# Patient Record
Sex: FEMALE | NOT HISPANIC OR LATINO | URBAN - METROPOLITAN AREA
[De-identification: names, ages, dates, MRNs, and addresses within clinical notes are randomized per-mention and may not be internally consistent; named-entity substitution may affect disease eponyms.]

---

## 2019-08-08 ENCOUNTER — APPOINTMENT (RX ONLY)
Dept: URBAN - METROPOLITAN AREA CLINIC 57 | Facility: CLINIC | Age: 54
Setting detail: DERMATOLOGY
End: 2019-08-08

## 2019-08-08 ENCOUNTER — RX ONLY (OUTPATIENT)
Age: 54
Setting detail: RX ONLY
End: 2019-08-08

## 2019-08-08 DIAGNOSIS — L82.1 OTHER SEBORRHEIC KERATOSIS: ICD-10-CM

## 2019-08-08 DIAGNOSIS — D22 MELANOCYTIC NEVI: ICD-10-CM

## 2019-08-08 DIAGNOSIS — L60.3 NAIL DYSTROPHY: ICD-10-CM

## 2019-08-08 DIAGNOSIS — L40.0 PSORIASIS VULGARIS: ICD-10-CM

## 2019-08-08 PROBLEM — D22.5 MELANOCYTIC NEVI OF TRUNK: Status: ACTIVE | Noted: 2019-08-08

## 2019-08-08 PROBLEM — L60.9 NAIL DISORDER, UNSPECIFIED: Status: ACTIVE | Noted: 2019-08-08

## 2019-08-08 PROBLEM — L30.9 DERMATITIS, UNSPECIFIED: Status: ACTIVE | Noted: 2019-08-08

## 2019-08-08 PROCEDURE — ? TREATMENT REGIMEN

## 2019-08-08 PROCEDURE — 99203 OFFICE O/P NEW LOW 30 MIN: CPT | Mod: 25

## 2019-08-08 PROCEDURE — ? OBSERVATION AND MEASURE

## 2019-08-08 PROCEDURE — ? PRESCRIPTION

## 2019-08-08 PROCEDURE — ? BIOPSY BY PUNCH METHOD

## 2019-08-08 PROCEDURE — 11104 PUNCH BX SKIN SINGLE LESION: CPT

## 2019-08-08 PROCEDURE — ? COUNSELING

## 2019-08-08 RX ORDER — KETOCONAZOLE 20.5 MG/ML
SHAMPOO, SUSPENSION TOPICAL
Qty: 1 | Refills: 3 | Status: ERX | COMMUNITY
Start: 2019-08-08

## 2019-08-08 RX ORDER — CLOBETASOL PROPIONATE 0.5 MG/G
AEROSOL, FOAM TOPICAL
Qty: 1 | Refills: 3 | Status: ERX | COMMUNITY
Start: 2019-08-08

## 2019-08-08 RX ORDER — KETOCONAZOLE 20.5 MG/ML
SHAMPOO, SUSPENSION TOPICAL
Qty: 1 | Refills: 3 | Status: ERX

## 2019-08-08 RX ORDER — CLOBETASOL PROPIONATE 0.5 MG/G
AEROSOL, FOAM TOPICAL
Qty: 1 | Refills: 3 | Status: ERX

## 2019-08-08 RX ADMIN — CLOBETASOL PROPIONATE: 0.5 AEROSOL, FOAM TOPICAL at 00:00

## 2019-08-08 RX ADMIN — KETOCONAZOLE: 20.5 SHAMPOO, SUSPENSION TOPICAL at 00:00

## 2019-08-08 ASSESSMENT — LOCATION DETAILED DESCRIPTION DERM
LOCATION DETAILED: MID-OCCIPITAL SCALP
LOCATION DETAILED: LEFT MID-UPPER BACK
LOCATION DETAILED: LEFT SUPERIOR MEDIAL UPPER BACK
LOCATION DETAILED: RIGHT INDEX FINGERNAIL
LOCATION DETAILED: LEFT SUPERIOR UPPER BACK
LOCATION DETAILED: LEFT INDEX FINGERNAIL

## 2019-08-08 ASSESSMENT — LOCATION SIMPLE DESCRIPTION DERM
LOCATION SIMPLE: LEFT UPPER BACK
LOCATION SIMPLE: LEFT INDEX FINGERNAIL
LOCATION SIMPLE: POSTERIOR SCALP
LOCATION SIMPLE: RIGHT INDEX FINGERNAIL

## 2019-08-08 ASSESSMENT — LOCATION ZONE DERM
LOCATION ZONE: SCALP
LOCATION ZONE: FINGERNAIL
LOCATION ZONE: TRUNK

## 2019-08-08 NOTE — PROCEDURE: BIOPSY BY PUNCH METHOD
Additional Anesthesia Volume In Cc (Will Not Render If 0): 0
Home Suture Removal Text: Patient was provided a home suture removal kit and will remove their sutures at home.  If they have any questions or difficulties they will call the office.
Number Of Epidermal Sutures (Optional): 2
Anesthesia Type: 1% lidocaine with epinephrine and a 1:10 solution of 8.4% sodium bicarbonate
Lab Facility: [id number]
Bill For Surgical Tray: no
Wound Care: Vaseline
Punch Size In Mm: 4
Detail Level: Detailed
Notification Instructions: Patient will be notified of biopsy results. However, patient instructed to call the office if not contacted within 2 weeks.
Consent: Verbal consent was obtained and risks were reviewed including but not limited to scarring, infection, bleeding, scabbing, incomplete removal, nerve damage and allergy to anesthesia.
Suture Removal: 14 days
Path Notes (To The Dermatopathologist): 4mm punch.
Epidermal Sutures: 4-0 Nylon
Anesthesia Volume In Cc (Will Not Render If 0): 3
Billing Type: Third-Party Bill
Post-Care Instructions: I reviewed with the patient in detail post-care instructions. Patient is to keep the biopsy site dry overnight, and then apply bacitracin twice daily until healed.
Dressing: no dressing applied
Lab: [id number]
Hemostasis: None
Biopsy Type: H and E
Was A Bandage Applied: Yes

## 2019-08-08 NOTE — PROCEDURE: MIPS QUALITY
Quality 110: Preventive Care And Screening: Influenza Immunization: Influenza Immunization not Administered due to Patient Allergy.
Quality 130: Documentation Of Current Medications In The Medical Record: Current Medications Documented
Detail Level: Detailed
Quality 226: Preventive Care And Screening: Tobacco Use: Screening And Cessation Intervention: Patient screened for tobacco use and is an ex/non-smoker
Quality 111:Pneumonia Vaccination Status For Older Adults: Pneumococcal Vaccination not Administered or Previously Received, Reason not Otherwise Specified

## 2019-08-08 NOTE — PROCEDURE: TREATMENT REGIMEN
Action 4: Continue
Start Regimen: Olux 0.05% foam bid prn flares, Ketoconazole 2% shampoo QD prn
Detail Level: Zone

## 2019-08-20 ENCOUNTER — APPOINTMENT (RX ONLY)
Dept: URBAN - METROPOLITAN AREA CLINIC 57 | Facility: CLINIC | Age: 54
Setting detail: DERMATOLOGY
End: 2019-08-20

## 2019-08-20 DIAGNOSIS — L259 CONTACT DERMATITIS AND OTHER ECZEMA, UNSPECIFIED CAUSE: ICD-10-CM

## 2019-08-20 DIAGNOSIS — Z48.02 ENCOUNTER FOR REMOVAL OF SUTURES: ICD-10-CM

## 2019-08-20 PROBLEM — L30.8 OTHER SPECIFIED DERMATITIS: Status: ACTIVE | Noted: 2019-08-20

## 2019-08-20 PROCEDURE — 99213 OFFICE O/P EST LOW 20 MIN: CPT

## 2019-08-20 PROCEDURE — ? SUTURE REMOVAL (GLOBAL PERIOD)

## 2019-08-20 PROCEDURE — ? TREATMENT REGIMEN

## 2019-08-20 PROCEDURE — ? COUNSELING

## 2019-08-20 ASSESSMENT — LOCATION SIMPLE DESCRIPTION DERM
LOCATION SIMPLE: RIGHT EAR
LOCATION SIMPLE: LEFT EAR
LOCATION SIMPLE: POSTERIOR SCALP

## 2019-08-20 ASSESSMENT — LOCATION ZONE DERM
LOCATION ZONE: EAR
LOCATION ZONE: SCALP

## 2019-08-20 ASSESSMENT — LOCATION DETAILED DESCRIPTION DERM
LOCATION DETAILED: LEFT ANTIHELIX
LOCATION DETAILED: RIGHT CYMBA CONCHA
LOCATION DETAILED: MID-OCCIPITAL SCALP

## 2019-08-20 NOTE — PROCEDURE: MIPS QUALITY
Detail Level: Detailed
Quality 110: Preventive Care And Screening: Influenza Immunization: Influenza Immunization not Administered due to Patient Allergy.
Quality 130: Documentation Of Current Medications In The Medical Record: Current Medications Documented
Quality 226: Preventive Care And Screening: Tobacco Use: Screening And Cessation Intervention: Patient screened for tobacco use and is an ex/non-smoker
Quality 111:Pneumonia Vaccination Status For Older Adults: Pneumococcal Vaccination not Administered or Previously Received, Reason not Otherwise Specified

## 2019-08-20 NOTE — PROCEDURE: SUTURE REMOVAL (GLOBAL PERIOD)
Detail Level: Detailed
Add 59290 Cpt? (Important Note: In 2017 The Use Of 19910 Is Being Tracked By Cms To Determine Future Global Period Reimbursement For Global Periods): no

## 2019-08-20 NOTE — PROCEDURE: TREATMENT REGIMEN
Continue Regimen: Olux 0.05% foam bid prn flares, Ketoconazole 2% shampoo QD prn
Detail Level: Zone
Plan: Discussed biopsy results consistent with eczema but would consider rebiopsy in future if rash returns
Otc Regimen: Sarnol lotion QD to ears

## 2019-11-27 ENCOUNTER — APPOINTMENT (RX ONLY)
Dept: URBAN - METROPOLITAN AREA CLINIC 57 | Facility: CLINIC | Age: 54
Setting detail: DERMATOLOGY
End: 2019-11-27

## 2019-11-27 DIAGNOSIS — L20.89 OTHER ATOPIC DERMATITIS: ICD-10-CM | Status: INADEQUATELY CONTROLLED

## 2019-11-27 DIAGNOSIS — L259 CONTACT DERMATITIS AND OTHER ECZEMA, UNSPECIFIED CAUSE: ICD-10-CM | Status: IMPROVED

## 2019-11-27 PROBLEM — L30.8 OTHER SPECIFIED DERMATITIS: Status: ACTIVE | Noted: 2019-11-27

## 2019-11-27 PROBLEM — L30.9 DERMATITIS, UNSPECIFIED: Status: ACTIVE | Noted: 2019-11-27

## 2019-11-27 PROCEDURE — ? COUNSELING

## 2019-11-27 PROCEDURE — 99214 OFFICE O/P EST MOD 30 MIN: CPT

## 2019-11-27 PROCEDURE — ? TREATMENT REGIMEN

## 2019-11-27 PROCEDURE — ? PRESCRIPTION

## 2019-11-27 RX ORDER — HYDROCORTISONE BUTYRATE 1 MG/G
CREAM TOPICAL
Qty: 1 | Refills: 3 | Status: ERX | COMMUNITY
Start: 2019-11-27

## 2019-11-27 RX ORDER — KETOCONAZOLE 20 MG/G
CREAM TOPICAL
Qty: 1 | Refills: 3 | Status: ERX | COMMUNITY
Start: 2019-11-27

## 2019-11-27 RX ADMIN — KETOCONAZOLE: 20 CREAM TOPICAL at 00:00

## 2019-11-27 RX ADMIN — HYDROCORTISONE BUTYRATE: 1 CREAM TOPICAL at 00:00

## 2019-11-27 ASSESSMENT — LOCATION ZONE DERM
LOCATION ZONE: EAR
LOCATION ZONE: AXILLAE
LOCATION ZONE: SCALP

## 2019-11-27 ASSESSMENT — LOCATION DETAILED DESCRIPTION DERM
LOCATION DETAILED: RIGHT CYMBA CONCHA
LOCATION DETAILED: LEFT ANTIHELIX
LOCATION DETAILED: MID-OCCIPITAL SCALP
LOCATION DETAILED: LEFT AXILLARY VAULT

## 2019-11-27 ASSESSMENT — LOCATION SIMPLE DESCRIPTION DERM
LOCATION SIMPLE: RIGHT EAR
LOCATION SIMPLE: LEFT EAR
LOCATION SIMPLE: POSTERIOR SCALP
LOCATION SIMPLE: LEFT AXILLARY VAULT

## 2019-11-27 NOTE — PROCEDURE: MIPS QUALITY
Detail Level: Detailed
Quality 130: Documentation Of Current Medications In The Medical Record: Current Medications Documented
Quality 226: Preventive Care And Screening: Tobacco Use: Screening And Cessation Intervention: Patient screened for tobacco use and is an ex/non-smoker
Quality 110: Preventive Care And Screening: Influenza Immunization: Influenza Immunization not Administered due to Patient Allergy.
Quality 111:Pneumonia Vaccination Status For Older Adults: Pneumococcal Vaccination not Administered or Previously Received, Reason not Otherwise Specified

## 2019-11-27 NOTE — HPI: RASH
What Type Of Note Output Would You Prefer (Optional)?: Standard Output
How Severe Is Your Rash?: moderate
Is This A New Presentation, Or A Follow-Up?: Rash
Additional History: Patient tried OTC Sarna lotion and reports minimal improvement in rash. Patient states deodorant burns upon application.

## 2019-11-27 NOTE — PROCEDURE: TREATMENT REGIMEN
Initiate Treatment: Ketoconazole 2% cream BID to ears
Otc Regimen: Sarnol lotion QD to ears (continue)
Detail Level: Zone
Continue Regimen: Olux 0.05% foam bid prn flares, Ketoconazole 2% shampoo QD prn
Plan: Discussed ILK for treatment to consider in the future if not improving with topicals
Initiate Treatment: HC butyrate 0.1% cream BID PRN flares, ketoconazole 2% cream BID

## 2020-01-03 ENCOUNTER — APPOINTMENT (RX ONLY)
Dept: URBAN - METROPOLITAN AREA CLINIC 57 | Facility: CLINIC | Age: 55
Setting detail: DERMATOLOGY
End: 2020-01-03

## 2020-01-03 DIAGNOSIS — L259 CONTACT DERMATITIS AND OTHER ECZEMA, UNSPECIFIED CAUSE: ICD-10-CM | Status: IMPROVED

## 2020-01-03 DIAGNOSIS — L20.89 OTHER ATOPIC DERMATITIS: ICD-10-CM | Status: IMPROVED

## 2020-01-03 PROBLEM — L30.8 OTHER SPECIFIED DERMATITIS: Status: ACTIVE | Noted: 2020-01-03

## 2020-01-03 PROBLEM — L30.9 DERMATITIS, UNSPECIFIED: Status: ACTIVE | Noted: 2020-01-03

## 2020-01-03 PROCEDURE — ? PRESCRIPTION

## 2020-01-03 PROCEDURE — ? TREATMENT REGIMEN

## 2020-01-03 PROCEDURE — 99213 OFFICE O/P EST LOW 20 MIN: CPT

## 2020-01-03 PROCEDURE — ? COUNSELING

## 2020-01-03 RX ORDER — HYDROCORTISONE ACETATE AND PRAMOXINE HYDROCHLORIDE 25; 10 MG/ML; MG/ML
LOTION TOPICAL
Qty: 1 | Refills: 3 | Status: ERX | COMMUNITY
Start: 2020-01-03

## 2020-01-03 RX ADMIN — HYDROCORTISONE ACETATE AND PRAMOXINE HYDROCHLORIDE: 25; 10 LOTION TOPICAL at 00:00

## 2020-01-03 ASSESSMENT — LOCATION SIMPLE DESCRIPTION DERM
LOCATION SIMPLE: POSTERIOR SCALP
LOCATION SIMPLE: RIGHT EAR
LOCATION SIMPLE: LEFT EAR
LOCATION SIMPLE: LEFT AXILLARY VAULT

## 2020-01-03 ASSESSMENT — LOCATION DETAILED DESCRIPTION DERM
LOCATION DETAILED: LEFT ANTIHELIX
LOCATION DETAILED: RIGHT CYMBA CONCHA
LOCATION DETAILED: MID-OCCIPITAL SCALP
LOCATION DETAILED: LEFT AXILLARY VAULT

## 2020-01-03 ASSESSMENT — LOCATION ZONE DERM
LOCATION ZONE: SCALP
LOCATION ZONE: EAR
LOCATION ZONE: AXILLAE

## 2020-01-03 NOTE — PROCEDURE: TREATMENT REGIMEN
Continue Regimen: ketoconazole 2% cream BID
Samples Given: Pramosone lotion
Otc Regimen: Claritin QHS, continue Allegra QAM, Sarnol lotion QD, lidocaine 4% cream QD PRN
Detail Level: Zone
Initiate Treatment: Pramosone lotion BID PRN flares
Modify Regimen: HC butyrate 0.1% cream BID weekends only
Otc Regimen: Sarnol lotion QD to ears (continue)
Continue Regimen: Olux 0.05% foam bid prn flares, Ketoconazole 2% shampoo QD prn to scalp, Ketoconazole 2% cream BID to ears

## 2020-01-03 NOTE — PROCEDURE: MIPS QUALITY
Detail Level: Detailed
Quality 130: Documentation Of Current Medications In The Medical Record: Current Medications Documented
Quality 111:Pneumonia Vaccination Status For Older Adults: Pneumococcal Vaccination not Administered or Previously Received, Reason not Otherwise Specified
Quality 110: Preventive Care And Screening: Influenza Immunization: Influenza Immunization not Administered due to Patient Allergy.
Quality 226: Preventive Care And Screening: Tobacco Use: Screening And Cessation Intervention: Patient screened for tobacco use and is an ex/non-smoker

## 2020-02-05 ENCOUNTER — APPOINTMENT (RX ONLY)
Dept: URBAN - METROPOLITAN AREA CLINIC 57 | Facility: CLINIC | Age: 55
Setting detail: DERMATOLOGY
End: 2020-02-05

## 2020-02-05 DIAGNOSIS — L259 CONTACT DERMATITIS AND OTHER ECZEMA, UNSPECIFIED CAUSE: ICD-10-CM | Status: IMPROVED

## 2020-02-05 DIAGNOSIS — L20.89 OTHER ATOPIC DERMATITIS: ICD-10-CM | Status: IMPROVED

## 2020-02-05 PROBLEM — L30.8 OTHER SPECIFIED DERMATITIS: Status: ACTIVE | Noted: 2020-02-05

## 2020-02-05 PROBLEM — L30.9 DERMATITIS, UNSPECIFIED: Status: ACTIVE | Noted: 2020-02-05

## 2020-02-05 PROCEDURE — ? PRESCRIPTION

## 2020-02-05 PROCEDURE — ? COUNSELING

## 2020-02-05 PROCEDURE — 99214 OFFICE O/P EST MOD 30 MIN: CPT

## 2020-02-05 PROCEDURE — ? TREATMENT REGIMEN

## 2020-02-05 RX ORDER — PIMECROLIMUS 10 MG/G
CREAM TOPICAL
Qty: 1 | Refills: 3 | Status: ERX | COMMUNITY
Start: 2020-02-05

## 2020-02-05 RX ADMIN — PIMECROLIMUS: 10 CREAM TOPICAL at 00:00

## 2020-02-05 ASSESSMENT — LOCATION DETAILED DESCRIPTION DERM
LOCATION DETAILED: RIGHT CYMBA CONCHA
LOCATION DETAILED: MID-OCCIPITAL SCALP
LOCATION DETAILED: LEFT AXILLARY VAULT
LOCATION DETAILED: LEFT ANTIHELIX

## 2020-02-05 ASSESSMENT — LOCATION SIMPLE DESCRIPTION DERM
LOCATION SIMPLE: RIGHT EAR
LOCATION SIMPLE: LEFT AXILLARY VAULT
LOCATION SIMPLE: LEFT EAR
LOCATION SIMPLE: POSTERIOR SCALP

## 2020-02-05 ASSESSMENT — LOCATION ZONE DERM
LOCATION ZONE: AXILLAE
LOCATION ZONE: SCALP
LOCATION ZONE: EAR

## 2020-02-05 NOTE — PROCEDURE: TREATMENT REGIMEN
Continue Regimen: Ketoconazole 2% cream BID prn\\nPramosone lotion BID PRN flares
Modify Regimen: HC butyrate 0.1% cream BID flares only
Detail Level: Zone
Otc Regimen: Claritin QHS, continue Allegra QAM, Sarnol lotion QD, lidocaine 4% cream QD PRN
Continue Regimen: Olux 0.05% foam bid prn flares, Ketoconazole 2% shampoo QD prn to scalp, Ketoconazole 2% cream BID to ears
Initiate Treatment: Elidel cream BID
Plan: Discussed Elidel cream vs Protopic ointment. Pt would like to proceed with Elidel qd. If burns on application, place in refrigerator and apply cold.
Otc Regimen: Sarna lotion QD to ears (continue)

## 2020-02-05 NOTE — PROCEDURE: MIPS QUALITY
Detail Level: Detailed
Quality 130: Documentation Of Current Medications In The Medical Record: Current Medications Documented
Quality 111:Pneumonia Vaccination Status For Older Adults: Pneumococcal Vaccination not Administered or Previously Received, Reason not Otherwise Specified
Quality 226: Preventive Care And Screening: Tobacco Use: Screening And Cessation Intervention: Patient screened for tobacco use and is an ex/non-smoker
Quality 110: Preventive Care And Screening: Influenza Immunization: Influenza Immunization not Administered due to Patient Allergy.

## 2020-03-25 ENCOUNTER — APPOINTMENT (RX ONLY)
Dept: URBAN - METROPOLITAN AREA CLINIC 57 | Facility: CLINIC | Age: 55
Setting detail: DERMATOLOGY
End: 2020-03-25

## 2020-03-25 DIAGNOSIS — L259 CONTACT DERMATITIS AND OTHER ECZEMA, UNSPECIFIED CAUSE: ICD-10-CM | Status: INADEQUATELY CONTROLLED

## 2020-03-25 DIAGNOSIS — L20.89 OTHER ATOPIC DERMATITIS: ICD-10-CM | Status: INADEQUATELY CONTROLLED

## 2020-03-25 PROBLEM — L30.8 OTHER SPECIFIED DERMATITIS: Status: ACTIVE | Noted: 2020-03-25

## 2020-03-25 PROBLEM — L30.9 DERMATITIS, UNSPECIFIED: Status: ACTIVE | Noted: 2020-03-25

## 2020-03-25 PROCEDURE — 99214 OFFICE O/P EST MOD 30 MIN: CPT

## 2020-03-25 PROCEDURE — ? TREATMENT REGIMEN

## 2020-03-25 PROCEDURE — ? COUNSELING

## 2020-03-25 ASSESSMENT — LOCATION ZONE DERM
LOCATION ZONE: EAR
LOCATION ZONE: SCALP
LOCATION ZONE: AXILLAE

## 2020-03-25 ASSESSMENT — LOCATION SIMPLE DESCRIPTION DERM
LOCATION SIMPLE: LEFT EAR
LOCATION SIMPLE: RIGHT EAR
LOCATION SIMPLE: LEFT AXILLARY VAULT
LOCATION SIMPLE: POSTERIOR SCALP

## 2020-03-25 ASSESSMENT — LOCATION DETAILED DESCRIPTION DERM
LOCATION DETAILED: LEFT AXILLARY VAULT
LOCATION DETAILED: RIGHT CYMBA CONCHA
LOCATION DETAILED: LEFT ANTIHELIX
LOCATION DETAILED: MID-OCCIPITAL SCALP

## 2020-03-25 ASSESSMENT — SEVERITY ASSESSMENT 2020: SEVERITY 2020: MODERATE

## 2020-03-25 NOTE — PROCEDURE: MIPS QUALITY
Quality 226: Preventive Care And Screening: Tobacco Use: Screening And Cessation Intervention: Patient screened for tobacco use and is an ex/non-smoker
Detail Level: Detailed
Quality 130: Documentation Of Current Medications In The Medical Record: Current Medications Documented
Quality 110: Preventive Care And Screening: Influenza Immunization: Influenza Immunization not Administered due to Patient Allergy.
Quality 111:Pneumonia Vaccination Status For Older Adults: Pneumococcal Vaccination not Administered or Previously Received, Reason not Otherwise Specified

## 2020-03-25 NOTE — PROCEDURE: TREATMENT REGIMEN
Detail Level: Zone
Otc Regimen: Claritin QHS\\nAllegra QAM\\nSarnol lotion QD
Plan: Briefly discussed biologics to consider for the future once Covid19 has improved
Initiate Treatment: Elidel BID axillae\\nDomeboros solution PRN flares when ears are weeping
Continue Regimen: Ketoconazole 2% cream BID + HC butyrate cream until improved, then for flares only\\nPramosone lotion BID PRN flares
Initiate Treatment: Olux foam BID AAA scalp, ears x 1-2 weeks until better, then BID on weekends for maintenance
Otc Regimen: Sarna lotion QD to ears (continue)
Continue Regimen: Olux 0.05% foam BID PRN flares\\nKetoconazole 2% shampoo QD \\nKetoconazole 2% cream BID

## 2020-05-27 ENCOUNTER — APPOINTMENT (RX ONLY)
Dept: URBAN - METROPOLITAN AREA CLINIC 57 | Facility: CLINIC | Age: 55
Setting detail: DERMATOLOGY
End: 2020-05-27

## 2020-05-27 DIAGNOSIS — L40.0 PSORIASIS VULGARIS: ICD-10-CM | Status: INADEQUATELY CONTROLLED

## 2020-05-27 DIAGNOSIS — L20.89 OTHER ATOPIC DERMATITIS: ICD-10-CM | Status: IMPROVED

## 2020-05-27 PROBLEM — L30.9 DERMATITIS, UNSPECIFIED: Status: ACTIVE | Noted: 2020-05-27

## 2020-05-27 PROCEDURE — ? TREATMENT REGIMEN

## 2020-05-27 PROCEDURE — 11104 PUNCH BX SKIN SINGLE LESION: CPT

## 2020-05-27 PROCEDURE — ? SEPARATE AND IDENTIFIABLE DOCUMENTATION

## 2020-05-27 PROCEDURE — ? COUNSELING

## 2020-05-27 PROCEDURE — ? BIOPSY BY PUNCH METHOD

## 2020-05-27 PROCEDURE — 99213 OFFICE O/P EST LOW 20 MIN: CPT | Mod: 25

## 2020-05-27 ASSESSMENT — LOCATION DETAILED DESCRIPTION DERM
LOCATION DETAILED: LEFT CYMBA CONCHA
LOCATION DETAILED: LEFT MEDIAL FRONTAL SCALP
LOCATION DETAILED: LEFT AXILLARY VAULT
LOCATION DETAILED: MID POSTERIOR NECK
LOCATION DETAILED: RIGHT CYMBA CONCHA

## 2020-05-27 ASSESSMENT — LOCATION SIMPLE DESCRIPTION DERM
LOCATION SIMPLE: RIGHT EAR
LOCATION SIMPLE: POSTERIOR NECK
LOCATION SIMPLE: LEFT AXILLARY VAULT
LOCATION SIMPLE: LEFT SCALP
LOCATION SIMPLE: LEFT EAR

## 2020-05-27 ASSESSMENT — LOCATION ZONE DERM
LOCATION ZONE: SCALP
LOCATION ZONE: NECK
LOCATION ZONE: EAR
LOCATION ZONE: AXILLAE

## 2020-05-27 NOTE — PROCEDURE: TREATMENT REGIMEN
Detail Level: Zone
Continue Regimen: Elidel 1% cream bid, Ketoconazole 2% cream bid, Hydrocortisone butyrate 0.1% cream bid prn flares
Otc Regimen: Allegra QAM, Claritin QHS
Plan: Discussed second biopsy by punch to evaluate for psoriasis; patient agreeable. May consider biologics at NOV if pathology reveals psoriasis
Continue Regimen: Olux 0.05% foam BID PRN flares to scalp/ears, Ketoconazole 2% shampoo BIW-TIW, Ketoconazole 2% cream BID to ears

## 2020-05-27 NOTE — PROCEDURE: BIOPSY BY PUNCH METHOD
Detail Level: Detailed
Was A Bandage Applied: Yes
Punch Size In Mm: 4
Biopsy Type: H and E
Anesthesia Type: 1% lidocaine with epinephrine and a 1:10 solution of 8.4% sodium bicarbonate
Anesthesia Volume In Cc (Will Not Render If 0): 1
Additional Anesthesia Volume In Cc (Will Not Render If 0): 0
Hemostasis: None
Epidermal Sutures: 4-0 Nylon
Number Of Epidermal Sutures (Optional): 2
Wound Care: Vaseline
Dressing: no dressing applied
Suture Removal: 14 days
Patient Will Remove Sutures At Home?: No
Lab: [id number]
Lab Facility: [id number]
Path Notes (To The Dermatopathologist): 4mm punch
Consent: Verbal consent was obtained and risks were reviewed including but not limited to scarring, infection, bleeding, scabbing, incomplete removal, nerve damage and allergy to anesthesia.
Post-Care Instructions: I reviewed with the patient in detail post-care instructions. Patient is to keep the biopsy site dry overnight, and then apply bacitracin twice daily until healed.
Home Suture Removal Text: Patient was provided a home suture removal kit and will remove their sutures at home.  If they have any questions or difficulties they will call the office.
Notification Instructions: Patient will be notified of biopsy results. However, patient instructed to call the office if not contacted within 2 weeks.
Billing Type: Third-Party Bill
Information: Selecting Yes will display possible errors in your note based on the variables you have selected. This validation is only offered as a suggestion for you. PLEASE NOTE THAT THE VALIDATION TEXT WILL BE REMOVED WHEN YOU FINALIZE YOUR NOTE. IF YOU WANT TO FAX A PRELIMINARY NOTE YOU WILL NEED TO TOGGLE THIS TO 'NO' IF YOU DO NOT WANT IT IN YOUR FAXED NOTE.

## 2020-06-08 ENCOUNTER — APPOINTMENT (RX ONLY)
Dept: URBAN - METROPOLITAN AREA CLINIC 57 | Facility: CLINIC | Age: 55
Setting detail: DERMATOLOGY
End: 2020-06-08

## 2020-06-08 DIAGNOSIS — L259 CONTACT DERMATITIS AND OTHER ECZEMA, UNSPECIFIED CAUSE: ICD-10-CM | Status: STABLE

## 2020-06-08 DIAGNOSIS — L738 OTHER SPECIFIED DISEASES OF HAIR AND HAIR FOLLICLES: ICD-10-CM | Status: IMPROVED

## 2020-06-08 DIAGNOSIS — L73.9 FOLLICULAR DISORDER, UNSPECIFIED: ICD-10-CM | Status: IMPROVED

## 2020-06-08 DIAGNOSIS — L663 OTHER SPECIFIED DISEASES OF HAIR AND HAIR FOLLICLES: ICD-10-CM | Status: IMPROVED

## 2020-06-08 DIAGNOSIS — Z71.89 OTHER SPECIFIED COUNSELING: ICD-10-CM

## 2020-06-08 DIAGNOSIS — L20.89 OTHER ATOPIC DERMATITIS: ICD-10-CM | Status: STABLE

## 2020-06-08 DIAGNOSIS — Z48.02 ENCOUNTER FOR REMOVAL OF SUTURES: ICD-10-CM

## 2020-06-08 PROBLEM — L30.8 OTHER SPECIFIED DERMATITIS: Status: ACTIVE | Noted: 2020-06-08

## 2020-06-08 PROBLEM — L30.9 DERMATITIS, UNSPECIFIED: Status: ACTIVE | Noted: 2020-06-08

## 2020-06-08 PROBLEM — L02.821 FURUNCLE OF HEAD [ANY PART, EXCEPT FACE]: Status: ACTIVE | Noted: 2020-06-08

## 2020-06-08 PROCEDURE — ? COUNSELING

## 2020-06-08 PROCEDURE — ? TREATMENT REGIMEN

## 2020-06-08 PROCEDURE — ? PATHOLOGY DISCUSSION

## 2020-06-08 PROCEDURE — ? SUTURE REMOVAL (GLOBAL PERIOD)

## 2020-06-08 PROCEDURE — 99213 OFFICE O/P EST LOW 20 MIN: CPT

## 2020-06-08 ASSESSMENT — LOCATION ZONE DERM
LOCATION ZONE: TRUNK
LOCATION ZONE: NECK
LOCATION ZONE: EAR
LOCATION ZONE: SCALP
LOCATION ZONE: AXILLAE

## 2020-06-08 ASSESSMENT — LOCATION DETAILED DESCRIPTION DERM
LOCATION DETAILED: INFERIOR THORACIC SPINE
LOCATION DETAILED: MID-OCCIPITAL SCALP
LOCATION DETAILED: RIGHT CYMBA CONCHA
LOCATION DETAILED: MID POSTERIOR NECK
LOCATION DETAILED: LEFT AXILLARY VAULT
LOCATION DETAILED: LEFT ANTIHELIX

## 2020-06-08 ASSESSMENT — LOCATION SIMPLE DESCRIPTION DERM
LOCATION SIMPLE: RIGHT EAR
LOCATION SIMPLE: UPPER BACK
LOCATION SIMPLE: LEFT AXILLARY VAULT
LOCATION SIMPLE: LEFT EAR
LOCATION SIMPLE: POSTERIOR NECK
LOCATION SIMPLE: POSTERIOR SCALP

## 2020-06-08 NOTE — PROCEDURE: SUTURE REMOVAL (GLOBAL PERIOD)
Detail Level: Detailed
Add 91282 Cpt? (Important Note: In 2017 The Use Of 84514 Is Being Tracked By Cms To Determine Future Global Period Reimbursement For Global Periods): no

## 2020-06-08 NOTE — PROCEDURE: TREATMENT REGIMEN
Continue Regimen: Elidel 1% cream bid, Ketoconazole 2% cream bid, Hydrocortisone butyrate 0.1% cream bid prn flares
Detail Level: Zone
Otc Regimen: Allegra QAM (continue), Claritin QHS (continue) OR Xyzal 5mg QHS
Continue Regimen: Olux 0.05% foam BID PRN flares to scalp/ears, Ketoconazole 2% shampoo BIW-TIW, Ketoconazole 2% cream BID to ears
Plan: Even though biopsy shows folliculitis, it does not match clinically with rash on scalp; may have been sampling error. Discussed rebiopsy in the future if rash recurs; pt is agreeable
Otc Regimen: CeraVe or Cetaphil body wash QD, Eucerin Smooth Repair cream QD after shower

## 2020-08-04 ENCOUNTER — APPOINTMENT (RX ONLY)
Dept: URBAN - METROPOLITAN AREA CLINIC 125 | Facility: CLINIC | Age: 55
Setting detail: DERMATOLOGY
End: 2020-08-04

## 2020-08-04 DIAGNOSIS — L663 OTHER SPECIFIED DISEASES OF HAIR AND HAIR FOLLICLES: ICD-10-CM

## 2020-08-04 DIAGNOSIS — L20.89 OTHER ATOPIC DERMATITIS: ICD-10-CM | Status: INADEQUATELY CONTROLLED

## 2020-08-04 DIAGNOSIS — Z79.899 OTHER LONG TERM (CURRENT) DRUG THERAPY: ICD-10-CM

## 2020-08-04 DIAGNOSIS — B00.1 HERPESVIRAL VESICULAR DERMATITIS: ICD-10-CM

## 2020-08-04 DIAGNOSIS — L259 CONTACT DERMATITIS AND OTHER ECZEMA, UNSPECIFIED CAUSE: ICD-10-CM | Status: INADEQUATELY CONTROLLED

## 2020-08-04 DIAGNOSIS — L738 OTHER SPECIFIED DISEASES OF HAIR AND HAIR FOLLICLES: ICD-10-CM

## 2020-08-04 DIAGNOSIS — L73.9 FOLLICULAR DISORDER, UNSPECIFIED: ICD-10-CM

## 2020-08-04 PROBLEM — L30.9 DERMATITIS, UNSPECIFIED: Status: ACTIVE | Noted: 2020-08-04

## 2020-08-04 PROBLEM — L02.821 FURUNCLE OF HEAD [ANY PART, EXCEPT FACE]: Status: ACTIVE | Noted: 2020-08-04

## 2020-08-04 PROCEDURE — ? TREATMENT REGIMEN

## 2020-08-04 PROCEDURE — ? ORDER TESTS

## 2020-08-04 PROCEDURE — ? COUNSELING

## 2020-08-04 PROCEDURE — 99214 OFFICE O/P EST MOD 30 MIN: CPT

## 2020-08-04 PROCEDURE — ? HIGH RISK MEDICATION MONITORING

## 2020-08-04 PROCEDURE — ? PRESCRIPTION

## 2020-08-04 PROCEDURE — ? OTHER

## 2020-08-04 RX ORDER — VALACYCLOVIR HYDROCHLORIDE 1 G/1
TABLET, FILM COATED ORAL
Qty: 20 | Refills: 2 | Status: ERX | COMMUNITY
Start: 2020-08-04

## 2020-08-04 RX ADMIN — VALACYCLOVIR HYDROCHLORIDE: 1 TABLET, FILM COATED ORAL at 00:00

## 2020-08-04 ASSESSMENT — LOCATION SIMPLE DESCRIPTION DERM
LOCATION SIMPLE: LEFT SCALP
LOCATION SIMPLE: RIGHT EAR
LOCATION SIMPLE: LEFT LIP
LOCATION SIMPLE: LEFT AXILLARY VAULT
LOCATION SIMPLE: POSTERIOR SCALP
LOCATION SIMPLE: LEFT EAR

## 2020-08-04 ASSESSMENT — LOCATION DETAILED DESCRIPTION DERM
LOCATION DETAILED: LEFT INFERIOR VERMILION LIP
LOCATION DETAILED: RIGHT CYMBA CONCHA
LOCATION DETAILED: LEFT MEDIAL FRONTAL SCALP
LOCATION DETAILED: LEFT INFERIOR CRUS OF ANTIHELIX
LOCATION DETAILED: LEFT AXILLARY VAULT
LOCATION DETAILED: MID-OCCIPITAL SCALP
LOCATION DETAILED: LEFT ANTIHELIX

## 2020-08-04 ASSESSMENT — LOCATION ZONE DERM
LOCATION ZONE: SCALP
LOCATION ZONE: LIP
LOCATION ZONE: AXILLAE
LOCATION ZONE: EAR

## 2020-08-04 ASSESSMENT — BSA ECZEMA: % BODY COVERED IN ECZEMA: 3

## 2020-08-04 ASSESSMENT — SEVERITY ASSESSMENT 2020: SEVERITY 2020: SEVERE

## 2020-08-04 NOTE — PROCEDURE: OTHER
Other (Free Text): Lab slip given today.
Detail Level: Simple
Note Text (......Xxx Chief Complaint.): This diagnosis correlates with the

## 2020-08-04 NOTE — PROCEDURE: ORDER TESTS
Bill For Surgical Tray: no
Billing Type: Third-Party Bill
Expected Date Of Service: 08/04/2020
Performing Laboratory: [id number]

## 2020-08-04 NOTE — PROCEDURE: HIGH RISK MEDICATION MONITORING
Itraconazole Counseling:  I discussed with the patient the risks of itraconazole including but not limited to liver damage, nausea/vomiting, neuropathy, and severe allergy.  The patient understands that this medication is best absorbed when taken with acidic beverages such as non-diet cola or ginger ale.  The patient understands that monitoring is required including baseline LFTs and repeat LFTs at intervals.  The patient understands that they are to contact us or the primary physician if concerning signs are noted.
Azithromycin Counseling:  I discussed with the patient the risks of azithromycin including but not limited to GI upset, allergic reaction, drug rash, diarrhea, and yeast infections.
Ivermectin Pregnancy And Lactation Text: This medication is Pregnancy Category C and it isn't known if it is safe during pregnancy. It is also excreted in breast milk.
Infliximab Pregnancy And Lactation Text: This medication is Pregnancy Category B and is considered safe during pregnancy. It is unknown if this medication is excreted in breast milk.
Xolair Counseling:  Patient informed of potential adverse effects including but not limited to fever, muscle aches, rash and allergic reactions.  The patient verbalized understanding of the proper use and possible adverse effects of Xolair.  All of the patient's questions and concerns were addressed.
Bexarotene Counseling:  I discussed with the patient the risks of bexarotene including but not limited to hair loss, dry lips/skin/eyes, liver abnormalities, hyperlipidemia, pancreatitis, depression/suicidal ideation, photosensitivity, drug rash/allergic reactions, hypothyroidism, anemia, leukopenia, infection, cataracts, and teratogenicity.  Patient understands that they will need regular blood tests to check lipid profile, liver function tests, white blood cell count, thyroid function tests and pregnancy test if applicable.
Hydroxychloroquine Pregnancy And Lactation Text: This medication has been shown to cause fetal harm but it isn't assigned a Pregnancy Risk Category. There are small amounts excreted in breast milk.
Xolair Pregnancy And Lactation Text: This medication is Pregnancy Category B and is considered safe during pregnancy. This medication is excreted in breast milk.
Nsaids Counseling: NSAID Counseling: I discussed with the patient that NSAIDs should be taken with food. Prolonged use of NSAIDs can result in the development of stomach ulcers.  Patient advised to stop taking NSAIDs if abdominal pain occurs.  The patient verbalized understanding of the proper use and possible adverse effects of NSAIDs.  All of the patient's questions and concerns were addressed.
Valtrex Pregnancy And Lactation Text: this medication is Pregnancy Category B and is considered safe during pregnancy. This medication is not directly found in breast milk but it's metabolite acyclovir is present.
Eucrisa Pregnancy And Lactation Text: This medication has not been assigned a Pregnancy Risk Category but animal studies failed to show danger with the topical medication. It is unknown if the medication is excreted in breast milk.
Quinolones Pregnancy And Lactation Text: This medication is Pregnancy Category C and it isn't know if it is safe during pregnancy. It is also excreted in breast milk.
Rituxan Counseling:  I discussed with the patient the risks of Rituxan infusions. Side effects can include infusion reactions, severe drug rashes including mucocutaneous reactions, reactivation of latent hepatitis and other infections and rarely progressive multifocal leukoencephalopathy.  All of the patient's questions and concerns were addressed.
Topical Clindamycin Pregnancy And Lactation Text: This medication is Pregnancy Category B and is considered safe during pregnancy. It is unknown if it is excreted in breast milk.
Bexarotene Pregnancy And Lactation Text: This medication is Pregnancy Category X and should not be given to women who are pregnant or may become pregnant. This medication should not be used if you are breast feeding.
Nsaids Pregnancy And Lactation Text: These medications are considered safe up to 30 weeks gestation. It is excreted in breast milk.
Topical Sulfur Applications Counseling: Topical Sulfur Counseling: Patient counseled that this medication may cause skin irritation or allergic reactions.  In the event of skin irritation, the patient was advised to reduce the amount of the drug applied or use it less frequently.   The patient verbalized understanding of the proper use and possible adverse effects of topical sulfur application.  All of the patient's questions and concerns were addressed.
Rifampin Counseling: I discussed with the patient the risks of rifampin including but not limited to liver damage, kidney damage, red-orange body fluids, nausea/vomiting and severe allergy.
Azithromycin Pregnancy And Lactation Text: This medication is considered safe during pregnancy and is also secreted in breast milk.
Hydroquinone Counseling:  Patient advised that medication may result in skin irritation, lightening (hypopigmentation), dryness, and burning.  In the event of skin irritation, the patient was advised to reduce the amount of the drug applied or use it less frequently.  Rarely, spots that are treated with hydroquinone can become darker (pseudoochronosis).  Should this occur, patient instructed to stop medication and call the office. The patient verbalized understanding of the proper use and possible adverse effects of hydroquinone.  All of the patient's questions and concerns were addressed.
Bactrim Counseling:  I discussed with the patient the risks of sulfa antibiotics including but not limited to GI upset, allergic reaction, drug rash, diarrhea, dizziness, photosensitivity, and yeast infections.  Rarely, more serious reactions can occur including but not limited to aplastic anemia, agranulocytosis, methemoglobinemia, blood dyscrasias, liver or kidney failure, lung infiltrates or desquamative/blistering drug rashes.
Ketoconazole Counseling:   Patient counseled regarding improving absorption with orange juice.  Adverse effects include but are not limited to breast enlargement, headache, diarrhea, nausea, upset stomach, liver function test abnormalities, taste disturbance, and stomach pain.  There is a rare possibility of liver failure that can occur when taking ketoconazole. The patient understands that monitoring of LFTs may be required, especially at baseline. The patient verbalized understanding of the proper use and possible adverse effects of ketoconazole.  All of the patient's questions and concerns were addressed.
Isotretinoin Counseling: Patient should get monthly blood tests, not donate blood, not drive at night if vision affected, not share medication, and not undergo elective surgery for 6 months after tx completed. Side effects reviewed, pt to contact office should one occur.
Use Enhanced Medication Counseling?: No
Odomzo Counseling- I discussed with the patient the risks of Odomzo including but not limited to nausea, vomiting, diarrhea, constipation, weight loss, changes in the sense of taste, decreased appetite, muscle spasms, and hair loss.  The patient verbalized understanding of the proper use and possible adverse effects of Odomzo.  All of the patient's questions and concerns were addressed.
Topical Sulfur Applications Pregnancy And Lactation Text: This medication is Pregnancy Category C and has an unknown safety profile during pregnancy. It is unknown if this topical medication is excreted in breast milk.
Rituxan Pregnancy And Lactation Text: This medication is Pregnancy Category C and it isn't know if it is safe during pregnancy. It is unknown if this medication is excreted in breast milk but similar antibodies are known to be excreted.
Azathioprine Counseling:  I discussed with the patient the risks of azathioprine including but not limited to myelosuppression, immunosuppression, hepatotoxicity, lymphoma, and infections.  The patient understands that monitoring is required including baseline LFTs, Creatinine, possible TPMP genotyping and weekly CBCs for the first month and then every 2 weeks thereafter.  The patient verbalized understanding of the proper use and possible adverse effects of azathioprine.  All of the patient's questions and concerns were addressed.
Azathioprine Pregnancy And Lactation Text: This medication is Pregnancy Category D and isn't considered safe during pregnancy. It is unknown if this medication is excreted in breast milk.
Ketoconazole Pregnancy And Lactation Text: This medication is Pregnancy Category C and it isn't know if it is safe during pregnancy. It is also excreted in breast milk and breast feeding isn't recommended.
Siliq Counseling:  I discussed with the patient the risks of Siliq including but not limited to new or worsening depression, suicidal thoughts and behavior, immunosuppression, malignancy, posterior leukoencephalopathy syndrome, and serious infections.  The patient understands that monitoring is required including a PPD at baseline and must alert us or the primary physician if symptoms of infection or other concerning signs are noted. There is also a special program designed to monitor depression which is required with Siliq.
Bactrim Pregnancy And Lactation Text: This medication is Pregnancy Category D and is known to cause fetal risk.  It is also excreted in breast milk.
Isotretinoin Pregnancy And Lactation Text: This medication is Pregnancy Category X and is considered extremely dangerous during pregnancy. It is unknown if it is excreted in breast milk.
High Dose Vitamin A Counseling: Side effects reviewed, pt to contact office should one occur.
Odomzo Pregnancy And Lactation Text: This medication is Pregnancy Category X and is absolutely contraindicated during pregnancy. It is unknown if it is excreted in breast milk.
Zyclara Counseling:  I discussed with the patient the risks of imiquimod including but not limited to erythema, scaling, itching, weeping, crusting, and pain.  Patient understands that the inflammatory response to imiquimod is variable from person to person and was educated regarded proper titration schedule.  If flu-like symptoms develop, patient knows to discontinue the medication and contact us.
Imiquimod Counseling:  I discussed with the patient the risks of imiquimod including but not limited to erythema, scaling, itching, weeping, crusting, and pain.  Patient understands that the inflammatory response to imiquimod is variable from person to person and was educated regarded proper titration schedule.  If flu-like symptoms develop, patient knows to discontinue the medication and contact us.
Imiquimod Pregnancy And Lactation Text: This medication is Pregnancy Category C. It is unknown if this medication is excreted in breast milk.
Cephalexin Counseling: I counseled the patient regarding use of cephalexin as an antibiotic for prophylactic and/or therapeutic purposes. Cephalexin (commonly prescribed under brand name Keflex) is a cephalosporin antibiotic which is active against numerous classes of bacteria, including most skin bacteria. Side effects may include nausea, diarrhea, gastrointestinal upset, rash, hives, yeast infections, and in rare cases, hepatitis, kidney disease, seizures, fever, confusion, neurologic symptoms, and others. Patients with severe allergies to penicillin medications are cautioned that there is about a 10% incidence of cross-reactivity with cephalosporins. When possible, patients with penicillin allergies should use alternatives to cephalosporins for antibiotic therapy.
Siliq Pregnancy And Lactation Text: The risk during pregnancy and breastfeeding is uncertain with this medication.
Terbinafine Counseling: Patient counseling regarding adverse effects of terbinafine including but not limited to headache, diarrhea, rash, upset stomach, liver function test abnormalities, itching, taste/smell disturbance, nausea, abdominal pain, and flatulence.  There is a rare possibility of liver failure that can occur when taking terbinafine.  The patient understands that a baseline LFT and kidney function test may be required. The patient verbalized understanding of the proper use and possible adverse effects of terbinafine.  All of the patient's questions and concerns were addressed.
Cellcept Counseling:  I discussed with the patient the risks of mycophenolate mofetil including but not limited to infection/immunosuppression, GI upset, hypokalemia, hypercholesterolemia, bone marrow suppression, lymphoproliferative disorders, malignancy, GI ulceration/bleed/perforation, colitis, interstitial lung disease, kidney failure, progressive multifocal leukoencephalopathy, and birth defects.  The patient understands that monitoring is required including a baseline creatinine and regular CBC testing. In addition, patient must alert us immediately if symptoms of infection or other concerning signs are noted.
High Dose Vitamin A Pregnancy And Lactation Text: High dose vitamin A therapy is contraindicated during pregnancy and breast feeding.
Cimzia Counseling:  I discussed with the patient the risks of Cimzia including but not limited to immunosuppression, allergic reactions and infections.  The patient understands that monitoring is required including a PPD at baseline and must alert us or the primary physician if symptoms of infection or other concerning signs are noted.
Otezla Counseling: The side effects of Otezla were discussed with the patient, including but not limited to worsening or new depression, weight loss, diarrhea, nausea, upper respiratory tract infection, and headache. Patient instructed to call the office should any adverse effect occur.  The patient verbalized understanding of the proper use and possible adverse effects of Otezla.  All the patient's questions and concerns were addressed.
Terbinafine Pregnancy And Lactation Text: This medication is Pregnancy Category B and is considered safe during pregnancy. It is also excreted in breast milk and breast feeding isn't recommended.
Otezla Pregnancy And Lactation Text: This medication is Pregnancy Category C and it isn't known if it is safe during pregnancy. It is unknown if it is excreted in breast milk.
Minoxidil Counseling: Minoxidil is a topical medication which can increase blood flow where it is applied. It is uncertain how this medication increases hair growth. Side effects are uncommon and include stinging and allergic reactions.
Detail Level: Generalized
Simponi Counseling:  I discussed with the patient the risks of golimumab including but not limited to myelosuppression, immunosuppression, autoimmune hepatitis, demyelinating diseases, lymphoma, and serious infections.  The patient understands that monitoring is required including a PPD at baseline and must alert us or the primary physician if symptoms of infection or other concerning signs are noted.
Cephalexin Pregnancy And Lactation Text: This medication is Pregnancy Category B and considered safe during pregnancy.  It is also excreted in breast milk but can be used safely for shorter doses.
Cimzia Pregnancy And Lactation Text: This medication crosses the placenta but can be considered safe in certain situations. Cimzia may be excreted in breast milk.
Oxybutynin Counseling:  I discussed with the patient the risks of oxybutynin including but not limited to skin rash, drowsiness, dry mouth, difficulty urinating, and blurred vision.
Cyclophosphamide Counseling:  I discussed with the patient the risks of cyclophosphamide including but not limited to hair loss, hormonal abnormalities, decreased fertility, abdominal pain, diarrhea, nausea and vomiting, bone marrow suppression and infection. The patient understands that monitoring is required while taking this medication.
Clindamycin Counseling: I counseled the patient regarding use of clindamycin as an antibiotic for prophylactic and/or therapeutic purposes. Clindamycin is active against numerous classes of bacteria, including skin bacteria. Side effects may include nausea, diarrhea, gastrointestinal upset, rash, hives, yeast infections, and in rare cases, colitis.
Cimetidine Counseling:  I discussed with the patient the risks of Cimetidine including but not limited to gynecomastia, headache, diarrhea, nausea, drowsiness, arrhythmias, pancreatitis, skin rashes, psychosis, bone marrow suppression and kidney toxicity.
Benzoyl Peroxide Counseling: Patient counseled that medicine may cause skin irritation and bleach clothing.  In the event of skin irritation, the patient was advised to reduce the amount of the drug applied or use it less frequently.   The patient verbalized understanding of the proper use and possible adverse effects of benzoyl peroxide.  All of the patient's questions and concerns were addressed.
Picato Counseling:  I discussed with the patient the risks of Picato including but not limited to erythema, scaling, itching, weeping, crusting, and pain.
Rifampin Pregnancy And Lactation Text: This medication is Pregnancy Category C and it isn't know if it is safe during pregnancy. It is also excreted in breast milk and should not be used if you are breast feeding.
Clindamycin Pregnancy And Lactation Text: This medication can be used in pregnancy if certain situations. Clindamycin is also present in breast milk.
Arava Counseling:  Patient counseled regarding adverse effects of Arava including but not limited to nausea, vomiting, abnormalities in liver function tests. Patients may develop mouth sores, rash, diarrhea, and abnormalities in blood counts. The patient understands that monitoring is required including LFTs and blood counts.  There is a rare possibility of scarring of the liver and lung problems that can occur when taking methotrexate. Persistent nausea, loss of appetite, pale stools, dark urine, cough, and shortness of breath should be reported immediately. Patient advised to discontinue Arava treatment and consult with a physician prior to attempting conception. The patient will have to undergo a treatment to eliminate Arava from the body prior to conception.
Cosentyx Counseling:  I discussed with the patient the risks of Cosentyx including but not limited to worsening of Crohn's disease, immunosuppression, allergic reactions and infections.  The patient understands that monitoring is required including a PPD at baseline and must alert us or the primary physician if symptoms of infection or other concerning signs are noted.
Cyclophosphamide Pregnancy And Lactation Text: This medication is Pregnancy Category D and it isn't considered safe during pregnancy. This medication is excreted in breast milk.
Skyrizi Counseling: I discussed with the patient the risks of risankizumab-rzaa including but not limited to immunosuppression, and serious infections.  The patient understands that monitoring is required including a PPD at baseline and must alert us or the primary physician if symptoms of infection or other concerning signs are noted.
Cyclosporine Counseling:  I discussed with the patient the risks of cyclosporine including but not limited to hypertension, gingival hyperplasia,myelosuppression, immunosuppression, liver damage, kidney damage, neurotoxicity, lymphoma, and serious infections. The patient understands that monitoring is required including baseline blood pressure, CBC, CMP, lipid panel and uric acid, and then 1-2 times monthly CMP and blood pressure.
Dapsone Counseling: I discussed with the patient the risks of dapsone including but not limited to hemolytic anemia, agranulocytosis, rashes, methemoglobinemia, kidney failure, peripheral neuropathy, headaches, GI upset, and liver toxicity.  Patients who start dapsone require monitoring including baseline LFTs and weekly CBCs for the first month, then every month thereafter.  The patient verbalized understanding of the proper use and possible adverse effects of dapsone.  All of the patient's questions and concerns were addressed.
Sarecycline Counseling: Patient advised regarding possible photosensitivity and discoloration of the teeth, skin, lips, tongue and gums.  Patient instructed to avoid sunlight, if possible.  When exposed to sunlight, patients should wear protective clothing, sunglasses, and sunscreen.  The patient was instructed to call the office immediately if the following severe adverse effects occur:  hearing changes, easy bruising/bleeding, severe headache, or vision changes.  The patient verbalized understanding of the proper use and possible adverse effects of sarecycline.  All of the patient's questions and concerns were addressed.
Benzoyl Peroxide Pregnancy And Lactation Text: This medication is Pregnancy Category C. It is unknown if benzoyl peroxide is excreted in breast milk.
Carac Counseling:  I discussed with the patient the risks of Carac including but not limited to erythema, scaling, itching, weeping, crusting, and pain.
Birth Control Pills Counseling: Birth Control Pill Counseling: I discussed with the patient the potential side effects of OCPs including but not limited to increased risk of stroke, heart attack, thrombophlebitis, deep venous thrombosis, hepatic adenomas, breast changes, GI upset, headaches, and depression.  The patient verbalized understanding of the proper use and possible adverse effects of OCPs. All of the patient's questions and concerns were addressed.
Dupixent Counseling: I discussed with the patient the risks of dupilumab including but not limited to eye infection and irritation, cold sores, injection site reactions, worsening of asthma, allergic reactions and increased risk of parasitic infection.  Live vaccines should be avoided while taking dupilumab. Dupilumab will also interact with certain medications such as warfarin and cyclosporine. The patient understands that monitoring is required and they must alert us or the primary physician if symptoms of infection or other concerning signs are noted.
Doxycycline Counseling:  Patient counseled regarding possible photosensitivity and increased risk for sunburn.  Patient instructed to avoid sunlight, if possible.  When exposed to sunlight, patients should wear protective clothing, sunglasses, and sunscreen.  The patient was instructed to call the office immediately if the following severe adverse effects occur:  hearing changes, easy bruising/bleeding, severe headache, or vision changes.  The patient verbalized understanding of the proper use and possible adverse effects of doxycycline.  All of the patient's questions and concerns were addressed.
Clofazimine Counseling:  I discussed with the patient the risks of clofazimine including but not limited to skin and eye pigmentation, liver damage, nausea/vomiting, gastrointestinal bleeding and allergy.
Protopic Counseling: Patient may experience a mild burning sensation during topical application. Protopic is not approved in children less than 2 years of age. There have been case reports of hematologic and skin malignancies in patients using topical calcineurin inhibitors although causality is questionable.
Sarecycline Pregnancy And Lactation Text: This medication is Pregnancy Category D and not consider safe during pregnancy. It is also excreted in breast milk.
Doxycycline Pregnancy And Lactation Text: This medication is Pregnancy Category D and not consider safe during pregnancy. It is also excreted in breast milk but is considered safe for shorter treatment courses.
Stelara Counseling:  I discussed with the patient the risks of ustekinumab including but not limited to immunosuppression, malignancy, posterior leukoencephalopathy syndrome, and serious infections.  The patient understands that monitoring is required including a PPD at baseline and must alert us or the primary physician if symptoms of infection or other concerning signs are noted.
Dapsone Pregnancy And Lactation Text: This medication is Pregnancy Category C and is not considered safe during pregnancy or breast feeding.
Clofazimine Pregnancy And Lactation Text: This medication is Pregnancy Category C and isn't considered safe during pregnancy. It is excreted in breast milk.
Dupixent Pregnancy And Lactation Text: This medication likely crosses the placenta but the risk for the fetus is uncertain. This medication is excreted in breast milk.
Carac Pregnancy And Lactation Text: This medication is Pregnancy Category X and contraindicated in pregnancy and in women who may become pregnant. It is unknown if this medication is excreted in breast milk.
Cyclosporine Pregnancy And Lactation Text: This medication is Pregnancy Category C and it isn't know if it is safe during pregnancy. This medication is excreted in breast milk.
Doxepin Counseling:  Patient advised that the medication is sedating and not to drive a car after taking this medication. Patient informed of potential adverse effects including but not limited to dry mouth, urinary retention, and blurry vision.  The patient verbalized understanding of the proper use and possible adverse effects of doxepin.  All of the patient's questions and concerns were addressed.
Birth Control Pills Pregnancy And Lactation Text: This medication should be avoided if pregnant and for the first 30 days post-partum.
Tetracycline Counseling: Patient counseled regarding possible photosensitivity and increased risk for sunburn.  Patient instructed to avoid sunlight, if possible.  When exposed to sunlight, patients should wear protective clothing, sunglasses, and sunscreen.  The patient was instructed to call the office immediately if the following severe adverse effects occur:  hearing changes, easy bruising/bleeding, severe headache, or vision changes.  The patient verbalized understanding of the proper use and possible adverse effects of tetracycline.  All of the patient's questions and concerns were addressed. Patient understands to avoid pregnancy while on therapy due to potential birth defects.
Doxepin Pregnancy And Lactation Text: This medication is Pregnancy Category C and it isn't known if it is safe during pregnancy. It is also excreted in breast milk and breast feeding isn't recommended.
Spironolactone Counseling: Patient advised regarding risks of diarrhea, abdominal pain, hyperkalemia, birth defects (for female patients), liver toxicity and renal toxicity. The patient may need blood work to monitor liver and kidney function and potassium levels while on therapy. The patient verbalized understanding of the proper use and possible adverse effects of spironolactone.  All of the patient's questions and concerns were addressed.
Erivedge Counseling- I discussed with the patient the risks of Erivedge including but not limited to nausea, vomiting, diarrhea, constipation, weight loss, changes in the sense of taste, decreased appetite, muscle spasms, and hair loss.  The patient verbalized understanding of the proper use and possible adverse effects of Erivedge.  All of the patient's questions and concerns were addressed.
Protopic Pregnancy And Lactation Text: This medication is Pregnancy Category C. It is unknown if this medication is excreted in breast milk when applied topically.
Taltz Counseling: I discussed with the patient the risks of ixekizumab including but not limited to immunosuppression, serious infections, worsening of inflammatory bowel disease and drug reactions.  The patient understands that monitoring is required including a PPD at baseline and must alert us or the primary physician if symptoms of infection or other concerning signs are noted.
5-Fu Counseling: 5-Fluorouracil Counseling:  I discussed with the patient the risks of 5-fluorouracil including but not limited to erythema, scaling, itching, weeping, crusting, and pain.
Enbrel Counseling:  I discussed with the patient the risks of etanercept including but not limited to myelosuppression, immunosuppression, autoimmune hepatitis, demyelinating diseases, lymphoma, and infections.  The patient understands that monitoring is required including a PPD at baseline and must alert us or the primary physician if symptoms of infection or other concerning signs are noted.
Colchicine Counseling:  Patient counseled regarding adverse effects including but not limited to stomach upset (nausea, vomiting, stomach pain, or diarrhea).  Patient instructed to limit alcohol consumption while taking this medication.  Colchicine may reduce blood counts especially with prolonged use.  The patient understands that monitoring of kidney function and blood counts may be required, especially at baseline. The patient verbalized understanding of the proper use and possible adverse effects of colchicine.  All of the patient's questions and concerns were addressed.
Methotrexate Counseling:  Patient counseled regarding adverse effects of methotrexate including but not limited to nausea, vomiting, abnormalities in liver function tests. Patients may develop mouth sores, rash, diarrhea, and abnormalities in blood counts. The patient understands that monitoring is required including LFT's and blood counts.  There is a rare possibility of scarring of the liver and lung problems that can occur when taking methotrexate. Persistent nausea, loss of appetite, pale stools, dark urine, cough, and shortness of breath should be reported immediately. Patient advised to discontinue methotrexate treatment at least three months before attempting to become pregnant.  I discussed the need for folate supplements while taking methotrexate.  These supplements can decrease side effects during methotrexate treatment. The patient verbalized understanding of the proper use and possible adverse effects of methotrexate.  All of the patient's questions and concerns were addressed.
Erythromycin Counseling:  I discussed with the patient the risks of erythromycin including but not limited to GI upset, allergic reaction, drug rash, diarrhea, increase in liver enzymes, and yeast infections.
Spironolactone Pregnancy And Lactation Text: This medication can cause feminization of the male fetus and should be avoided during pregnancy. The active metabolite is also found in breast milk.
Erythromycin Pregnancy And Lactation Text: This medication is Pregnancy Category B and is considered safe during pregnancy. It is also excreted in breast milk.
Methotrexate Pregnancy And Lactation Text: This medication is Pregnancy Category X and is known to cause fetal harm. This medication is excreted in breast milk.
Hydroxyzine Counseling: Patient advised that the medication is sedating and not to drive a car after taking this medication.  Patient informed of potential adverse effects including but not limited to dry mouth, urinary retention, and blurry vision.  The patient verbalized understanding of the proper use and possible adverse effects of hydroxyzine.  All of the patient's questions and concerns were addressed.
Solaraze Counseling:  I discussed with the patient the risks of Solaraze including but not limited to erythema, scaling, itching, weeping, crusting, and pain.
Solaraze Pregnancy And Lactation Text: This medication is Pregnancy Category B and is considered safe. There is some data to suggest avoiding during the third trimester. It is unknown if this medication is excreted in breast milk.
Hydroxyzine Pregnancy And Lactation Text: This medication is not safe during pregnancy and should not be taken. It is also excreted in breast milk and breast feeding isn't recommended.
Metronidazole Counseling:  I discussed with the patient the risks of metronidazole including but not limited to seizures, nausea/vomiting, a metallic taste in the mouth, nausea/vomiting and severe allergy.
Humira Counseling:  I discussed with the patient the risks of adalimumab including but not limited to myelosuppression, immunosuppression, autoimmune hepatitis, demyelinating diseases, lymphoma, and serious infections.  The patient understands that monitoring is required including a PPD at baseline and must alert us or the primary physician if symptoms of infection or other concerning signs are noted.
Prednisone Counseling:  I discussed with the patient the risks of prolonged use of prednisone including but not limited to weight gain, insomnia, osteoporosis, mood changes, diabetes, susceptibility to infection, glaucoma and high blood pressure.  In cases where prednisone use is prolonged, patients should be monitored with blood pressure checks, serum glucose levels and an eye exam.  Additionally, the patient may need to be placed on GI prophylaxis, PCP prophylaxis, and calcium and vitamin D supplementation and/or a bisphosphonate.  The patient verbalized understanding of the proper use and the possible adverse effects of prednisone.  All of the patient's questions and concerns were addressed.
Gabapentin Counseling: I discussed with the patient the risks of gabapentin including but not limited to dizziness, somnolence, fatigue and ataxia.
Tremfya Counseling: I discussed with the patient the risks of guselkumab including but not limited to immunosuppression, serious infections, worsening of inflammatory bowel disease and drug reactions.  The patient understands that monitoring is required including a PPD at baseline and must alert us or the primary physician if symptoms of infection or other concerning signs are noted.
SSKI Counseling:  I discussed with the patient the risks of SSKI including but not limited to thyroid abnormalities, metallic taste, GI upset, fever, headache, acne, arthralgias, paraesthesias, lymphadenopathy, easy bleeding, arrhythmias, and allergic reaction.
Drysol Counseling:  I discussed with the patient the risks of drysol/aluminum chloride including but not limited to skin rash, itching, irritation, burning.
Drysol Pregnancy And Lactation Text: This medication is considered safe during pregnancy and breast feeding.
Sski Pregnancy And Lactation Text: This medication is Pregnancy Category D and isn't considered safe during pregnancy. It is excreted in breast milk.
Topical Retinoid counseling:  Patient advised to apply a pea-sized amount only at bedtime and wait 30 minutes after washing their face before applying.  If too drying, patient may add a non-comedogenic moisturizer. The patient verbalized understanding of the proper use and possible adverse effects of retinoids.  All of the patient's questions and concerns were addressed.
Fluconazole Counseling:  Patient counseled regarding adverse effects of fluconazole including but not limited to headache, diarrhea, nausea, upset stomach, liver function test abnormalities, taste disturbance, and stomach pain.  There is a rare possibility of liver failure that can occur when taking fluconazole.  The patient understands that monitoring of LFTs and kidney function test may be required, especially at baseline. The patient verbalized understanding of the proper use and possible adverse effects of fluconazole.  All of the patient's questions and concerns were addressed.
Metronidazole Pregnancy And Lactation Text: This medication is Pregnancy Category B and considered safe during pregnancy.  It is also excreted in breast milk.
Glycopyrrolate Counseling:  I discussed with the patient the risks of glycopyrrolate including but not limited to skin rash, drowsiness, dry mouth, difficulty urinating, and blurred vision.
Minocycline Counseling: Patient advised regarding possible photosensitivity and discoloration of the teeth, skin, lips, tongue and gums.  Patient instructed to avoid sunlight, if possible.  When exposed to sunlight, patients should wear protective clothing, sunglasses, and sunscreen.  The patient was instructed to call the office immediately if the following severe adverse effects occur:  hearing changes, easy bruising/bleeding, severe headache, or vision changes.  The patient verbalized understanding of the proper use and possible adverse effects of minocycline.  All of the patient's questions and concerns were addressed.
Elidel Counseling: Patient may experience a mild burning sensation during topical application. Elidel is not approved in children less than 2 years of age. There have been case reports of hematologic and skin malignancies in patients using topical calcineurin inhibitors although causality is questionable.
Albendazole Counseling:  I discussed with the patient the risks of albendazole including but not limited to cytopenia, kidney damage, nausea/vomiting and severe allergy.  The patient understands that this medication is being used in an off-label manner.
Thalidomide Counseling: I discussed with the patient the risks of thalidomide including but not limited to birth defects, anxiety, weakness, chest pain, dizziness, cough and severe allergy.
Tazorac Counseling:  Patient advised that medication is irritating and drying.  Patient may need to apply sparingly and wash off after an hour before eventually leaving it on overnight.  The patient verbalized understanding of the proper use and possible adverse effects of tazorac.  All of the patient's questions and concerns were addressed.
Glycopyrrolate Pregnancy And Lactation Text: This medication is Pregnancy Category B and is considered safe during pregnancy. It is unknown if it is excreted breast milk.
Ilumya Counseling: I discussed with the patient the risks of tildrakizumab including but not limited to immunosuppression, malignancy, posterior leukoencephalopathy syndrome, and serious infections.  The patient understands that monitoring is required including a PPD at baseline and must alert us or the primary physician if symptoms of infection or other concerning signs are noted.
Xeljanz Counseling: I discussed with the patient the risks of Xeljanz therapy including increased risk of infection, liver issues, headache, diarrhea, or cold symptoms. Live vaccines should be avoided. They were instructed to call if they have any problems.
Xelnickyz Pregnancy And Lactation Text: This medication is Pregnancy Category D and is not considered safe during pregnancy.  The risk during breast feeding is also uncertain.
Acitretin Counseling:  I discussed with the patient the risks of acitretin including but not limited to hair loss, dry lips/skin/eyes, liver damage, hyperlipidemia, depression/suicidal ideation, photosensitivity.  Serious rare side effects can include but are not limited to pancreatitis, pseudotumor cerebri, bony changes, clot formation/stroke/heart attack.  Patient understands that alcohol is contraindicated since it can result in liver toxicity and significantly prolong the elimination of the drug by many years.
Griseofulvin Counseling:  I discussed with the patient the risks of griseofulvin including but not limited to photosensitivity, cytopenia, liver damage, nausea/vomiting and severe allergy.  The patient understands that this medication is best absorbed when taken with a fatty meal (e.g., ice cream or french fries).
Hydroxychloroquine Counseling:  I discussed with the patient that a baseline ophthalmologic exam is needed at the start of therapy and every year thereafter while on therapy. A CBC may also be warranted for monitoring.  The side effects of this medication were discussed with the patient, including but not limited to agranulocytosis, aplastic anemia, seizures, rashes, retinopathy, and liver toxicity. Patient instructed to call the office should any adverse effect occur.  The patient verbalized understanding of the proper use and possible adverse effects of Plaquenil.  All the patient's questions and concerns were addressed.
Acitretin Pregnancy And Lactation Text: This medication is Pregnancy Category X and should not be given to women who are pregnant or may become pregnant in the future. This medication is excreted in breast milk.
Valtrex Counseling: I discussed with the patient the risks of valacyclovir including but not limited to kidney damage, nausea, vomiting and severe allergy.  The patient understands that if the infection seems to be worsening or is not improving, they are to call.
Ivermectin Counseling:  Patient instructed to take medication on an empty stomach with a full glass of water.  Patient informed of potential adverse effects including but not limited to nausea, diarrhea, dizziness, itching, and swelling of the extremities or lymph nodes.  The patient verbalized understanding of the proper use and possible adverse effects of ivermectin.  All of the patient's questions and concerns were addressed.
Tazorac Pregnancy And Lactation Text: This medication is not safe during pregnancy. It is unknown if this medication is excreted in breast milk.
Topical Clindamycin Counseling: Patient counseled that this medication may cause skin irritation or allergic reactions.  In the event of skin irritation, the patient was advised to reduce the amount of the drug applied or use it less frequently.   The patient verbalized understanding of the proper use and possible adverse effects of clindamycin.  All of the patient's questions and concerns were addressed.
Eucrisa Counseling: Patient may experience a mild burning sensation during topical application. Eucrisa is not approved in children less than 2 years of age.
Quinolones Counseling:  I discussed with the patient the risks of fluoroquinolones including but not limited to GI upset, allergic reaction, drug rash, diarrhea, dizziness, photosensitivity, yeast infections, liver function test abnormalities, tendonitis/tendon rupture.
Infliximab Counseling:  I discussed with the patient the risks of infliximab including but not limited to myelosuppression, immunosuppression, autoimmune hepatitis, demyelinating diseases, lymphoma, and serious infections.  The patient understands that monitoring is required including a PPD at baseline and must alert us or the primary physician if symptoms of infection or other concerning signs are noted.
Griseofulvin Pregnancy And Lactation Text: This medication is Pregnancy Category X and is known to cause serious birth defects. It is unknown if this medication is excreted in breast milk but breast feeding should be avoided.

## 2020-08-04 NOTE — PROCEDURE: TREATMENT REGIMEN
Plan: Previous biopsy showed folliculitis, but it does not match clinically with rash on scalp; may have been sampling error
Detail Level: Zone
Otc Regimen: Allegra QAM (continue), Claritin QHS (continue)
Continue Regimen: Olux 0.05% foam BID PRN flares\\nKetoconazole 2% shampoo BIW-TIW\\nKetoconazole 2% cream BID to ears
Continue Regimen: Elidel 1% cream bid\\nKetoconazole 2% cream bid\\nHydrocortisone butyrate 0.1% cream bid prn flares
Plan: Patient has had rash for years. Discussed R/B/SE of Dupixent for treatment to consider due to history of asthma. Patient is agreeable due to intense itching. Will check coverage for Dupixent
Otc Regimen: Wetting eye drops QD PRN for when patient starts Dupixent
Detail Level: Simple
Initiate Treatment: Valtrex 1g 2 PO QD at first sign, then repeat 12 hours later

## 2020-09-01 ENCOUNTER — APPOINTMENT (RX ONLY)
Dept: URBAN - METROPOLITAN AREA CLINIC 125 | Facility: CLINIC | Age: 55
Setting detail: DERMATOLOGY
End: 2020-09-01

## 2020-09-01 DIAGNOSIS — Z79.899 OTHER LONG TERM (CURRENT) DRUG THERAPY: ICD-10-CM

## 2020-09-01 DIAGNOSIS — B00.1 HERPESVIRAL VESICULAR DERMATITIS: ICD-10-CM

## 2020-09-01 DIAGNOSIS — L20.89 OTHER ATOPIC DERMATITIS: ICD-10-CM | Status: INADEQUATELY CONTROLLED

## 2020-09-01 DIAGNOSIS — L663 OTHER SPECIFIED DISEASES OF HAIR AND HAIR FOLLICLES: ICD-10-CM

## 2020-09-01 DIAGNOSIS — L738 OTHER SPECIFIED DISEASES OF HAIR AND HAIR FOLLICLES: ICD-10-CM

## 2020-09-01 DIAGNOSIS — L73.9 FOLLICULAR DISORDER, UNSPECIFIED: ICD-10-CM

## 2020-09-01 DIAGNOSIS — L259 CONTACT DERMATITIS AND OTHER ECZEMA, UNSPECIFIED CAUSE: ICD-10-CM | Status: INADEQUATELY CONTROLLED

## 2020-09-01 PROBLEM — L30.9 DERMATITIS, UNSPECIFIED: Status: ACTIVE | Noted: 2020-09-01

## 2020-09-01 PROBLEM — L02.821 FURUNCLE OF HEAD [ANY PART, EXCEPT FACE]: Status: ACTIVE | Noted: 2020-09-01

## 2020-09-01 PROCEDURE — ? BIOLOGIC INJECTION TRAINING

## 2020-09-01 PROCEDURE — 99214 OFFICE O/P EST MOD 30 MIN: CPT | Mod: 25

## 2020-09-01 PROCEDURE — ? COUNSELING

## 2020-09-01 PROCEDURE — ? ORDER TESTS

## 2020-09-01 PROCEDURE — ? HIGH RISK MEDICATION MONITORING

## 2020-09-01 PROCEDURE — ? INJECTION

## 2020-09-01 PROCEDURE — 96372 THER/PROPH/DIAG INJ SC/IM: CPT

## 2020-09-01 PROCEDURE — ? DUPIXENT INITIATION

## 2020-09-01 PROCEDURE — ? TREATMENT REGIMEN

## 2020-09-01 ASSESSMENT — LOCATION DETAILED DESCRIPTION DERM
LOCATION DETAILED: LEFT MEDIAL FRONTAL SCALP
LOCATION DETAILED: LEFT INFERIOR VERMILION LIP
LOCATION DETAILED: LEFT INFERIOR CRUS OF ANTIHELIX
LOCATION DETAILED: LEFT AXILLARY VAULT
LOCATION DETAILED: RIGHT ANTERIOR PROXIMAL THIGH
LOCATION DETAILED: LEFT ANTERIOR PROXIMAL THIGH
LOCATION DETAILED: LEFT ANTIHELIX
LOCATION DETAILED: RIGHT CYMBA CONCHA
LOCATION DETAILED: MID-OCCIPITAL SCALP

## 2020-09-01 ASSESSMENT — LOCATION SIMPLE DESCRIPTION DERM
LOCATION SIMPLE: LEFT THIGH
LOCATION SIMPLE: LEFT SCALP
LOCATION SIMPLE: LEFT LIP
LOCATION SIMPLE: RIGHT THIGH
LOCATION SIMPLE: LEFT EAR
LOCATION SIMPLE: RIGHT EAR
LOCATION SIMPLE: LEFT AXILLARY VAULT
LOCATION SIMPLE: POSTERIOR SCALP

## 2020-09-01 ASSESSMENT — LOCATION ZONE DERM
LOCATION ZONE: LEG
LOCATION ZONE: SCALP
LOCATION ZONE: LIP
LOCATION ZONE: EAR
LOCATION ZONE: AXILLAE

## 2020-09-01 ASSESSMENT — BSA ECZEMA: % BODY COVERED IN ECZEMA: 3

## 2020-09-01 ASSESSMENT — SEVERITY ASSESSMENT 2020: SEVERITY 2020: SEVERE

## 2020-09-01 NOTE — PROCEDURE: INJECTION
Hide Second Medication?: No
Lot # (Optional): [id number]
Units: mg
Total Volume Injected In Cc (Will Not Affected Billing): 4
Route: SC
Consent: The risks of the medication was reviewed with the patient.
Dose Administered (Numbers Only): 600
Units: cc
Treatment Number: 1
Administered By (Optional): Drea Oleary MA
Procedure Information: Please note that the numeric value listed in the Medication (1) and associated J-code units and Medication (2) and associated J-code units variables are j-code amounts and do not represent either the concentration or the total amount of the medications injected.  I strongly recommend selecting no to the Render J-code information in note question. This will allow your note to be more clear. If you are billing j-codes with your injection codes you need to document the total amount of the medication injected. This amount should match the j-code units. For example, if you are injecting Triamcinolone 40mg as an intramuscular injection you would select 40 for the dose field and mg for the units. This would allow you to document  with 4 units (40mg = 10mg x 4). The total volume is not used to calculate j-codes only the amount of the medication administered.
Additional Comments: First injection by MJ. Second injection by the patient.
Expiration Date (Optional): 1/2023
Detail Level: Detailed
Medication (1) And Associated J-Code Units: Dupixent, 300mg
Dose Administered (Numbers Only): 0
Post-Care Instructions: I reviewed with the patient in detail post-care instructions. Patient understands to keep the injection sites clean and call the clinic if there is any redness, swelling or pain.
Render J-Code Information In Note?: yes

## 2020-09-01 NOTE — HPI: TESTING (BLOOD WORK)
Have You Had Previous Labs For This Condition?: has had previous labs
When Was Your Last Blood Work Done?: 8/4/2020

## 2020-09-01 NOTE — PROCEDURE: TREATMENT REGIMEN
Initiate Treatment: Dupixent 600mg SC on day 0, 300mg SC QOWK thereafter
Continue Regimen: Systane eye drops QD x6 weeks
Detail Level: Simple
Continue Regimen: Valtrex 1g 2 PO QD at first sign, then repeat 12 hours later
Plan: Previous biopsy showed folliculitis, but it does not match clinically with rash on scalp; may have been sampling error
Detail Level: Zone
Otc Regimen: Allegra QAM (continue), Claritin QHS (continue)
Continue Regimen: Olux 0.05% foam BID PRN flares\\nKetoconazole 2% shampoo BIW-TIW\\nKetoconazole 2% cream BID to ears
Continue Regimen: Elidel 1% cream bid\\nKetoconazole 2% cream bid\\nHydrocortisone butyrate 0.1% cream bid prn flares

## 2020-09-01 NOTE — PROCEDURE: ORDER TESTS
Bill For Surgical Tray: no
Billing Type: Third-Party Bill
Expected Date Of Service: 09/01/2020
Performing Laboratory: [id number]

## 2020-09-01 NOTE — PROCEDURE: BIOLOGIC INJECTION TRAINING
Taltz Training Text: We discussed Taltz injection.  I demonstrated how to clean the skin and administer the medication.
Dupixent Training Text: We discussed Dupixent injection.  I demonstrated how to clean the skin and administer the medication.
Ilumya Training Text: We discussed Ilumya injection.  I demonstrated how to clean the skin and administer the medication.
Tremfya Training Text: We discussed Tremfya injection.  I demonstrated how to clean the skin and administer the medication.
Cimzia Training Text: We discussed Cimzia injection.  I demonstrated how to clean the skin and administer the medication.
Cosentyx Training Text: We discussed Cosentyx injection.  I demonstrated how to clean the skin and administer the medication.
Enbrel Training Text: We discussed Enbrel injection.  I demonstrated how to clean the skin and administer the medication.
Which Biologic Is The Patient Receiving Training For?: Dupixent
Simponi Training Text: We discussed Simponi injection.  I demonstrated how to clean the skin and administer the medication.
Stelara Training Text: We discussed Stelara injection.  I demonstrated how to clean the skin and administer the medication.
Humira Training Text: We discussed Humira injection.  I demonstrated how to clean the skin and administer the medication.
Who Did You Train: The Patient
Skyrizi Training Text: We discussed Skyrizi injection.  I demonstrated how to clean the skin and administer the medication.
Siliq Training Text: We discussed Siliq injection.  I demonstrated how to clean the skin and administer the medication.
Detail Level: Simple

## 2020-10-06 ENCOUNTER — APPOINTMENT (RX ONLY)
Dept: URBAN - METROPOLITAN AREA CLINIC 125 | Facility: CLINIC | Age: 55
Setting detail: DERMATOLOGY
End: 2020-10-06

## 2020-10-06 DIAGNOSIS — M25.50 PAIN IN UNSPECIFIED JOINT: ICD-10-CM

## 2020-10-06 DIAGNOSIS — L259 CONTACT DERMATITIS AND OTHER ECZEMA, UNSPECIFIED CAUSE: ICD-10-CM

## 2020-10-06 DIAGNOSIS — L20.89 OTHER ATOPIC DERMATITIS: ICD-10-CM | Status: IMPROVED

## 2020-10-06 DIAGNOSIS — B00.1 HERPESVIRAL VESICULAR DERMATITIS: ICD-10-CM

## 2020-10-06 DIAGNOSIS — Z79.899 OTHER LONG TERM (CURRENT) DRUG THERAPY: ICD-10-CM

## 2020-10-06 PROBLEM — L23.9 ALLERGIC CONTACT DERMATITIS, UNSPECIFIED CAUSE: Status: ACTIVE | Noted: 2020-10-06

## 2020-10-06 PROCEDURE — 99214 OFFICE O/P EST MOD 30 MIN: CPT

## 2020-10-06 PROCEDURE — ? PRESCRIPTION

## 2020-10-06 PROCEDURE — ? HIGH RISK MEDICATION MONITORING

## 2020-10-06 PROCEDURE — ? CHRONOLOGY OF PRESENT ILLNESS

## 2020-10-06 PROCEDURE — ? DUPIXENT MONITORING

## 2020-10-06 PROCEDURE — ? COUNSELING

## 2020-10-06 PROCEDURE — ? ORDER TESTS

## 2020-10-06 PROCEDURE — ? TREATMENT REGIMEN

## 2020-10-06 RX ORDER — TACROLIMUS 1 MG/G
OINTMENT TOPICAL
Qty: 1 | Refills: 1 | Status: ERX | COMMUNITY
Start: 2020-10-06

## 2020-10-06 RX ADMIN — TACROLIMUS: 1 OINTMENT TOPICAL at 00:00

## 2020-10-06 ASSESSMENT — LOCATION ZONE DERM
LOCATION ZONE: SCALP
LOCATION ZONE: LIP
LOCATION ZONE: EAR
LOCATION ZONE: ARM
LOCATION ZONE: AXILLAE

## 2020-10-06 ASSESSMENT — LOCATION SIMPLE DESCRIPTION DERM
LOCATION SIMPLE: LEFT ELBOW
LOCATION SIMPLE: RIGHT EAR
LOCATION SIMPLE: RIGHT SHOULDER
LOCATION SIMPLE: LEFT EAR
LOCATION SIMPLE: RIGHT ELBOW
LOCATION SIMPLE: LEFT SCALP
LOCATION SIMPLE: LEFT LIP
LOCATION SIMPLE: LEFT AXILLARY VAULT

## 2020-10-06 ASSESSMENT — LOCATION DETAILED DESCRIPTION DERM
LOCATION DETAILED: RIGHT CAVUM CONCHA
LOCATION DETAILED: RIGHT POSTERIOR SHOULDER
LOCATION DETAILED: RIGHT ELBOW
LOCATION DETAILED: LEFT ELBOW
LOCATION DETAILED: LEFT MEDIAL FRONTAL SCALP
LOCATION DETAILED: LEFT CAVUM CONCHA
LOCATION DETAILED: LEFT INFERIOR VERMILION LIP
LOCATION DETAILED: RIGHT CYMBA CONCHA
LOCATION DETAILED: LEFT AXILLARY VAULT
LOCATION DETAILED: LEFT INFERIOR CRUS OF ANTIHELIX

## 2020-10-06 NOTE — PROCEDURE: ORDER TESTS
Bill For Surgical Tray: no
Billing Type: Third-Party Bill
Expected Date Of Service: 09/01/2020
Performing Laboratory: [id number]

## 2020-10-06 NOTE — PROCEDURE: CHRONOLOGY OF PRESENT ILLNESS
Chronology Of Present Illness: 8/8/2019:  Rash, located on the right ear and scalp. The rash is scaly, red, and itchy. The rash has been present for years. She has arthritis. She is not currently on any medications. She was started on Olux foam and ketoconazole cream.\\n11/17/2019: scalp is improved, ears are not. 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 She was started on hydrocortisone butuyrate and ketoconazole for the underarms. Biopsy obtained (eczematous dermatitis)\\n1/3/2020: Axillae have improved. scalp and ears as well. Continue current topicals and Pramosone lotion was added for the underarms.\\n2/5/2020: drastic improvement of axillae. She discontinued Hydrocortisone cream and is only using ketoconazole cream. scalp and ears are stable. Start Elidel cream to ears. \\n3/25/2020: scalp and ears not improved. Discussed Dupixent with patient. Consider after Cn19. Continue current topicals.\\n5/27/2020: scalp still flaring. Punch bx obtained of neck. (folliculitis, but not c/w clinical symptoms and presentation. Favor eczematous derm/AD. Continue current topicals. \\n8/4/2020: rash not well controlled on topicals. Start Dupixent enrollment. Full biologic labs obtained. RX Valtrex given.\\n9/1/2020: Dupixent start today. Recommend Systane eyedrops and Valtrex PRN as needed. \\n10/6/2020: Patient is somewhat improved, overall itching is better. Her ears are still very itchy. She is using Olux foam and ketoconazole cream to ears. She complains of new onset of joint pain above elbows and shoulders. She will see Dr. Dai and we will send letter.
Detail Level: Zone

## 2020-10-06 NOTE — PROCEDURE: TREATMENT REGIMEN
Plan: Consider ILK to ears at follow up if not improved
Initiate Treatment: Protopic ointment 0.1% QD - BID to ears
Continue Regimen: Systane eye drops QD \\nDupixent 300mg SC QOWK\\n\\n\\nOlux 0.05% foam BID PRN flares\\nKetoconazole 2% shampoo BIW-TIW\\nKetoconazole 2% cream BID to ears. \\nRecommend the following Over-The-Counter treatment(s): Allegra QAM (continue), Claritin QHS (continue).
Detail Level: Simple
Continue Regimen: Valtrex 1g 2 PO QD at first sign, then repeat 12 hours later
Plan: Consider switching to daily dosing if not improving
Initiate Treatment: Protopic ointment BID ears
Plan: Recommend call the  of her hearing aids to obtain a list of ingredients to possibly do patch testing in the future
Plan: Recommend eval with Dr. Dai

## 2020-10-06 NOTE — PROCEDURE: DUPIXENT MONITORING
Initial Cmp (Optional): 8/4/2020
Detail Level: Zone
Length Of Therapy Override: 5 weeks
Add High Risk Medication Management Associated Diagnosis?: No
Comments: Start 9/1/2020
Patient Reported Weight(Optional But Include Units): 215

## 2020-10-06 NOTE — PROCEDURE: HIGH RISK MEDICATION MONITORING
Itraconazole Counseling:  I discussed with the patient the risks of itraconazole including but not limited to liver damage, nausea/vomiting, neuropathy, and severe allergy.  The patient understands that this medication is best absorbed when taken with acidic beverages such as non-diet cola or ginger ale.  The patient understands that monitoring is required including baseline LFTs and repeat LFTs at intervals.  The patient understands that they are to contact us or the primary physician if concerning signs are noted.
Azithromycin Counseling:  I discussed with the patient the risks of azithromycin including but not limited to GI upset, allergic reaction, drug rash, diarrhea, and yeast infections.
Ivermectin Pregnancy And Lactation Text: This medication is Pregnancy Category C and it isn't known if it is safe during pregnancy. It is also excreted in breast milk.
Infliximab Pregnancy And Lactation Text: This medication is Pregnancy Category B and is considered safe during pregnancy. It is unknown if this medication is excreted in breast milk.
Xolair Counseling:  Patient informed of potential adverse effects including but not limited to fever, muscle aches, rash and allergic reactions.  The patient verbalized understanding of the proper use and possible adverse effects of Xolair.  All of the patient's questions and concerns were addressed.
Bexarotene Counseling:  I discussed with the patient the risks of bexarotene including but not limited to hair loss, dry lips/skin/eyes, liver abnormalities, hyperlipidemia, pancreatitis, depression/suicidal ideation, photosensitivity, drug rash/allergic reactions, hypothyroidism, anemia, leukopenia, infection, cataracts, and teratogenicity.  Patient understands that they will need regular blood tests to check lipid profile, liver function tests, white blood cell count, thyroid function tests and pregnancy test if applicable.
Hydroxychloroquine Pregnancy And Lactation Text: This medication has been shown to cause fetal harm but it isn't assigned a Pregnancy Risk Category. There are small amounts excreted in breast milk.
Xolair Pregnancy And Lactation Text: This medication is Pregnancy Category B and is considered safe during pregnancy. This medication is excreted in breast milk.
Nsaids Counseling: NSAID Counseling: I discussed with the patient that NSAIDs should be taken with food. Prolonged use of NSAIDs can result in the development of stomach ulcers.  Patient advised to stop taking NSAIDs if abdominal pain occurs.  The patient verbalized understanding of the proper use and possible adverse effects of NSAIDs.  All of the patient's questions and concerns were addressed.
Valtrex Pregnancy And Lactation Text: this medication is Pregnancy Category B and is considered safe during pregnancy. This medication is not directly found in breast milk but it's metabolite acyclovir is present.
Eucrisa Pregnancy And Lactation Text: This medication has not been assigned a Pregnancy Risk Category but animal studies failed to show danger with the topical medication. It is unknown if the medication is excreted in breast milk.
Quinolones Pregnancy And Lactation Text: This medication is Pregnancy Category C and it isn't know if it is safe during pregnancy. It is also excreted in breast milk.
Rituxan Counseling:  I discussed with the patient the risks of Rituxan infusions. Side effects can include infusion reactions, severe drug rashes including mucocutaneous reactions, reactivation of latent hepatitis and other infections and rarely progressive multifocal leukoencephalopathy.  All of the patient's questions and concerns were addressed.
Topical Clindamycin Pregnancy And Lactation Text: This medication is Pregnancy Category B and is considered safe during pregnancy. It is unknown if it is excreted in breast milk.
Bexarotene Pregnancy And Lactation Text: This medication is Pregnancy Category X and should not be given to women who are pregnant or may become pregnant. This medication should not be used if you are breast feeding.
Nsaids Pregnancy And Lactation Text: These medications are considered safe up to 30 weeks gestation. It is excreted in breast milk.
Topical Sulfur Applications Counseling: Topical Sulfur Counseling: Patient counseled that this medication may cause skin irritation or allergic reactions.  In the event of skin irritation, the patient was advised to reduce the amount of the drug applied or use it less frequently.   The patient verbalized understanding of the proper use and possible adverse effects of topical sulfur application.  All of the patient's questions and concerns were addressed.
Rifampin Counseling: I discussed with the patient the risks of rifampin including but not limited to liver damage, kidney damage, red-orange body fluids, nausea/vomiting and severe allergy.
Azithromycin Pregnancy And Lactation Text: This medication is considered safe during pregnancy and is also secreted in breast milk.
Hydroquinone Counseling:  Patient advised that medication may result in skin irritation, lightening (hypopigmentation), dryness, and burning.  In the event of skin irritation, the patient was advised to reduce the amount of the drug applied or use it less frequently.  Rarely, spots that are treated with hydroquinone can become darker (pseudoochronosis).  Should this occur, patient instructed to stop medication and call the office. The patient verbalized understanding of the proper use and possible adverse effects of hydroquinone.  All of the patient's questions and concerns were addressed.
Bactrim Counseling:  I discussed with the patient the risks of sulfa antibiotics including but not limited to GI upset, allergic reaction, drug rash, diarrhea, dizziness, photosensitivity, and yeast infections.  Rarely, more serious reactions can occur including but not limited to aplastic anemia, agranulocytosis, methemoglobinemia, blood dyscrasias, liver or kidney failure, lung infiltrates or desquamative/blistering drug rashes.
Ketoconazole Counseling:   Patient counseled regarding improving absorption with orange juice.  Adverse effects include but are not limited to breast enlargement, headache, diarrhea, nausea, upset stomach, liver function test abnormalities, taste disturbance, and stomach pain.  There is a rare possibility of liver failure that can occur when taking ketoconazole. The patient understands that monitoring of LFTs may be required, especially at baseline. The patient verbalized understanding of the proper use and possible adverse effects of ketoconazole.  All of the patient's questions and concerns were addressed.
Isotretinoin Counseling: Patient should get monthly blood tests, not donate blood, not drive at night if vision affected, not share medication, and not undergo elective surgery for 6 months after tx completed. Side effects reviewed, pt to contact office should one occur.
Use Enhanced Medication Counseling?: No
Odomzo Counseling- I discussed with the patient the risks of Odomzo including but not limited to nausea, vomiting, diarrhea, constipation, weight loss, changes in the sense of taste, decreased appetite, muscle spasms, and hair loss.  The patient verbalized understanding of the proper use and possible adverse effects of Odomzo.  All of the patient's questions and concerns were addressed.
Topical Sulfur Applications Pregnancy And Lactation Text: This medication is Pregnancy Category C and has an unknown safety profile during pregnancy. It is unknown if this topical medication is excreted in breast milk.
Rituxan Pregnancy And Lactation Text: This medication is Pregnancy Category C and it isn't know if it is safe during pregnancy. It is unknown if this medication is excreted in breast milk but similar antibodies are known to be excreted.
Azathioprine Counseling:  I discussed with the patient the risks of azathioprine including but not limited to myelosuppression, immunosuppression, hepatotoxicity, lymphoma, and infections.  The patient understands that monitoring is required including baseline LFTs, Creatinine, possible TPMP genotyping and weekly CBCs for the first month and then every 2 weeks thereafter.  The patient verbalized understanding of the proper use and possible adverse effects of azathioprine.  All of the patient's questions and concerns were addressed.
Azathioprine Pregnancy And Lactation Text: This medication is Pregnancy Category D and isn't considered safe during pregnancy. It is unknown if this medication is excreted in breast milk.
Ketoconazole Pregnancy And Lactation Text: This medication is Pregnancy Category C and it isn't know if it is safe during pregnancy. It is also excreted in breast milk and breast feeding isn't recommended.
Siliq Counseling:  I discussed with the patient the risks of Siliq including but not limited to new or worsening depression, suicidal thoughts and behavior, immunosuppression, malignancy, posterior leukoencephalopathy syndrome, and serious infections.  The patient understands that monitoring is required including a PPD at baseline and must alert us or the primary physician if symptoms of infection or other concerning signs are noted. There is also a special program designed to monitor depression which is required with Siliq.
Bactrim Pregnancy And Lactation Text: This medication is Pregnancy Category D and is known to cause fetal risk.  It is also excreted in breast milk.
Isotretinoin Pregnancy And Lactation Text: This medication is Pregnancy Category X and is considered extremely dangerous during pregnancy. It is unknown if it is excreted in breast milk.
High Dose Vitamin A Counseling: Side effects reviewed, pt to contact office should one occur.
Odomzo Pregnancy And Lactation Text: This medication is Pregnancy Category X and is absolutely contraindicated during pregnancy. It is unknown if it is excreted in breast milk.
Zyclara Counseling:  I discussed with the patient the risks of imiquimod including but not limited to erythema, scaling, itching, weeping, crusting, and pain.  Patient understands that the inflammatory response to imiquimod is variable from person to person and was educated regarded proper titration schedule.  If flu-like symptoms develop, patient knows to discontinue the medication and contact us.
Imiquimod Counseling:  I discussed with the patient the risks of imiquimod including but not limited to erythema, scaling, itching, weeping, crusting, and pain.  Patient understands that the inflammatory response to imiquimod is variable from person to person and was educated regarded proper titration schedule.  If flu-like symptoms develop, patient knows to discontinue the medication and contact us.
Imiquimod Pregnancy And Lactation Text: This medication is Pregnancy Category C. It is unknown if this medication is excreted in breast milk.
Cephalexin Counseling: I counseled the patient regarding use of cephalexin as an antibiotic for prophylactic and/or therapeutic purposes. Cephalexin (commonly prescribed under brand name Keflex) is a cephalosporin antibiotic which is active against numerous classes of bacteria, including most skin bacteria. Side effects may include nausea, diarrhea, gastrointestinal upset, rash, hives, yeast infections, and in rare cases, hepatitis, kidney disease, seizures, fever, confusion, neurologic symptoms, and others. Patients with severe allergies to penicillin medications are cautioned that there is about a 10% incidence of cross-reactivity with cephalosporins. When possible, patients with penicillin allergies should use alternatives to cephalosporins for antibiotic therapy.
Siliq Pregnancy And Lactation Text: The risk during pregnancy and breastfeeding is uncertain with this medication.
Terbinafine Counseling: Patient counseling regarding adverse effects of terbinafine including but not limited to headache, diarrhea, rash, upset stomach, liver function test abnormalities, itching, taste/smell disturbance, nausea, abdominal pain, and flatulence.  There is a rare possibility of liver failure that can occur when taking terbinafine.  The patient understands that a baseline LFT and kidney function test may be required. The patient verbalized understanding of the proper use and possible adverse effects of terbinafine.  All of the patient's questions and concerns were addressed.
Cellcept Counseling:  I discussed with the patient the risks of mycophenolate mofetil including but not limited to infection/immunosuppression, GI upset, hypokalemia, hypercholesterolemia, bone marrow suppression, lymphoproliferative disorders, malignancy, GI ulceration/bleed/perforation, colitis, interstitial lung disease, kidney failure, progressive multifocal leukoencephalopathy, and birth defects.  The patient understands that monitoring is required including a baseline creatinine and regular CBC testing. In addition, patient must alert us immediately if symptoms of infection or other concerning signs are noted.
High Dose Vitamin A Pregnancy And Lactation Text: High dose vitamin A therapy is contraindicated during pregnancy and breast feeding.
Cimzia Counseling:  I discussed with the patient the risks of Cimzia including but not limited to immunosuppression, allergic reactions and infections.  The patient understands that monitoring is required including a PPD at baseline and must alert us or the primary physician if symptoms of infection or other concerning signs are noted.
Otezla Counseling: The side effects of Otezla were discussed with the patient, including but not limited to worsening or new depression, weight loss, diarrhea, nausea, upper respiratory tract infection, and headache. Patient instructed to call the office should any adverse effect occur.  The patient verbalized understanding of the proper use and possible adverse effects of Otezla.  All the patient's questions and concerns were addressed.
Terbinafine Pregnancy And Lactation Text: This medication is Pregnancy Category B and is considered safe during pregnancy. It is also excreted in breast milk and breast feeding isn't recommended.
Otezla Pregnancy And Lactation Text: This medication is Pregnancy Category C and it isn't known if it is safe during pregnancy. It is unknown if it is excreted in breast milk.
Minoxidil Counseling: Minoxidil is a topical medication which can increase blood flow where it is applied. It is uncertain how this medication increases hair growth. Side effects are uncommon and include stinging and allergic reactions.
Detail Level: Generalized
Simponi Counseling:  I discussed with the patient the risks of golimumab including but not limited to myelosuppression, immunosuppression, autoimmune hepatitis, demyelinating diseases, lymphoma, and serious infections.  The patient understands that monitoring is required including a PPD at baseline and must alert us or the primary physician if symptoms of infection or other concerning signs are noted.
Cephalexin Pregnancy And Lactation Text: This medication is Pregnancy Category B and considered safe during pregnancy.  It is also excreted in breast milk but can be used safely for shorter doses.
Cimzia Pregnancy And Lactation Text: This medication crosses the placenta but can be considered safe in certain situations. Cimzia may be excreted in breast milk.
Oxybutynin Counseling:  I discussed with the patient the risks of oxybutynin including but not limited to skin rash, drowsiness, dry mouth, difficulty urinating, and blurred vision.
Cyclophosphamide Counseling:  I discussed with the patient the risks of cyclophosphamide including but not limited to hair loss, hormonal abnormalities, decreased fertility, abdominal pain, diarrhea, nausea and vomiting, bone marrow suppression and infection. The patient understands that monitoring is required while taking this medication.
Clindamycin Counseling: I counseled the patient regarding use of clindamycin as an antibiotic for prophylactic and/or therapeutic purposes. Clindamycin is active against numerous classes of bacteria, including skin bacteria. Side effects may include nausea, diarrhea, gastrointestinal upset, rash, hives, yeast infections, and in rare cases, colitis.
Cimetidine Counseling:  I discussed with the patient the risks of Cimetidine including but not limited to gynecomastia, headache, diarrhea, nausea, drowsiness, arrhythmias, pancreatitis, skin rashes, psychosis, bone marrow suppression and kidney toxicity.
Benzoyl Peroxide Counseling: Patient counseled that medicine may cause skin irritation and bleach clothing.  In the event of skin irritation, the patient was advised to reduce the amount of the drug applied or use it less frequently.   The patient verbalized understanding of the proper use and possible adverse effects of benzoyl peroxide.  All of the patient's questions and concerns were addressed.
Picato Counseling:  I discussed with the patient the risks of Picato including but not limited to erythema, scaling, itching, weeping, crusting, and pain.
Rifampin Pregnancy And Lactation Text: This medication is Pregnancy Category C and it isn't know if it is safe during pregnancy. It is also excreted in breast milk and should not be used if you are breast feeding.
Clindamycin Pregnancy And Lactation Text: This medication can be used in pregnancy if certain situations. Clindamycin is also present in breast milk.
Arava Counseling:  Patient counseled regarding adverse effects of Arava including but not limited to nausea, vomiting, abnormalities in liver function tests. Patients may develop mouth sores, rash, diarrhea, and abnormalities in blood counts. The patient understands that monitoring is required including LFTs and blood counts.  There is a rare possibility of scarring of the liver and lung problems that can occur when taking methotrexate. Persistent nausea, loss of appetite, pale stools, dark urine, cough, and shortness of breath should be reported immediately. Patient advised to discontinue Arava treatment and consult with a physician prior to attempting conception. The patient will have to undergo a treatment to eliminate Arava from the body prior to conception.
Cosentyx Counseling:  I discussed with the patient the risks of Cosentyx including but not limited to worsening of Crohn's disease, immunosuppression, allergic reactions and infections.  The patient understands that monitoring is required including a PPD at baseline and must alert us or the primary physician if symptoms of infection or other concerning signs are noted.
Cyclophosphamide Pregnancy And Lactation Text: This medication is Pregnancy Category D and it isn't considered safe during pregnancy. This medication is excreted in breast milk.
Skyrizi Counseling: I discussed with the patient the risks of risankizumab-rzaa including but not limited to immunosuppression, and serious infections.  The patient understands that monitoring is required including a PPD at baseline and must alert us or the primary physician if symptoms of infection or other concerning signs are noted.
Cyclosporine Counseling:  I discussed with the patient the risks of cyclosporine including but not limited to hypertension, gingival hyperplasia,myelosuppression, immunosuppression, liver damage, kidney damage, neurotoxicity, lymphoma, and serious infections. The patient understands that monitoring is required including baseline blood pressure, CBC, CMP, lipid panel and uric acid, and then 1-2 times monthly CMP and blood pressure.
Dapsone Counseling: I discussed with the patient the risks of dapsone including but not limited to hemolytic anemia, agranulocytosis, rashes, methemoglobinemia, kidney failure, peripheral neuropathy, headaches, GI upset, and liver toxicity.  Patients who start dapsone require monitoring including baseline LFTs and weekly CBCs for the first month, then every month thereafter.  The patient verbalized understanding of the proper use and possible adverse effects of dapsone.  All of the patient's questions and concerns were addressed.
Sarecycline Counseling: Patient advised regarding possible photosensitivity and discoloration of the teeth, skin, lips, tongue and gums.  Patient instructed to avoid sunlight, if possible.  When exposed to sunlight, patients should wear protective clothing, sunglasses, and sunscreen.  The patient was instructed to call the office immediately if the following severe adverse effects occur:  hearing changes, easy bruising/bleeding, severe headache, or vision changes.  The patient verbalized understanding of the proper use and possible adverse effects of sarecycline.  All of the patient's questions and concerns were addressed.
Benzoyl Peroxide Pregnancy And Lactation Text: This medication is Pregnancy Category C. It is unknown if benzoyl peroxide is excreted in breast milk.
Carac Counseling:  I discussed with the patient the risks of Carac including but not limited to erythema, scaling, itching, weeping, crusting, and pain.
Birth Control Pills Counseling: Birth Control Pill Counseling: I discussed with the patient the potential side effects of OCPs including but not limited to increased risk of stroke, heart attack, thrombophlebitis, deep venous thrombosis, hepatic adenomas, breast changes, GI upset, headaches, and depression.  The patient verbalized understanding of the proper use and possible adverse effects of OCPs. All of the patient's questions and concerns were addressed.
Dupixent Counseling: I discussed with the patient the risks of dupilumab including but not limited to eye infection and irritation, cold sores, injection site reactions, worsening of asthma, allergic reactions and increased risk of parasitic infection.  Live vaccines should be avoided while taking dupilumab. Dupilumab will also interact with certain medications such as warfarin and cyclosporine. The patient understands that monitoring is required and they must alert us or the primary physician if symptoms of infection or other concerning signs are noted.
Doxycycline Counseling:  Patient counseled regarding possible photosensitivity and increased risk for sunburn.  Patient instructed to avoid sunlight, if possible.  When exposed to sunlight, patients should wear protective clothing, sunglasses, and sunscreen.  The patient was instructed to call the office immediately if the following severe adverse effects occur:  hearing changes, easy bruising/bleeding, severe headache, or vision changes.  The patient verbalized understanding of the proper use and possible adverse effects of doxycycline.  All of the patient's questions and concerns were addressed.
Clofazimine Counseling:  I discussed with the patient the risks of clofazimine including but not limited to skin and eye pigmentation, liver damage, nausea/vomiting, gastrointestinal bleeding and allergy.
Protopic Counseling: Patient may experience a mild burning sensation during topical application. Protopic is not approved in children less than 2 years of age. There have been case reports of hematologic and skin malignancies in patients using topical calcineurin inhibitors although causality is questionable.
Sarecycline Pregnancy And Lactation Text: This medication is Pregnancy Category D and not consider safe during pregnancy. It is also excreted in breast milk.
Doxycycline Pregnancy And Lactation Text: This medication is Pregnancy Category D and not consider safe during pregnancy. It is also excreted in breast milk but is considered safe for shorter treatment courses.
Stelara Counseling:  I discussed with the patient the risks of ustekinumab including but not limited to immunosuppression, malignancy, posterior leukoencephalopathy syndrome, and serious infections.  The patient understands that monitoring is required including a PPD at baseline and must alert us or the primary physician if symptoms of infection or other concerning signs are noted.
Dapsone Pregnancy And Lactation Text: This medication is Pregnancy Category C and is not considered safe during pregnancy or breast feeding.
Clofazimine Pregnancy And Lactation Text: This medication is Pregnancy Category C and isn't considered safe during pregnancy. It is excreted in breast milk.
Dupixent Pregnancy And Lactation Text: This medication likely crosses the placenta but the risk for the fetus is uncertain. This medication is excreted in breast milk.
Carac Pregnancy And Lactation Text: This medication is Pregnancy Category X and contraindicated in pregnancy and in women who may become pregnant. It is unknown if this medication is excreted in breast milk.
Cyclosporine Pregnancy And Lactation Text: This medication is Pregnancy Category C and it isn't know if it is safe during pregnancy. This medication is excreted in breast milk.
Doxepin Counseling:  Patient advised that the medication is sedating and not to drive a car after taking this medication. Patient informed of potential adverse effects including but not limited to dry mouth, urinary retention, and blurry vision.  The patient verbalized understanding of the proper use and possible adverse effects of doxepin.  All of the patient's questions and concerns were addressed.
Birth Control Pills Pregnancy And Lactation Text: This medication should be avoided if pregnant and for the first 30 days post-partum.
Tetracycline Counseling: Patient counseled regarding possible photosensitivity and increased risk for sunburn.  Patient instructed to avoid sunlight, if possible.  When exposed to sunlight, patients should wear protective clothing, sunglasses, and sunscreen.  The patient was instructed to call the office immediately if the following severe adverse effects occur:  hearing changes, easy bruising/bleeding, severe headache, or vision changes.  The patient verbalized understanding of the proper use and possible adverse effects of tetracycline.  All of the patient's questions and concerns were addressed. Patient understands to avoid pregnancy while on therapy due to potential birth defects.
Doxepin Pregnancy And Lactation Text: This medication is Pregnancy Category C and it isn't known if it is safe during pregnancy. It is also excreted in breast milk and breast feeding isn't recommended.
Spironolactone Counseling: Patient advised regarding risks of diarrhea, abdominal pain, hyperkalemia, birth defects (for female patients), liver toxicity and renal toxicity. The patient may need blood work to monitor liver and kidney function and potassium levels while on therapy. The patient verbalized understanding of the proper use and possible adverse effects of spironolactone.  All of the patient's questions and concerns were addressed.
Erivedge Counseling- I discussed with the patient the risks of Erivedge including but not limited to nausea, vomiting, diarrhea, constipation, weight loss, changes in the sense of taste, decreased appetite, muscle spasms, and hair loss.  The patient verbalized understanding of the proper use and possible adverse effects of Erivedge.  All of the patient's questions and concerns were addressed.
Protopic Pregnancy And Lactation Text: This medication is Pregnancy Category C. It is unknown if this medication is excreted in breast milk when applied topically.
Taltz Counseling: I discussed with the patient the risks of ixekizumab including but not limited to immunosuppression, serious infections, worsening of inflammatory bowel disease and drug reactions.  The patient understands that monitoring is required including a PPD at baseline and must alert us or the primary physician if symptoms of infection or other concerning signs are noted.
5-Fu Counseling: 5-Fluorouracil Counseling:  I discussed with the patient the risks of 5-fluorouracil including but not limited to erythema, scaling, itching, weeping, crusting, and pain.
Enbrel Counseling:  I discussed with the patient the risks of etanercept including but not limited to myelosuppression, immunosuppression, autoimmune hepatitis, demyelinating diseases, lymphoma, and infections.  The patient understands that monitoring is required including a PPD at baseline and must alert us or the primary physician if symptoms of infection or other concerning signs are noted.
Colchicine Counseling:  Patient counseled regarding adverse effects including but not limited to stomach upset (nausea, vomiting, stomach pain, or diarrhea).  Patient instructed to limit alcohol consumption while taking this medication.  Colchicine may reduce blood counts especially with prolonged use.  The patient understands that monitoring of kidney function and blood counts may be required, especially at baseline. The patient verbalized understanding of the proper use and possible adverse effects of colchicine.  All of the patient's questions and concerns were addressed.
Methotrexate Counseling:  Patient counseled regarding adverse effects of methotrexate including but not limited to nausea, vomiting, abnormalities in liver function tests. Patients may develop mouth sores, rash, diarrhea, and abnormalities in blood counts. The patient understands that monitoring is required including LFT's and blood counts.  There is a rare possibility of scarring of the liver and lung problems that can occur when taking methotrexate. Persistent nausea, loss of appetite, pale stools, dark urine, cough, and shortness of breath should be reported immediately. Patient advised to discontinue methotrexate treatment at least three months before attempting to become pregnant.  I discussed the need for folate supplements while taking methotrexate.  These supplements can decrease side effects during methotrexate treatment. The patient verbalized understanding of the proper use and possible adverse effects of methotrexate.  All of the patient's questions and concerns were addressed.
Erythromycin Counseling:  I discussed with the patient the risks of erythromycin including but not limited to GI upset, allergic reaction, drug rash, diarrhea, increase in liver enzymes, and yeast infections.
Spironolactone Pregnancy And Lactation Text: This medication can cause feminization of the male fetus and should be avoided during pregnancy. The active metabolite is also found in breast milk.
Erythromycin Pregnancy And Lactation Text: This medication is Pregnancy Category B and is considered safe during pregnancy. It is also excreted in breast milk.
Methotrexate Pregnancy And Lactation Text: This medication is Pregnancy Category X and is known to cause fetal harm. This medication is excreted in breast milk.
Hydroxyzine Counseling: Patient advised that the medication is sedating and not to drive a car after taking this medication.  Patient informed of potential adverse effects including but not limited to dry mouth, urinary retention, and blurry vision.  The patient verbalized understanding of the proper use and possible adverse effects of hydroxyzine.  All of the patient's questions and concerns were addressed.
Solaraze Counseling:  I discussed with the patient the risks of Solaraze including but not limited to erythema, scaling, itching, weeping, crusting, and pain.
Solaraze Pregnancy And Lactation Text: This medication is Pregnancy Category B and is considered safe. There is some data to suggest avoiding during the third trimester. It is unknown if this medication is excreted in breast milk.
Hydroxyzine Pregnancy And Lactation Text: This medication is not safe during pregnancy and should not be taken. It is also excreted in breast milk and breast feeding isn't recommended.
Metronidazole Counseling:  I discussed with the patient the risks of metronidazole including but not limited to seizures, nausea/vomiting, a metallic taste in the mouth, nausea/vomiting and severe allergy.
Humira Counseling:  I discussed with the patient the risks of adalimumab including but not limited to myelosuppression, immunosuppression, autoimmune hepatitis, demyelinating diseases, lymphoma, and serious infections.  The patient understands that monitoring is required including a PPD at baseline and must alert us or the primary physician if symptoms of infection or other concerning signs are noted.
Prednisone Counseling:  I discussed with the patient the risks of prolonged use of prednisone including but not limited to weight gain, insomnia, osteoporosis, mood changes, diabetes, susceptibility to infection, glaucoma and high blood pressure.  In cases where prednisone use is prolonged, patients should be monitored with blood pressure checks, serum glucose levels and an eye exam.  Additionally, the patient may need to be placed on GI prophylaxis, PCP prophylaxis, and calcium and vitamin D supplementation and/or a bisphosphonate.  The patient verbalized understanding of the proper use and the possible adverse effects of prednisone.  All of the patient's questions and concerns were addressed.
Gabapentin Counseling: I discussed with the patient the risks of gabapentin including but not limited to dizziness, somnolence, fatigue and ataxia.
Tremfya Counseling: I discussed with the patient the risks of guselkumab including but not limited to immunosuppression, serious infections, worsening of inflammatory bowel disease and drug reactions.  The patient understands that monitoring is required including a PPD at baseline and must alert us or the primary physician if symptoms of infection or other concerning signs are noted.
SSKI Counseling:  I discussed with the patient the risks of SSKI including but not limited to thyroid abnormalities, metallic taste, GI upset, fever, headache, acne, arthralgias, paraesthesias, lymphadenopathy, easy bleeding, arrhythmias, and allergic reaction.
Drysol Counseling:  I discussed with the patient the risks of drysol/aluminum chloride including but not limited to skin rash, itching, irritation, burning.
Drysol Pregnancy And Lactation Text: This medication is considered safe during pregnancy and breast feeding.
Sski Pregnancy And Lactation Text: This medication is Pregnancy Category D and isn't considered safe during pregnancy. It is excreted in breast milk.
Topical Retinoid counseling:  Patient advised to apply a pea-sized amount only at bedtime and wait 30 minutes after washing their face before applying.  If too drying, patient may add a non-comedogenic moisturizer. The patient verbalized understanding of the proper use and possible adverse effects of retinoids.  All of the patient's questions and concerns were addressed.
Fluconazole Counseling:  Patient counseled regarding adverse effects of fluconazole including but not limited to headache, diarrhea, nausea, upset stomach, liver function test abnormalities, taste disturbance, and stomach pain.  There is a rare possibility of liver failure that can occur when taking fluconazole.  The patient understands that monitoring of LFTs and kidney function test may be required, especially at baseline. The patient verbalized understanding of the proper use and possible adverse effects of fluconazole.  All of the patient's questions and concerns were addressed.
Metronidazole Pregnancy And Lactation Text: This medication is Pregnancy Category B and considered safe during pregnancy.  It is also excreted in breast milk.
Glycopyrrolate Counseling:  I discussed with the patient the risks of glycopyrrolate including but not limited to skin rash, drowsiness, dry mouth, difficulty urinating, and blurred vision.
Minocycline Counseling: Patient advised regarding possible photosensitivity and discoloration of the teeth, skin, lips, tongue and gums.  Patient instructed to avoid sunlight, if possible.  When exposed to sunlight, patients should wear protective clothing, sunglasses, and sunscreen.  The patient was instructed to call the office immediately if the following severe adverse effects occur:  hearing changes, easy bruising/bleeding, severe headache, or vision changes.  The patient verbalized understanding of the proper use and possible adverse effects of minocycline.  All of the patient's questions and concerns were addressed.
Elidel Counseling: Patient may experience a mild burning sensation during topical application. Elidel is not approved in children less than 2 years of age. There have been case reports of hematologic and skin malignancies in patients using topical calcineurin inhibitors although causality is questionable.
Albendazole Counseling:  I discussed with the patient the risks of albendazole including but not limited to cytopenia, kidney damage, nausea/vomiting and severe allergy.  The patient understands that this medication is being used in an off-label manner.
Thalidomide Counseling: I discussed with the patient the risks of thalidomide including but not limited to birth defects, anxiety, weakness, chest pain, dizziness, cough and severe allergy.
Tazorac Counseling:  Patient advised that medication is irritating and drying.  Patient may need to apply sparingly and wash off after an hour before eventually leaving it on overnight.  The patient verbalized understanding of the proper use and possible adverse effects of tazorac.  All of the patient's questions and concerns were addressed.
Glycopyrrolate Pregnancy And Lactation Text: This medication is Pregnancy Category B and is considered safe during pregnancy. It is unknown if it is excreted breast milk.
Ilumya Counseling: I discussed with the patient the risks of tildrakizumab including but not limited to immunosuppression, malignancy, posterior leukoencephalopathy syndrome, and serious infections.  The patient understands that monitoring is required including a PPD at baseline and must alert us or the primary physician if symptoms of infection or other concerning signs are noted.
Xeljanz Counseling: I discussed with the patient the risks of Xeljanz therapy including increased risk of infection, liver issues, headache, diarrhea, or cold symptoms. Live vaccines should be avoided. They were instructed to call if they have any problems.
Xelncikyz Pregnancy And Lactation Text: This medication is Pregnancy Category D and is not considered safe during pregnancy.  The risk during breast feeding is also uncertain.
Acitretin Counseling:  I discussed with the patient the risks of acitretin including but not limited to hair loss, dry lips/skin/eyes, liver damage, hyperlipidemia, depression/suicidal ideation, photosensitivity.  Serious rare side effects can include but are not limited to pancreatitis, pseudotumor cerebri, bony changes, clot formation/stroke/heart attack.  Patient understands that alcohol is contraindicated since it can result in liver toxicity and significantly prolong the elimination of the drug by many years.
Griseofulvin Counseling:  I discussed with the patient the risks of griseofulvin including but not limited to photosensitivity, cytopenia, liver damage, nausea/vomiting and severe allergy.  The patient understands that this medication is best absorbed when taken with a fatty meal (e.g., ice cream or french fries).
Hydroxychloroquine Counseling:  I discussed with the patient that a baseline ophthalmologic exam is needed at the start of therapy and every year thereafter while on therapy. A CBC may also be warranted for monitoring.  The side effects of this medication were discussed with the patient, including but not limited to agranulocytosis, aplastic anemia, seizures, rashes, retinopathy, and liver toxicity. Patient instructed to call the office should any adverse effect occur.  The patient verbalized understanding of the proper use and possible adverse effects of Plaquenil.  All the patient's questions and concerns were addressed.
Acitretin Pregnancy And Lactation Text: This medication is Pregnancy Category X and should not be given to women who are pregnant or may become pregnant in the future. This medication is excreted in breast milk.
Valtrex Counseling: I discussed with the patient the risks of valacyclovir including but not limited to kidney damage, nausea, vomiting and severe allergy.  The patient understands that if the infection seems to be worsening or is not improving, they are to call.
Ivermectin Counseling:  Patient instructed to take medication on an empty stomach with a full glass of water.  Patient informed of potential adverse effects including but not limited to nausea, diarrhea, dizziness, itching, and swelling of the extremities or lymph nodes.  The patient verbalized understanding of the proper use and possible adverse effects of ivermectin.  All of the patient's questions and concerns were addressed.
Tazorac Pregnancy And Lactation Text: This medication is not safe during pregnancy. It is unknown if this medication is excreted in breast milk.
Topical Clindamycin Counseling: Patient counseled that this medication may cause skin irritation or allergic reactions.  In the event of skin irritation, the patient was advised to reduce the amount of the drug applied or use it less frequently.   The patient verbalized understanding of the proper use and possible adverse effects of clindamycin.  All of the patient's questions and concerns were addressed.
Eucrisa Counseling: Patient may experience a mild burning sensation during topical application. Eucrisa is not approved in children less than 2 years of age.
Quinolones Counseling:  I discussed with the patient the risks of fluoroquinolones including but not limited to GI upset, allergic reaction, drug rash, diarrhea, dizziness, photosensitivity, yeast infections, liver function test abnormalities, tendonitis/tendon rupture.
Infliximab Counseling:  I discussed with the patient the risks of infliximab including but not limited to myelosuppression, immunosuppression, autoimmune hepatitis, demyelinating diseases, lymphoma, and serious infections.  The patient understands that monitoring is required including a PPD at baseline and must alert us or the primary physician if symptoms of infection or other concerning signs are noted.
Griseofulvin Pregnancy And Lactation Text: This medication is Pregnancy Category X and is known to cause serious birth defects. It is unknown if this medication is excreted in breast milk but breast feeding should be avoided.

## 2020-11-03 ENCOUNTER — APPOINTMENT (RX ONLY)
Dept: URBAN - METROPOLITAN AREA CLINIC 125 | Facility: CLINIC | Age: 55
Setting detail: DERMATOLOGY
End: 2020-11-03

## 2020-11-03 DIAGNOSIS — L259 CONTACT DERMATITIS AND OTHER ECZEMA, UNSPECIFIED CAUSE: ICD-10-CM

## 2020-11-03 DIAGNOSIS — L20.89 OTHER ATOPIC DERMATITIS: ICD-10-CM | Status: IMPROVED

## 2020-11-03 DIAGNOSIS — M25.50 PAIN IN UNSPECIFIED JOINT: ICD-10-CM

## 2020-11-03 DIAGNOSIS — B00.1 HERPESVIRAL VESICULAR DERMATITIS: ICD-10-CM

## 2020-11-03 DIAGNOSIS — Z79.899 OTHER LONG TERM (CURRENT) DRUG THERAPY: ICD-10-CM

## 2020-11-03 PROBLEM — L23.9 ALLERGIC CONTACT DERMATITIS, UNSPECIFIED CAUSE: Status: ACTIVE | Noted: 2020-11-03

## 2020-11-03 PROCEDURE — ? TREATMENT REGIMEN

## 2020-11-03 PROCEDURE — ? CHRONOLOGY OF PRESENT ILLNESS

## 2020-11-03 PROCEDURE — ? DUPIXENT MONITORING

## 2020-11-03 PROCEDURE — ? HIGH RISK MEDICATION MONITORING

## 2020-11-03 PROCEDURE — ? PRESCRIPTION

## 2020-11-03 PROCEDURE — ? ORDER TESTS

## 2020-11-03 PROCEDURE — ? OTHER

## 2020-11-03 PROCEDURE — 99214 OFFICE O/P EST MOD 30 MIN: CPT

## 2020-11-03 PROCEDURE — ? COUNSELING

## 2020-11-03 RX ORDER — EMOLLIENT COMBINATION NO.35
CREAM (GRAM) TOPICAL
Qty: 1 | Refills: 3 | Status: ERX | COMMUNITY
Start: 2020-11-03

## 2020-11-03 RX ADMIN — Medication: at 00:00

## 2020-11-03 ASSESSMENT — LOCATION DETAILED DESCRIPTION DERM
LOCATION DETAILED: LEFT INFERIOR VERMILION LIP
LOCATION DETAILED: LEFT AXILLARY VAULT
LOCATION DETAILED: RIGHT CAVUM CONCHA
LOCATION DETAILED: LEFT INFERIOR CRUS OF ANTIHELIX
LOCATION DETAILED: RIGHT POSTERIOR SHOULDER
LOCATION DETAILED: LEFT MEDIAL FRONTAL SCALP
LOCATION DETAILED: RIGHT ELBOW
LOCATION DETAILED: RIGHT CYMBA CONCHA
LOCATION DETAILED: LEFT CAVUM CONCHA
LOCATION DETAILED: LEFT ELBOW

## 2020-11-03 ASSESSMENT — LOCATION ZONE DERM
LOCATION ZONE: ARM
LOCATION ZONE: EAR
LOCATION ZONE: SCALP
LOCATION ZONE: LIP
LOCATION ZONE: AXILLAE

## 2020-11-03 ASSESSMENT — SEVERITY ASSESSMENT 2020: SEVERITY 2020: MILD

## 2020-11-03 ASSESSMENT — LOCATION SIMPLE DESCRIPTION DERM
LOCATION SIMPLE: LEFT LIP
LOCATION SIMPLE: LEFT AXILLARY VAULT
LOCATION SIMPLE: LEFT SCALP
LOCATION SIMPLE: LEFT ELBOW
LOCATION SIMPLE: RIGHT SHOULDER
LOCATION SIMPLE: LEFT EAR
LOCATION SIMPLE: RIGHT EAR
LOCATION SIMPLE: RIGHT ELBOW

## 2020-11-03 NOTE — PROCEDURE: TREATMENT REGIMEN
Plan: Consider ILK to ears at follow up if not improved.\\nIGA score 1
Continue Regimen: Systane eye drops QD \\nDupixent 300mg SC QOWK\\n\\n\\nOlux 0.05% foam BID PRN flares\\nKetoconazole 2% shampoo BIW-TIW\\nKetoconazole 2% cream BID to ears. \\nAllegra QAM (continue), Claritin QHS (continue)\\nProtopic ointment 0.1% QD - BID to ears
Detail Level: Simple
Plan: Recommend eval with Dr. Dai
Initiate Treatment: Mimyx cream as barrier
Plan: Recommend call the  of her hearing aids to obtain a list of ingredients to possibly do patch testing in the future.
Continue Regimen: Protopic ointment BID ears
Continue Regimen: Valtrex 1g 2 PO QD at first sign, then repeat 12 hours later
Plan: Consider switching to daily dosing if not improving

## 2020-11-03 NOTE — PROCEDURE: ORDER TESTS
Bill For Surgical Tray: no
Billing Type: Third-Party Bill
Expected Date Of Service: 11/03/2020
Performing Laboratory: [id number]

## 2020-11-03 NOTE — PROCEDURE: CHRONOLOGY OF PRESENT ILLNESS
Chronology Of Present Illness: 8/8/2019:  Rash, located on the right ear and scalp. The rash is scaly, red, and itchy. The rash has been present for years. She has arthritis. She is not currently on any medications. She was started on Olux foam and ketoconazole cream.\\n11/17/2019: scalp is improved, ears are not. 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 She was started on hydrocortisone butuyrate and ketoconazole for the underarms. Biopsy obtained (eczematous dermatitis)\\n1/3/2020: Axillae have improved. scalp and ears as well. Continue current topicals and Pramosone lotion was added for the underarms.\\n2/5/2020: drastic improvement of axillae. She discontinued Hydrocortisone cream and is only using ketoconazole cream. scalp and ears are stable. Start Elidel cream to ears. \\n3/25/2020: scalp and ears not improved. Discussed Dupixent with patient. Consider after Cn19. Continue current topicals.\\n5/27/2020: scalp still flaring. Punch bx obtained of neck. (folliculitis, but not c/w clinical symptoms and presentation. Favor eczematous derm/AD. Continue current topicals. \\n8/4/2020: rash not well controlled on topicals. Start Dupixent enrollment. IGA score 4. Full biologic labs obtained. RX Valtrex given.\\n9/1/2020: Dupixent start today. Recommend Systane eyedrops and Valtrex PRN as needed. \\n10/6/2020: Patient is somewhat improved, overall itching is better. Her ears are still very itchy. She is using Olux foam and ketoconazole cream to ears. She complains of new onset of joint pain above elbows and shoulders. She will see Dr. Dai and we will send letter.\\n11/3/2020: patient is improved overall. She still complains of itchy ears. Recommend talking to her audiologist to see if there are other hearing aids that are made of different material. RX Mimyx given for barrier cream. Continue Protopic 0.1% ointment QD PRN to ears and other topicals. Continue Dupixent injections. IGA score 1
Detail Level: Zone

## 2020-11-03 NOTE — PROCEDURE: OTHER
Note Text (......Xxx Chief Complaint.): This diagnosis correlates with the
Detail Level: Simple
Other (Free Text): Patient will call with labs that Dr. Muro wants to have drawn. We will fax order to [facility] [place] once she calls us

## 2020-12-09 ENCOUNTER — APPOINTMENT (RX ONLY)
Dept: URBAN - METROPOLITAN AREA CLINIC 125 | Facility: CLINIC | Age: 55
Setting detail: DERMATOLOGY
End: 2020-12-09

## 2020-12-09 DIAGNOSIS — B00.1 HERPESVIRAL VESICULAR DERMATITIS: ICD-10-CM

## 2020-12-09 DIAGNOSIS — L20.89 OTHER ATOPIC DERMATITIS: ICD-10-CM | Status: IMPROVED

## 2020-12-09 DIAGNOSIS — L259 CONTACT DERMATITIS AND OTHER ECZEMA, UNSPECIFIED CAUSE: ICD-10-CM | Status: STABLE

## 2020-12-09 DIAGNOSIS — Z79.899 OTHER LONG TERM (CURRENT) DRUG THERAPY: ICD-10-CM

## 2020-12-09 DIAGNOSIS — M25.50 PAIN IN UNSPECIFIED JOINT: ICD-10-CM | Status: STABLE

## 2020-12-09 PROBLEM — L23.9 ALLERGIC CONTACT DERMATITIS, UNSPECIFIED CAUSE: Status: ACTIVE | Noted: 2020-12-09

## 2020-12-09 PROCEDURE — ? TREATMENT REGIMEN

## 2020-12-09 PROCEDURE — ? HIGH RISK MEDICATION MONITORING

## 2020-12-09 PROCEDURE — ? CHRONOLOGY OF PRESENT ILLNESS

## 2020-12-09 PROCEDURE — ? DUPIXENT MONITORING

## 2020-12-09 PROCEDURE — ? ORDER TESTS

## 2020-12-09 PROCEDURE — ? PRESCRIPTION

## 2020-12-09 PROCEDURE — ? COUNSELING

## 2020-12-09 PROCEDURE — 99214 OFFICE O/P EST MOD 30 MIN: CPT

## 2020-12-09 RX ORDER — DOXEPIN HYDROCHLORIDE 50 MG/G
CREAM TOPICAL
Qty: 1 | Refills: 0 | Status: ERX | COMMUNITY
Start: 2020-12-09

## 2020-12-09 RX ADMIN — DOXEPIN HYDROCHLORIDE: 50 CREAM TOPICAL at 00:00

## 2020-12-09 ASSESSMENT — LOCATION DETAILED DESCRIPTION DERM
LOCATION DETAILED: RIGHT CYMBA CONCHA
LOCATION DETAILED: LEFT INFERIOR VERMILION LIP
LOCATION DETAILED: LEFT INFERIOR CRUS OF ANTIHELIX
LOCATION DETAILED: RIGHT ELBOW
LOCATION DETAILED: LEFT AXILLARY VAULT
LOCATION DETAILED: RIGHT CAVUM CONCHA
LOCATION DETAILED: LEFT ELBOW
LOCATION DETAILED: RIGHT POSTERIOR SHOULDER
LOCATION DETAILED: LEFT CAVUM CONCHA
LOCATION DETAILED: LEFT MEDIAL FRONTAL SCALP

## 2020-12-09 ASSESSMENT — LOCATION SIMPLE DESCRIPTION DERM
LOCATION SIMPLE: RIGHT ELBOW
LOCATION SIMPLE: LEFT ELBOW
LOCATION SIMPLE: LEFT AXILLARY VAULT
LOCATION SIMPLE: RIGHT EAR
LOCATION SIMPLE: LEFT LIP
LOCATION SIMPLE: LEFT EAR
LOCATION SIMPLE: RIGHT SHOULDER
LOCATION SIMPLE: LEFT SCALP

## 2020-12-09 ASSESSMENT — LOCATION ZONE DERM
LOCATION ZONE: AXILLAE
LOCATION ZONE: SCALP
LOCATION ZONE: LIP
LOCATION ZONE: ARM
LOCATION ZONE: EAR

## 2020-12-09 ASSESSMENT — SEVERITY ASSESSMENT 2020: SEVERITY 2020: MILD

## 2020-12-09 NOTE — PROCEDURE: ORDER TESTS
Bill For Surgical Tray: no
Billing Type: Third-Party Bill
Clinical Notes (To The Lab): Patient wanted Lipid, TSH and HgB A1c added for her PCP. We will fax to  him once received
Lab Facility: [id number]
Expected Date Of Service: 12/09/2020
Performing Laboratory: [id number]

## 2020-12-09 NOTE — PROCEDURE: CHRONOLOGY OF PRESENT ILLNESS
Chronology Of Present Illness: 8/8/2019:  Rash, located on the right ear and scalp. The rash is scaly, red, and itchy. The rash has been present for years. She has arthritis. She is not currently on any medications. She was started on Olux foam and ketoconazole cream.\\n11/17/2019: scalp is improved, ears are not. 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 She was started on hydrocortisone butuyrate and ketoconazole for the underarms. Biopsy obtained (eczematous dermatitis)\\n1/3/2020: Axillae have improved. scalp and ears as well. Continue current topicals and Pramosone lotion was added for the underarms.\\n2/5/2020: drastic improvement of axillae. She discontinued Hydrocortisone cream and is only using ketoconazole cream. scalp and ears are stable. Start Elidel cream to ears. \\n3/25/2020: scalp and ears not improved. Discussed Dupixent with patient. Consider after Cn19. Continue current topicals.\\n5/27/2020: scalp still flaring. Punch bx obtained of neck. (folliculitis, but not c/w clinical symptoms and presentation. Favor eczematous derm/AD. Continue current topicals. \\n8/4/2020: rash not well controlled on topicals. Start Dupixent enrollment. IGA score 4. Full biologic labs obtained. RX Valtrex given.\\n9/1/2020: Dupixent start today. Recommend Systane eyedrops and Valtrex PRN as needed. \\n10/6/2020: Patient is somewhat improved, overall itching is better. Her ears are still very itchy. She is using Olux foam and ketoconazole cream to ears. She complains of new onset of joint pain above elbows and shoulders. She will see Dr. Dai and we will send letter.\\n11/3/2020: patient is improved overall. She still complains of itchy ears. Recommend talking to her audiologist to see if there are other hearing aids that are made of different material. RX Mimyx given for barrier cream. Continue Protopic 0.1% ointment QD PRN to ears and other topicals. Continue Dupixent injections. IGA score 1\\n12/9/2020: patient is much better. Ears are better. Hearing aid manufacturers told her that hearing aids are made out of medical grade hypoallergenic silicone. Itching has improved. Continue current topicals, antihistamines and Dupixent. Start Zonalon cream to ears for itching. Lab order given to patient today. Discussed that injection site reactions should subside.
Detail Level: Zone

## 2020-12-09 NOTE — PROCEDURE: MIPS QUALITY
Quality 431: Preventive Care And Screening: Unhealthy Alcohol Use - Screening: Patient screened for unhealthy alcohol use using a single question and scores less than 2 times per year
Quality 110: Preventive Care And Screening: Influenza Immunization: Influenza Immunization not Administered due to Patient Allergy.
Detail Level: Detailed
Quality 130: Documentation Of Current Medications In The Medical Record: Current Medications Documented
Quality 226: Preventive Care And Screening: Tobacco Use: Screening And Cessation Intervention: Patient screened for tobacco use and is an ex/non-smoker

## 2020-12-09 NOTE — PROCEDURE: DUPIXENT MONITORING
Initial Cmp (Optional): 8/4/2020
Detail Level: Zone
Length Of Therapy: 3 months
Add High Risk Medication Management Associated Diagnosis?: No
Comments: Start 9/1/2020
Patient Reported Weight(Optional But Include Units): 215

## 2020-12-09 NOTE — PROCEDURE: TREATMENT REGIMEN
Plan: IGA score 1
Initiate Treatment: Zonalon cream to ears for itching (warned of increased somnolence)
Continue Regimen: Systane eye drops QD \\nDupixent 300mg SC QOWK\\nOlux 0.05% foam BID PRN flares\\nKetoconazole 2% shampoo BIW-TIW\\nKetoconazole 2% cream BID to ears. \\nAllegra QAM (continue), Claritin QHS (continue)\\nProtopic ointment 0.1% QD - BID to ears\\nMimyx BID to ears
Detail Level: Simple
Initiate Treatment: Zonalon cream NID (warned of increased somnolence with its usage)
Continue Regimen: Protopic ointment BID ears\\nMimyx cream as barrier
Continue Regimen: Valtrex 1g 2 PO QD at first sign, then repeat 12 hours later - use PRN flares

## 2021-03-17 ENCOUNTER — APPOINTMENT (RX ONLY)
Dept: URBAN - METROPOLITAN AREA CLINIC 125 | Facility: CLINIC | Age: 56
Setting detail: DERMATOLOGY
End: 2021-03-17

## 2021-03-17 DIAGNOSIS — Z79.899 OTHER LONG TERM (CURRENT) DRUG THERAPY: ICD-10-CM

## 2021-03-17 DIAGNOSIS — L71.8 OTHER ROSACEA: ICD-10-CM | Status: INADEQUATELY CONTROLLED

## 2021-03-17 DIAGNOSIS — L259 CONTACT DERMATITIS AND OTHER ECZEMA, UNSPECIFIED CAUSE: ICD-10-CM | Status: INADEQUATELY CONTROLLED

## 2021-03-17 DIAGNOSIS — B00.1 HERPESVIRAL VESICULAR DERMATITIS: ICD-10-CM

## 2021-03-17 DIAGNOSIS — L20.89 OTHER ATOPIC DERMATITIS: ICD-10-CM | Status: INADEQUATELY CONTROLLED

## 2021-03-17 PROBLEM — L23.9 ALLERGIC CONTACT DERMATITIS, UNSPECIFIED CAUSE: Status: ACTIVE | Noted: 2021-03-17

## 2021-03-17 PROCEDURE — ? CHRONOLOGY OF PRESENT ILLNESS

## 2021-03-17 PROCEDURE — ? PRESCRIPTION MEDICATION MANAGEMENT

## 2021-03-17 PROCEDURE — ? ORDER TESTS

## 2021-03-17 PROCEDURE — ? HIGH RISK MEDICATION MONITORING

## 2021-03-17 PROCEDURE — ? DUPIXENT MONITORING

## 2021-03-17 PROCEDURE — 99214 OFFICE O/P EST MOD 30 MIN: CPT

## 2021-03-17 PROCEDURE — ? TREATMENT REGIMEN

## 2021-03-17 PROCEDURE — ? COUNSELING

## 2021-03-17 PROCEDURE — ? PRESCRIPTION

## 2021-03-17 RX ORDER — DOXYCYCLINE 40 MG/1
CAPSULE ORAL
Qty: 30 | Refills: 3 | Status: ERX | COMMUNITY
Start: 2021-03-17

## 2021-03-17 RX ADMIN — DOXYCYCLINE: 40 CAPSULE ORAL at 00:00

## 2021-03-17 ASSESSMENT — LOCATION DETAILED DESCRIPTION DERM
LOCATION DETAILED: LEFT INFERIOR VERMILION LIP
LOCATION DETAILED: LEFT CAVUM CONCHA
LOCATION DETAILED: LEFT AXILLARY VAULT
LOCATION DETAILED: LEFT MEDIAL FRONTAL SCALP
LOCATION DETAILED: LEFT PUPIL
LOCATION DETAILED: LEFT INFERIOR CRUS OF ANTIHELIX
LOCATION DETAILED: RIGHT CYMBA CONCHA
LOCATION DETAILED: RIGHT CAVUM CONCHA
LOCATION DETAILED: RIGHT PUPIL

## 2021-03-17 ASSESSMENT — SEVERITY ASSESSMENT 2020: SEVERITY 2020: MILD

## 2021-03-17 ASSESSMENT — LOCATION SIMPLE DESCRIPTION DERM
LOCATION SIMPLE: LEFT SCALP
LOCATION SIMPLE: LEFT AXILLARY VAULT
LOCATION SIMPLE: LEFT EAR
LOCATION SIMPLE: LEFT LIP
LOCATION SIMPLE: LEFT EYE
LOCATION SIMPLE: RIGHT EYE
LOCATION SIMPLE: RIGHT EAR

## 2021-03-17 ASSESSMENT — LOCATION ZONE DERM
LOCATION ZONE: EAR
LOCATION ZONE: AXILLAE
LOCATION ZONE: SCALP
LOCATION ZONE: LIP
LOCATION ZONE: CORNEA

## 2021-03-17 ASSESSMENT — SEVERITY ASSESSMENT: SEVERITY: MODERATE

## 2021-03-17 ASSESSMENT — BSA ECZEMA: % BODY COVERED IN ECZEMA: 1

## 2021-03-17 NOTE — PROCEDURE: CHRONOLOGY OF PRESENT ILLNESS
Chronology Of Present Illness: 8/8/2019:  Rash, located on the right ear and scalp. The rash is scaly, red, and itchy. The rash has been present for years. She has arthritis. She is not currently on any medications. She was started on Olux foam and ketoconazole cream.\\n11/17/2019: scalp is improved, ears are not. 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 She was started on hydrocortisone butuyrate and ketoconazole for the underarms. Biopsy obtained (eczematous dermatitis)\\n1/3/2020: Axillae have improved. scalp and ears as well. Continue current topicals and Pramosone lotion was added for the underarms.\\n2/5/2020: drastic improvement of axillae. She discontinued Hydrocortisone cream and is only using ketoconazole cream. scalp and ears are stable. Start Elidel cream to ears. \\n3/25/2020: scalp and ears not improved. Discussed Dupixent with patient. Consider after Cn19. Continue current topicals.\\n5/27/2020: scalp still flaring. Punch bx obtained of neck. (folliculitis, but not c/w clinical symptoms and presentation. Favor eczematous derm/AD. Continue current topicals. \\n8/4/2020: rash not well controlled on topicals. Start Dupixent enrollment. IGA score 4. Full biologic labs obtained. RX Valtrex given.\\n9/1/2020: Dupixent start today. Recommend Systane eyedrops and Valtrex PRN as needed. \\n10/6/2020: Patient is somewhat improved, overall itching is better. Her ears are still very itchy. She is using Olux foam and ketoconazole cream to ears. She complains of new onset of joint pain above elbows and shoulders. She will see Dr. Dai and we will send letter.\\n11/3/2020: patient is improved overall. She still complains of itchy ears. Recommend talking to her audiologist to see if there are other hearing aids that are made of different material. RX Mimyx given for barrier cream. Continue Protopic 0.1% ointment QD PRN to ears and other topicals. Continue Dupixent injections. IGA score 1\\n12/9/2020: patient is much better. Ears are better. Hearing aid manufacturers told her that hearing aids are made out of medical grade hypoallergenic silicone. Itching has improved. Continue current topicals, antihistamines and Dupixent. Start Zonalon cream to ears for itching. Lab order given to patient today. Discussed that injection site reactions should subside.\\n3/17/21: eyes are very bothersome. Discussed possible component of rosacea. Rec warm rice sock compresses in addition to the Systane eye drops. For now continue Dupixent and topicals.
Detail Level: Zone

## 2021-03-17 NOTE — PROCEDURE: DUPIXENT MONITORING
Initial Cmp (Optional): 8/4/2020
Detail Level: Zone
Length Of Therapy: 6 months
Latest Cbc (Optional): 12/10/2020
Add High Risk Medication Management Associated Diagnosis?: No
Comments: Start 9/1/2020
Patient Reported Weight(Optional But Include Units): 215

## 2021-03-17 NOTE — PROCEDURE: PRESCRIPTION MEDICATION MANAGEMENT
Continue Regimen: Protopic ointment BID ears\\nMimyx cream as barrier\\nZonalon cream NID (warned of increased somnolence with its usage)
Render In Strict Bullet Format?: No
Plan: Offered ILK, patient defers at this point. Consider in the future with prior topical numbing
Detail Level: Zone
Initiate Treatment: Oracea 40 mg capsule,immediate - delay release \\nDays Supply: 30\\nSig: Take 1 PO QD with dinner

## 2021-03-17 NOTE — PROCEDURE: ORDER TESTS
Bill For Surgical Tray: no
Billing Type: Third-Party Bill
Clinical Notes (To The Lab): Standing order given today
Lab Facility: [id number]
Expected Date Of Service: 03/17/2021
Performing Laboratory: [id number]

## 2021-03-17 NOTE — PROCEDURE: TREATMENT REGIMEN
Plan: IGA score 1
Initiate Treatment: Zonalon cream to ears for itching (warned of increased somnolence)
Continue Regimen: Systane eye drops QD \\nDupixent 300mg SC QOWK\\nOlux 0.05% foam BID PRN flares\\nKetoconazole 2% shampoo BIW-TIW\\nKetoconazole 2% cream BID to ears. \\nAllegra QAM (continue), Claritin QHS (continue)\\nProtopic ointment 0.1% QD - BID to ears\\nMimyx BID to ears
Detail Level: Simple
Continue Regimen: Valtrex 1g 2 PO QD at first sign, then repeat 12 hours later - use PRN flares

## 2021-04-19 ENCOUNTER — APPOINTMENT (RX ONLY)
Dept: URBAN - METROPOLITAN AREA CLINIC 125 | Facility: CLINIC | Age: 56
Setting detail: DERMATOLOGY
End: 2021-04-19

## 2021-04-19 DIAGNOSIS — L71.8 OTHER ROSACEA: ICD-10-CM | Status: INADEQUATELY CONTROLLED

## 2021-04-19 DIAGNOSIS — L20.89 OTHER ATOPIC DERMATITIS: ICD-10-CM | Status: STABLE

## 2021-04-19 DIAGNOSIS — L259 CONTACT DERMATITIS AND OTHER ECZEMA, UNSPECIFIED CAUSE: ICD-10-CM | Status: INADEQUATELY CONTROLLED

## 2021-04-19 DIAGNOSIS — B00.1 HERPESVIRAL VESICULAR DERMATITIS: ICD-10-CM | Status: STABLE

## 2021-04-19 DIAGNOSIS — Z79.899 OTHER LONG TERM (CURRENT) DRUG THERAPY: ICD-10-CM

## 2021-04-19 PROBLEM — L23.9 ALLERGIC CONTACT DERMATITIS, UNSPECIFIED CAUSE: Status: ACTIVE | Noted: 2021-04-19

## 2021-04-19 PROCEDURE — ? PRESCRIPTION

## 2021-04-19 PROCEDURE — ? PRESCRIPTION MEDICATION MANAGEMENT

## 2021-04-19 PROCEDURE — ? COUNSELING

## 2021-04-19 PROCEDURE — ? CHRONOLOGY OF PRESENT ILLNESS

## 2021-04-19 PROCEDURE — ? HIGH RISK MEDICATION MONITORING

## 2021-04-19 PROCEDURE — ? TREATMENT REGIMEN

## 2021-04-19 PROCEDURE — ? ORDER TESTS

## 2021-04-19 PROCEDURE — 99214 OFFICE O/P EST MOD 30 MIN: CPT

## 2021-04-19 PROCEDURE — ? DUPIXENT MONITORING

## 2021-04-19 RX ORDER — HYDROCORTISONE ACETATE AND PRAMOXINE HYDROCHLORIDE 25; 10 MG/G; MG/G
CREAM TOPICAL
Qty: 1 | Refills: 1 | Status: ERX | COMMUNITY
Start: 2021-04-19

## 2021-04-19 RX ORDER — MINOCYCLINE HYDROCHLORIDE 90 MG/1
CAPSULE, EXTENDED RELEASE ORAL
Qty: 30 | Refills: 1 | Status: ERX | COMMUNITY
Start: 2021-04-19

## 2021-04-19 RX ADMIN — MINOCYCLINE HYDROCHLORIDE: 90 CAPSULE, EXTENDED RELEASE ORAL at 00:00

## 2021-04-19 RX ADMIN — HYDROCORTISONE ACETATE AND PRAMOXINE HYDROCHLORIDE: 25; 10 CREAM TOPICAL at 00:00

## 2021-04-19 ASSESSMENT — LOCATION DETAILED DESCRIPTION DERM
LOCATION DETAILED: RIGHT CAVUM CONCHA
LOCATION DETAILED: RIGHT CYMBA CONCHA
LOCATION DETAILED: LEFT AXILLARY VAULT
LOCATION DETAILED: LEFT INFERIOR VERMILION LIP
LOCATION DETAILED: LEFT MEDIAL FRONTAL SCALP
LOCATION DETAILED: LEFT CAVUM CONCHA
LOCATION DETAILED: LEFT PUPIL
LOCATION DETAILED: LEFT INFERIOR CRUS OF ANTIHELIX
LOCATION DETAILED: RIGHT PUPIL

## 2021-04-19 ASSESSMENT — LOCATION SIMPLE DESCRIPTION DERM
LOCATION SIMPLE: LEFT EAR
LOCATION SIMPLE: LEFT EYE
LOCATION SIMPLE: LEFT LIP
LOCATION SIMPLE: LEFT SCALP
LOCATION SIMPLE: LEFT AXILLARY VAULT
LOCATION SIMPLE: RIGHT EAR
LOCATION SIMPLE: RIGHT EYE

## 2021-04-19 ASSESSMENT — BSA ECZEMA: % BODY COVERED IN ECZEMA: 1

## 2021-04-19 ASSESSMENT — LOCATION ZONE DERM
LOCATION ZONE: LIP
LOCATION ZONE: SCALP
LOCATION ZONE: AXILLAE
LOCATION ZONE: EAR
LOCATION ZONE: CORNEA

## 2021-04-19 ASSESSMENT — SEVERITY ASSESSMENT 2020: SEVERITY 2020: MILD

## 2021-04-19 NOTE — PROCEDURE: ORDER TESTS
Bill For Surgical Tray: no
Billing Type: Third-Party Bill
Lab Facility: [id number]
Expected Date Of Service: 04/19/2021
Performing Laboratory: [id number]

## 2021-04-19 NOTE — PROCEDURE: DUPIXENT MONITORING
Initial Cmp (Optional): 8/4/2020
Detail Level: Zone
Length Of Therapy: 7 months
Latest Cbc (Optional): 03/17/2021
Add High Risk Medication Management Associated Diagnosis?: No
Comments: Start 9/1/2020
Patient Reported Weight(Optional But Include Units): 215

## 2021-04-19 NOTE — HPI: MEDICATION (DUPIXENT)
Is This A New Presentation, Or A Follow-Up?: Follow Up Mark
What Type Of Rash Do You Have?: atopic dermatitis
How Severe Is It?: moderate

## 2021-04-19 NOTE — HPI: TESTING (BLOOD WORK)
Have You Had Previous Labs For This Condition?: has had previous labs
When Was Your Last Blood Work Done?: 03/17/2021

## 2021-04-19 NOTE — PROCEDURE: CHRONOLOGY OF PRESENT ILLNESS
Chronology Of Present Illness: 8/8/2019:  Rash, located on the right ear and scalp. The rash is scaly, red, and itchy. The rash has been present for years. She has arthritis. She is not currently on any medications. She was started on Olux foam and ketoconazole cream.\\n11/17/2019: scalp is improved, ears are not. 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 She was started on hydrocortisone butuyrate and ketoconazole for the underarms. Biopsy obtained (eczematous dermatitis)\\n1/3/2020: Axillae have improved. scalp and ears as well. Continue current topicals and Pramosone lotion was added for the underarms.\\n2/5/2020: drastic improvement of axillae. She discontinued Hydrocortisone cream and is only using ketoconazole cream. scalp and ears are stable. Start Elidel cream to ears. \\n3/25/2020: scalp and ears not improved. Discussed Dupixent with patient. Consider after Cn19. Continue current topicals.\\n5/27/2020: scalp still flaring. Punch bx obtained of neck. (folliculitis, but not c/w clinical symptoms and presentation. Favor eczematous derm/AD. Continue current topicals. \\n8/4/2020: rash not well controlled on topicals. Start Dupixent enrollment. IGA score 4. Full biologic labs obtained. RX Valtrex given.\\n9/1/2020: Dupixent start today. Recommend Systane eyedrops and Valtrex PRN as needed. \\n10/6/2020: Patient is somewhat improved, overall itching is better. Her ears are still very itchy. She is using Olux foam and ketoconazole cream to ears. She complains of new onset of joint pain above elbows and shoulders. She will see Dr. Dai and we will send letter.\\n11/3/2020: patient is improved overall. She still complains of itchy ears. Recommend talking to her audiologist to see if there are other hearing aids that are made of different material. RX Mimyx given for barrier cream. Continue Protopic 0.1% ointment QD PRN to ears and other topicals. Continue Dupixent injections. IGA score 1\\n12/9/2020: patient is much better. Ears are better. Hearing aid manufacturers told her that hearing aids are made out of medical grade hypoallergenic silicone. Itching has improved. Continue current topicals, antihistamines and Dupixent. Start Zonalon cream to ears for itching. Lab order given to patient today. Discussed that injection site reactions should subside.\\n3/17/21: eyes are very bothersome. Discussed possible component of rosacea. Rec warm rice sock compresses in addition to the Systane eye drops. For now continue Dupixent and topicals.\\n4/19/21: Eyes are still dry and bothersome. She states she will be seeing a dry eye specialist on Wednesday. She reports continued itching in the ears. Recommend not making too many changes to treatment regimen due to patient’s upcoming appt with dry eye specialist. Pramosone cream prescribed today for itching in ears, briefly discussed R/B/SE of gabapentin to consider starting at NOV if itching does not improve.
Detail Level: Zone

## 2021-04-19 NOTE — PROCEDURE: TREATMENT REGIMEN
Plan: IGA score 1
Initiate Treatment: Pramosone 2.5% cream BID to ears for itching
Continue Regimen: Dupixent 300mg SC QOWK \\nSystane eye drops QD \\nOlux 0.05% foam BID PRN flares\\nKetoconazole 2% shampoo BIW-TIW\\nKetoconazole 2% cream BID to ears\\nAllegra QAM (continue)\\nClaritin QHS (continue)\\nProtopic ointment 0.1% QD-BID to ears\\nMimyx BID to ears\\nZonalon cream to ears for itching (warned of increased somnolence)
Detail Level: Simple

## 2021-04-19 NOTE — PROCEDURE: PRESCRIPTION MEDICATION MANAGEMENT
Detail Level: Zone
Render In Strict Bullet Format?: No
Plan: Patient will be seeing a dry eye specialist on Wednesday. Advised not changing treatment regimen until after appt. Recommend stopping Oracea and switching to Ximino 90mg after her appt if ok with specialist.
Continue Regimen: Oracea 40mg 1 PO QD with dinner
Initiate Treatment: Ximino 90mg 1 PO QD with dinner (see Plan note below)
Continue Regimen: Protopic ointment BID to ears\\nMimyx cream as barrier\\nOlux 0.05% foam BID PRN flares\\nZonalon cream BID (warned of increased somnolence with its usage)
Initiate Treatment: Pramosone 2.5% cream BID to ears for itching
Plan: Previously offered ILK, patient deferred. Consider in the future with topical numbing prior. Discussed R/B/SE of gabapentin to consider if itching persists.
Continue Regimen: Valtrex 1g 2 PO QD at first sign, then repeat 12 hours later - use PRN flares

## 2021-07-19 ENCOUNTER — APPOINTMENT (RX ONLY)
Dept: URBAN - METROPOLITAN AREA CLINIC 125 | Facility: CLINIC | Age: 56
Setting detail: DERMATOLOGY
End: 2021-07-19

## 2021-07-19 DIAGNOSIS — B00.1 HERPESVIRAL VESICULAR DERMATITIS: ICD-10-CM

## 2021-07-19 DIAGNOSIS — L71.8 OTHER ROSACEA: ICD-10-CM | Status: STABLE

## 2021-07-19 DIAGNOSIS — L259 CONTACT DERMATITIS AND OTHER ECZEMA, UNSPECIFIED CAUSE: ICD-10-CM | Status: STABLE

## 2021-07-19 DIAGNOSIS — L20.89 OTHER ATOPIC DERMATITIS: ICD-10-CM | Status: STABLE

## 2021-07-19 DIAGNOSIS — Z79.899 OTHER LONG TERM (CURRENT) DRUG THERAPY: ICD-10-CM

## 2021-07-19 DIAGNOSIS — D72.12 DRUG RASH WITH EOSINOPHILIA AND SYSTEMIC SYMPTOMS SYNDROME: ICD-10-CM

## 2021-07-19 PROBLEM — L30.9 DERMATITIS, UNSPECIFIED: Status: ACTIVE | Noted: 2021-07-19

## 2021-07-19 PROBLEM — L23.9 ALLERGIC CONTACT DERMATITIS, UNSPECIFIED CAUSE: Status: ACTIVE | Noted: 2021-07-19

## 2021-07-19 PROCEDURE — ? TREATMENT REGIMEN

## 2021-07-19 PROCEDURE — ? PRESCRIPTION

## 2021-07-19 PROCEDURE — ? DUPIXENT MONITORING

## 2021-07-19 PROCEDURE — ? PRESCRIPTION MEDICATION MANAGEMENT

## 2021-07-19 PROCEDURE — ? HIGH RISK MEDICATION MONITORING

## 2021-07-19 PROCEDURE — ? ORDER TESTS

## 2021-07-19 PROCEDURE — ? CHRONOLOGY OF PRESENT ILLNESS

## 2021-07-19 PROCEDURE — ? COUNSELING

## 2021-07-19 PROCEDURE — 99214 OFFICE O/P EST MOD 30 MIN: CPT

## 2021-07-19 RX ORDER — VALACYCLOVIR HYDROCHLORIDE 1 G/1
TABLET, FILM COATED ORAL
Qty: 12 | Refills: 3 | Status: ERX | COMMUNITY
Start: 2021-07-19

## 2021-07-19 RX ADMIN — VALACYCLOVIR HYDROCHLORIDE: 1 TABLET, FILM COATED ORAL at 00:00

## 2021-07-19 ASSESSMENT — LOCATION DETAILED DESCRIPTION DERM
LOCATION DETAILED: LEFT INFERIOR CRUS OF ANTIHELIX
LOCATION DETAILED: RIGHT PUPIL
LOCATION DETAILED: LEFT PUPIL
LOCATION DETAILED: LEFT MEDIAL FRONTAL SCALP
LOCATION DETAILED: RIGHT CYMBA CONCHA
LOCATION DETAILED: LEFT INFERIOR VERMILION LIP
LOCATION DETAILED: LEFT AXILLARY VAULT
LOCATION DETAILED: RIGHT CAVUM CONCHA
LOCATION DETAILED: RIGHT MEDIAL UPPER BACK
LOCATION DETAILED: LEFT CAVUM CONCHA

## 2021-07-19 ASSESSMENT — LOCATION SIMPLE DESCRIPTION DERM
LOCATION SIMPLE: LEFT SCALP
LOCATION SIMPLE: LEFT AXILLARY VAULT
LOCATION SIMPLE: RIGHT UPPER BACK
LOCATION SIMPLE: LEFT EAR
LOCATION SIMPLE: RIGHT EYE
LOCATION SIMPLE: RIGHT EAR
LOCATION SIMPLE: LEFT EYE
LOCATION SIMPLE: LEFT LIP

## 2021-07-19 ASSESSMENT — LOCATION ZONE DERM
LOCATION ZONE: EAR
LOCATION ZONE: CORNEA
LOCATION ZONE: LIP
LOCATION ZONE: SCALP
LOCATION ZONE: AXILLAE
LOCATION ZONE: TRUNK

## 2021-07-19 ASSESSMENT — SEVERITY ASSESSMENT 2020: SEVERITY 2020: MILD

## 2021-07-19 ASSESSMENT — BSA ECZEMA: % BODY COVERED IN ECZEMA: 1

## 2021-07-19 NOTE — PROCEDURE: CHRONOLOGY OF PRESENT ILLNESS
Chronology Of Present Illness: 8/8/2019:  Rash, located on the right ear and scalp. The rash is scaly, red, and itchy. The rash has been present for years. She has arthritis. She is not currently on any medications. She was started on Olux foam and ketoconazole cream.\\n11/17/2019: scalp is improved, ears are not. 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 She was started on hydrocortisone butuyrate and ketoconazole for the underarms. Biopsy obtained (eczematous dermatitis)\\n1/3/2020: Axillae have improved. scalp and ears as well. Continue current topicals and Pramosone lotion was added for the underarms.\\n2/5/2020: drastic improvement of axillae. She discontinued Hydrocortisone cream and is only using ketoconazole cream. scalp and ears are stable. Start Elidel cream to ears. \\n3/25/2020: scalp and ears not improved. Discussed Dupixent with patient. Consider after Cn19. Continue current topicals.\\n5/27/2020: scalp still flaring. Punch bx obtained of neck. (folliculitis, but not c/w clinical symptoms and presentation. Favor eczematous derm/AD. Continue current topicals. \\n8/4/2020: rash not well controlled on topicals. Start Dupixent enrollment. IGA score 4. Full biologic labs obtained. RX Valtrex given.\\n9/1/2020: Dupixent start today. Recommend Systane eyedrops and Valtrex PRN as needed. \\n10/6/2020: Patient is somewhat improved, overall itching is better. Her ears are still very itchy. She is using Olux foam and ketoconazole cream to ears. She complains of new onset of joint pain above elbows and shoulders. She will see Dr. aDi and we will send letter.\\n11/3/2020: patient is improved overall. She still complains of itchy ears. Recommend talking to her audiologist to see if there are other hearing aids that are made of different material. RX Mimyx given for barrier cream. Continue Protopic 0.1% ointment QD PRN to ears and other topicals. Continue Dupixent injections. IGA score 1\\n12/9/2020: patient is much better. Ears are better. Hearing aid manufacturers told her that hearing aids are made out of medical grade hypoallergenic silicone. Itching has improved. Continue current topicals, antihistamines and Dupixent. Start Zonalon cream to ears for itching. Lab order given to patient today. Discussed that injection site reactions should subside.\\n3/17/21: eyes are very bothersome. Discussed possible component of rosacea. Rec warm rice sock compresses in addition to the Systane eye drops. For now continue Dupixent and topicals.\\n4/19/21: Eyes are still dry and bothersome. She states she will be seeing a dry eye specialist on Wednesday. She reports continued itching in the ears. Recommend not making too many changes to treatment regimen due to patient’s upcoming appt with dry eye specialist. Pramosone cream prescribed today for itching in ears, briefly discussed R/B/SE of gabapentin to consider starting at N[facility] if itching does not improve.\\n7/19/21: stable. Continue Dupixent Q 2 weeks and topicals. Recently diagnosed with gastroenteritis and possible sepsis. 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
Detail Level: Zone

## 2021-07-19 NOTE — PROCEDURE: TREATMENT REGIMEN
Plan: IGA score 1
Continue Regimen: Dupixent 300mg SC QOWK \\nSystane eye drops QD \\nOlux 0.05% foam BID PRN flares\\nKetoconazole 2% shampoo BIW-TIW\\nKetoconazole 2% cream BID to ears\\nAllegra QAM (continue)\\nClaritin QHS (continue)\\nProtopic ointment 0.1% QD-BID to ears\\nMimyx BID to ears\\nVytone cream for ears
Detail Level: Simple

## 2021-07-19 NOTE — PROCEDURE: DUPIXENT MONITORING
Initial Cmp (Optional): 8/4/2020
Detail Level: Zone
Length Of Therapy: 10 months
Latest Cbc (Optional): 03/17/2021
Add High Risk Medication Management Associated Diagnosis?: No
Comments: Start 9/1/2020
Patient Reported Weight(Optional But Include Units): 215

## 2021-07-19 NOTE — PROCEDURE: ORDER TESTS
Bill For Surgical Tray: no
Billing Type: Third-Party Bill
Lab Facility: [id number]
Expected Date Of Service: 04/19/2021
Performing Laboratory: [id number]

## 2021-07-19 NOTE — PROCEDURE: PRESCRIPTION MEDICATION MANAGEMENT
Detail Level: Zone
Render In Strict Bullet Format?: No
Plan: Patient should avoid all MCN related drugs. Patient will see ophthalmologist and restart doxycycline if he agrees. Discussed low potential of cross reactivity with doxycycline.
Discontinue Regimen: Ximino 90mg 1 PO QD with dinner (due to DRESS syndrome)\\n\\nOracea 40mg 1 PO QD with dinner (due to possible cross reaction)
Continue Regimen: Protopic ointment BID to ears\\nMimyx cream as barrier\\nOlux 0.05% foam BID PRN flares\\nVytone cream BID ears as needed
Continue Regimen: Valtrex 1g 2 PO QD at first sign, then repeat 12 hours later - use PRN flares
Discontinue Regimen: MCN
Plan: History of Elevated WBC and liver values (hospitalized with DX of gastroenteritis +/-sepsis in May 2021). Discussed it very well could have been a severe reaction to MCN.

## 2021-10-20 ENCOUNTER — APPOINTMENT (RX ONLY)
Dept: URBAN - METROPOLITAN AREA CLINIC 125 | Facility: CLINIC | Age: 56
Setting detail: DERMATOLOGY
End: 2021-10-20

## 2021-10-20 DIAGNOSIS — B00.1 HERPESVIRAL VESICULAR DERMATITIS: ICD-10-CM | Status: STABLE

## 2021-10-20 DIAGNOSIS — L20.89 OTHER ATOPIC DERMATITIS: ICD-10-CM | Status: STABLE

## 2021-10-20 DIAGNOSIS — Z79.899 OTHER LONG TERM (CURRENT) DRUG THERAPY: ICD-10-CM

## 2021-10-20 DIAGNOSIS — L259 CONTACT DERMATITIS AND OTHER ECZEMA, UNSPECIFIED CAUSE: ICD-10-CM | Status: STABLE

## 2021-10-20 DIAGNOSIS — L71.8 OTHER ROSACEA: ICD-10-CM | Status: STABLE

## 2021-10-20 DIAGNOSIS — L65.0 TELOGEN EFFLUVIUM: ICD-10-CM | Status: INADEQUATELY CONTROLLED

## 2021-10-20 DIAGNOSIS — D72.12 DRUG RASH WITH EOSINOPHILIA AND SYSTEMIC SYMPTOMS SYNDROME: ICD-10-CM | Status: RESOLVED

## 2021-10-20 PROBLEM — L23.9 ALLERGIC CONTACT DERMATITIS, UNSPECIFIED CAUSE: Status: ACTIVE | Noted: 2021-10-20

## 2021-10-20 PROBLEM — L30.9 DERMATITIS, UNSPECIFIED: Status: ACTIVE | Noted: 2021-10-20

## 2021-10-20 PROCEDURE — ? PRESCRIPTION MEDICATION MANAGEMENT

## 2021-10-20 PROCEDURE — ? CHRONOLOGY OF PRESENT ILLNESS

## 2021-10-20 PROCEDURE — ? PRESCRIPTION

## 2021-10-20 PROCEDURE — 99214 OFFICE O/P EST MOD 30 MIN: CPT

## 2021-10-20 PROCEDURE — ? TREATMENT REGIMEN

## 2021-10-20 PROCEDURE — ? COUNSELING

## 2021-10-20 PROCEDURE — ? DUPIXENT MONITORING

## 2021-10-20 PROCEDURE — ? HIGH RISK MEDICATION MONITORING

## 2021-10-20 PROCEDURE — ? ADDITIONAL NOTES

## 2021-10-20 RX ORDER — DOXYCYCLINE HYCLATE 20 MG/1
TABLET, FILM COATED ORAL
Qty: 60 | Refills: 3 | Status: ERX | COMMUNITY
Start: 2021-10-20

## 2021-10-20 RX ADMIN — DOXYCYCLINE HYCLATE: 20 TABLET, FILM COATED ORAL at 00:00

## 2021-10-20 ASSESSMENT — LOCATION SIMPLE DESCRIPTION DERM
LOCATION SIMPLE: LEFT FOREARM
LOCATION SIMPLE: RIGHT EYE
LOCATION SIMPLE: RIGHT EAR
LOCATION SIMPLE: RIGHT UPPER BACK
LOCATION SIMPLE: LEFT AXILLARY VAULT
LOCATION SIMPLE: LEFT LIP
LOCATION SIMPLE: LEFT EYE
LOCATION SIMPLE: LEFT EAR
LOCATION SIMPLE: LEFT SCALP

## 2021-10-20 ASSESSMENT — LOCATION ZONE DERM
LOCATION ZONE: AXILLAE
LOCATION ZONE: EAR
LOCATION ZONE: CORNEA
LOCATION ZONE: TRUNK
LOCATION ZONE: SCALP
LOCATION ZONE: ARM
LOCATION ZONE: LIP

## 2021-10-20 ASSESSMENT — LOCATION DETAILED DESCRIPTION DERM
LOCATION DETAILED: RIGHT PUPIL
LOCATION DETAILED: RIGHT MEDIAL UPPER BACK
LOCATION DETAILED: RIGHT CAVUM CONCHA
LOCATION DETAILED: LEFT AXILLARY VAULT
LOCATION DETAILED: LEFT PROXIMAL DORSAL FOREARM
LOCATION DETAILED: LEFT PUPIL
LOCATION DETAILED: LEFT MEDIAL FRONTAL SCALP
LOCATION DETAILED: LEFT CAVUM CONCHA
LOCATION DETAILED: LEFT INFERIOR VERMILION LIP
LOCATION DETAILED: RIGHT CYMBA CONCHA
LOCATION DETAILED: LEFT INFERIOR CRUS OF ANTIHELIX

## 2021-10-20 ASSESSMENT — BSA ECZEMA: % BODY COVERED IN ECZEMA: 1

## 2021-10-20 ASSESSMENT — SEVERITY ASSESSMENT 2020: SEVERITY 2020: MILD

## 2021-10-20 NOTE — PROCEDURE: TREATMENT REGIMEN
Plan: IGA score 1
Continue Regimen: Dupixent 300mg SC QOWK \\nSystane eye drops QD \\nOlux 0.05% foam BID PRN flares\\nKetoconazole 2% shampoo BIW-TIW\\nKetoconazole 2% cream BID to ears\\nAllegra QAM (continue)\\nClaritin QHS (continue)\\nProtopic ointment 0.1% QD-BID to ears\\nMimyx BID to ears\\nVytone cream for ears
Detail Level: Simple
Plan: Discussed diagnosis with patient. Possibly induced by hospitalization in May 2021. Obs for resolution
Detail Level: Generalized
Plan: Pictures reviewed. Most likely consistent with ACD, resolved

## 2021-10-20 NOTE — PROCEDURE: ADDITIONAL NOTES
Render Risk Assessment In Note?: no
Detail Level: Simple
Additional Notes: CONSUELO was signed to obtain labs from Dr. Muro

## 2021-10-20 NOTE — PROCEDURE: DUPIXENT MONITORING
Initial Cmp (Optional): 8/4/2020
Detail Level: Zone
Length Of Therapy: 1 year
Latest Cbc (Optional): 03/17/2021
Add High Risk Medication Management Associated Diagnosis?: No
Comments: Start 9/1/2020
Patient Reported Weight(Optional But Include Units): 215

## 2021-10-20 NOTE — PROCEDURE: PRESCRIPTION MEDICATION MANAGEMENT
Detail Level: Zone
Render In Strict Bullet Format?: No
Plan: Patient should avoid all MCN related drugs. Patient previously discontinued Ximino 90mg 1 PO QD with dinner (due to DRESS syndrome) and Oracea 40mg 1 PO QD with dinner (due to possible cross reaction). Patient saw ophthalmologist and restarted doxycycline 50mg QD. Discussed low potential of cross reactivity with doxycycline. Discussed possible resistance development with antibiotic dosing (50mg), recommend switching to doxycycline 20mg BID. Patient agreeable.
Initiate Treatment: doxycycline hyclate 20 mg tablet \\nSig: Take one tablet po bid with food
Plan: History of Elevated WBC and liver values (hospitalized with DX of gastroenteritis +/-sepsis in May 2021). Discussed it very well could have been a severe reaction to MCN.
Continue Regimen: Protopic ointment BID to ears\\nMimyx cream as barrier\\nOlux 0.05% foam BID PRN flares\\nVytone cream BID ears as needed
Continue Regimen: Valtrex 1g 2 PO QD at first sign, then repeat 12 hours later - use PRN flares

## 2021-10-20 NOTE — PROCEDURE: CHRONOLOGY OF PRESENT ILLNESS
Chronology Of Present Illness: 8/8/2019:  Rash, located on the right ear and scalp. The rash is scaly, red, and itchy. The rash has been present for years. She has arthritis. She is not currently on any medications. She was started on Olux foam and ketoconazole cream.\\n11/17/2019: scalp is improved, ears are not. 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 She was started on hydrocortisone butuyrate and ketoconazole for the underarms. Biopsy obtained (eczematous dermatitis)\\n1/3/2020: Axillae have improved. scalp and ears as well. Continue current topicals and Pramosone lotion was added for the underarms.\\n2/5/2020: drastic improvement of axillae. She discontinued Hydrocortisone cream and is only using ketoconazole cream. scalp and ears are stable. Start Elidel cream to ears. \\n3/25/2020: scalp and ears not improved. Discussed Dupixent with patient. Consider after Cn19. Continue current topicals.\\n5/27/2020: scalp still flaring. Punch bx obtained of neck. (folliculitis, but not c/w clinical symptoms and presentation. Favor eczematous derm/AD. Continue current topicals. \\n8/4/2020: rash not well controlled on topicals. Start Dupixent enrollment. IGA score 4. Full biologic labs obtained. RX Valtrex given.\\n9/1/2020: Dupixent start today. Recommend Systane eyedrops and Valtrex PRN as needed. \\n10/6/2020: Patient is somewhat improved, overall itching is better. Her ears are still very itchy. She is using Olux foam and ketoconazole cream to ears. She complains of new onset of joint pain above elbows and shoulders. She will see Dr. Dai and we will send letter.\\n11/3/2020: patient is improved overall. She still complains of itchy ears. Recommend talking to her audiologist to see if there are other hearing aids that are made of different material. RX Mimyx given for barrier cream. Continue Protopic 0.1% ointment QD PRN to ears and other topicals. Continue Dupixent injections. IGA score 1\\n12/9/2020: patient is much better. Ears are better. Hearing aid manufacturers told her that hearing aids are made out of medical grade hypoallergenic silicone. Itching has improved. Continue current topicals, antihistamines and Dupixent. Start Zonalon cream to ears for itching. Lab order given to patient today. Discussed that injection site reactions should subside.\\n3/17/21: eyes are very bothersome. Discussed possible component of rosacea. Rec warm rice sock compresses in addition to the Systane eye drops. For now continue Dupixent and topicals.\\n4/19/21: Eyes are still dry and bothersome. She states she will be seeing a dry eye specialist on Wednesday. She reports continued itching in the ears. Recommend not making too many changes to treatment regimen due to patient’s upcoming appt with dry eye specialist. Pramosone cream prescribed today for itching in ears, briefly discussed R/B/SE of gabapentin to consider starting at NOV if itching does not improve.\\n7/19/21: stable. Continue Dupixent Q 2 weeks and topicals. Recently diagnosed with gastroenteritis and possible sepsis. 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 Continue Dupixent and all topicals as needed.
Detail Level: Zone

## 2021-10-20 NOTE — HPI: RASH
What Type Of Note Output Would You Prefer (Optional)?: Standard Output
Is This A New Presentation, Or A Follow-Up?: Rash
Additional History: Rash has since resolved, she brought in pictures.

## 2021-10-21 ENCOUNTER — RX ONLY (OUTPATIENT)
Age: 56
Setting detail: RX ONLY
End: 2021-10-21

## 2021-10-21 RX ORDER — DOXYCYCLINE HYCLATE 20 MG/1
TABLET, FILM COATED ORAL
Qty: 60 | Refills: 2 | Status: ERX

## 2022-01-19 ENCOUNTER — RX ONLY (OUTPATIENT)
Age: 57
Setting detail: RX ONLY
End: 2022-01-19

## 2022-01-19 ENCOUNTER — APPOINTMENT (RX ONLY)
Dept: URBAN - METROPOLITAN AREA CLINIC 125 | Facility: CLINIC | Age: 57
Setting detail: DERMATOLOGY
End: 2022-01-19

## 2022-01-19 DIAGNOSIS — B00.1 HERPESVIRAL VESICULAR DERMATITIS: ICD-10-CM

## 2022-01-19 DIAGNOSIS — L259 CONTACT DERMATITIS AND OTHER ECZEMA, UNSPECIFIED CAUSE: ICD-10-CM

## 2022-01-19 DIAGNOSIS — L20.89 OTHER ATOPIC DERMATITIS: ICD-10-CM | Status: STABLE

## 2022-01-19 DIAGNOSIS — L71.8 OTHER ROSACEA: ICD-10-CM | Status: STABLE

## 2022-01-19 DIAGNOSIS — Z79.899 OTHER LONG TERM (CURRENT) DRUG THERAPY: ICD-10-CM

## 2022-01-19 DIAGNOSIS — T88.7XX: ICD-10-CM

## 2022-01-19 DIAGNOSIS — L65.0 TELOGEN EFFLUVIUM: ICD-10-CM

## 2022-01-19 PROBLEM — L23.9 ALLERGIC CONTACT DERMATITIS, UNSPECIFIED CAUSE: Status: ACTIVE | Noted: 2022-01-19

## 2022-01-19 PROBLEM — T88.7XXA UNSPECIFIED ADVERSE EFFECT OF DRUG OR MEDICAMENT, INITIAL ENCOUNTER: Status: ACTIVE | Noted: 2022-01-19

## 2022-01-19 PROCEDURE — ? ADDITIONAL NOTES

## 2022-01-19 PROCEDURE — ? OTHER

## 2022-01-19 PROCEDURE — ? ORDER TESTS

## 2022-01-19 PROCEDURE — ? COUNSELING

## 2022-01-19 PROCEDURE — ? HIGH RISK MEDICATION MONITORING

## 2022-01-19 PROCEDURE — ? CHRONOLOGY OF PRESENT ILLNESS

## 2022-01-19 PROCEDURE — ? DUPIXENT MONITORING

## 2022-01-19 PROCEDURE — 99214 OFFICE O/P EST MOD 30 MIN: CPT

## 2022-01-19 PROCEDURE — ? PRESCRIPTION MEDICATION MANAGEMENT

## 2022-01-19 PROCEDURE — ? TREATMENT REGIMEN

## 2022-01-19 RX ORDER — CLOBETASOL PROPIONATE 0.5 MG/G
AEROSOL, FOAM TOPICAL
Qty: 100 | Refills: 3 | Status: ERX | COMMUNITY
Start: 2022-01-19

## 2022-01-19 ASSESSMENT — LOCATION DETAILED DESCRIPTION DERM
LOCATION DETAILED: LEFT VENTRAL PROXIMAL FOREARM
LOCATION DETAILED: LEFT PUPIL
LOCATION DETAILED: LEFT INFERIOR VERMILION LIP
LOCATION DETAILED: LEFT CAVUM CONCHA
LOCATION DETAILED: RIGHT CYMBA CONCHA
LOCATION DETAILED: LEFT INFERIOR CRUS OF ANTIHELIX
LOCATION DETAILED: LEFT AXILLARY VAULT
LOCATION DETAILED: LEFT ANTERIOR PROXIMAL UPPER ARM
LOCATION DETAILED: LEFT MEDIAL FRONTAL SCALP
LOCATION DETAILED: RIGHT VENTRAL PROXIMAL FOREARM
LOCATION DETAILED: RIGHT CAVUM CONCHA
LOCATION DETAILED: RIGHT PUPIL

## 2022-01-19 ASSESSMENT — LOCATION SIMPLE DESCRIPTION DERM
LOCATION SIMPLE: LEFT EYE
LOCATION SIMPLE: RIGHT FOREARM
LOCATION SIMPLE: LEFT SCALP
LOCATION SIMPLE: LEFT UPPER ARM
LOCATION SIMPLE: LEFT LIP
LOCATION SIMPLE: RIGHT EAR
LOCATION SIMPLE: LEFT FOREARM
LOCATION SIMPLE: RIGHT EYE
LOCATION SIMPLE: LEFT AXILLARY VAULT
LOCATION SIMPLE: LEFT EAR

## 2022-01-19 ASSESSMENT — LOCATION ZONE DERM
LOCATION ZONE: EAR
LOCATION ZONE: SCALP
LOCATION ZONE: AXILLAE
LOCATION ZONE: CORNEA
LOCATION ZONE: LIP
LOCATION ZONE: ARM

## 2022-01-19 ASSESSMENT — BSA ECZEMA: % BODY COVERED IN ECZEMA: 1

## 2022-01-19 ASSESSMENT — SEVERITY ASSESSMENT 2020: SEVERITY 2020: MILD

## 2022-01-19 NOTE — PROCEDURE: TREATMENT REGIMEN
Plan: IGA score 1
Continue Regimen: Dupixent 300mg SC QOWK \\nSystane eye drops QD \\nOlux 0.05% foam BID PRN flares\\nKetoconazole 2% shampoo BIW-TIW\\nKetoconazole 2% cream BID to ears\\nAllegra QAM (continue)\\nClaritin QHS (continue)\\nProtopic ointment 0.1% QD-BID to ears\\nMimyx BID to ears\\nVytone cream BID PRN flares to ears
Detail Level: Simple
Plan: Discussed diagnosis with patient. Possibly induced by hospitalization in May 2021. Obs for resolution
Detail Level: Generalized

## 2022-01-19 NOTE — PROCEDURE: OTHER
Render Risk Assessment In Note?: no
Note Text (......Xxx Chief Complaint.): This diagnosis correlates with the
Other (Free Text): Patient states she had recent bloodwork with PCP Dr. Muro, CONSUELO signed today. Office fax # also provided to patient.
Detail Level: Simple

## 2022-01-19 NOTE — PROCEDURE: CHRONOLOGY OF PRESENT ILLNESS
Chronology Of Present Illness: 8/8/2019:  Rash, located on the right ear and scalp. The rash is scaly, red, and itchy. The rash has been present for years. She has arthritis. She is not currently on any medications. She was started on Olux foam and ketoconazole cream.\\n11/17/2019: scalp is improved, ears are not. 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 She was started on hydrocortisone butuyrate and ketoconazole for the underarms. Biopsy obtained (eczematous dermatitis)\\n1/3/2020: Axillae have improved. scalp and ears as well. Continue current topicals and Pramosone lotion was added for the underarms.\\n2/5/2020: drastic improvement of axillae. She discontinued Hydrocortisone cream and is only using ketoconazole cream. scalp and ears are stable. Start Elidel cream to ears. \\n3/25/2020: scalp and ears not improved. Discussed Dupixent with patient. Consider after Cn19. Continue current topicals.\\n5/27/2020: scalp still flaring. Punch bx obtained of neck. (folliculitis, but not c/w clinical symptoms and presentation. Favor eczematous derm/AD. Continue current topicals. \\n8/4/2020: rash not well controlled on topicals. Start Dupixent enrollment. IGA score 4. Full biologic labs obtained. RX Valtrex given.\\n9/1/2020: Dupixent start today. Recommend Systane eyedrops and Valtrex PRN as needed. \\n10/6/2020: Patient is somewhat improved, overall itching is better. Her ears are still very itchy. She is using Olux foam and ketoconazole cream to ears. She complains of new onset of joint pain above elbows and shoulders. She will see Dr. Dai and we will send letter.\\n11/3/2020: patient is improved overall. She still complains of itchy ears. Recommend talking to her audiologist to see if there are other hearing aids that are made of different material. RX Mimyx given for barrier cream. Continue Protopic 0.1% ointment QD PRN to ears and other topicals. Continue Dupixent injections. IGA score 1\\n12/9/2020: patient is much better. Ears are better. Hearing aid manufacturers told her that hearing aids are made out of medical grade hypoallergenic silicone. Itching has improved. Continue current topicals, antihistamines and Dupixent. Start Zonalon cream to ears for itching. Lab order given to patient today. Discussed that injection site reactions should subside.\\n3/17/21: eyes are very bothersome. Discussed possible component of rosacea. Rec warm rice sock compresses in addition to the Systane eye drops. For now continue Dupixent and topicals.\\n4/19/21: Eyes are still dry and bothersome. She states she will be seeing a dry eye specialist on Wednesday. She reports continued itching in the ears. Recommend not making too many changes to treatment regimen due to patient?s upcoming appt with dry eye specialist. Pramosone cream prescribed today for itching in ears, briefly discussed R/B/SE of gabapentin to consider starting at NOV if itching does not improve.\\n7/19/21: stable. Continue Dupixent Q 2 weeks and topicals. Recently diagnosed with gastroenteritis and possible sepsis. 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 Continue Dupixent and all topicals as needed.\\n1/19/22: patient is stable. Continue Dupixent and topicals PRN.
Detail Level: Zone

## 2022-01-19 NOTE — PROCEDURE: PRESCRIPTION MEDICATION MANAGEMENT
Detail Level: Zone
Render In Strict Bullet Format?: No
Plan: Patient should avoid all MCN related drugs. Patient previously discontinued Ximino 90mg 1 PO QD with dinner (due to DRESS syndrome) and Oracea 40mg 1 PO QD with dinner (due to possible cross reaction). Patient saw ophthalmologist and restarted doxycycline 50mg QD. Discussed low potential of cross reactivity with doxycycline. Discussed possible resistance development with antibiotic dosing (50mg), previously recommended switching to doxycycline 20mg BID. Doxy 20mg not covered by insurance, so patient is back on doxy 50mg 1 PO QD. Symptoms are stable.
Continue Regimen: Doxycycline 50mg 1 PO QD with food
Continue Regimen: Protopic ointment BID to ears\\nMimyx cream as barrier\\nOlux 0.05% foam BID PRN flares\\nVytone cream BID ears as needed
Continue Regimen: Valtrex 1g 2 PO QD at first sign, then repeat 12 hours later - use PRN flares
Plan: Patient reports hx of injection site reaction to COVID booster on the L upper arm. Recommend pretreating with topical steroids in the future.
Initiate Treatment: Olux 0.05% foam BID for a few days before injection and few days after PRN (patient has Rx at home)

## 2022-01-19 NOTE — HPI: RASH
How Severe Is Your Rash?: moderate
Is This A New Presentation, Or A Follow-Up?: Rash
Additional History: Patient reports injection site reaction after receiving COVID booster recently.

## 2022-01-19 NOTE — PROCEDURE: ORDER TESTS
Performing Laboratory: 0
Expected Date Of Service: 01/19/2022
Bill For Surgical Tray: no
Billing Type: Third-Party Bill

## 2022-04-20 ENCOUNTER — APPOINTMENT (RX ONLY)
Dept: URBAN - METROPOLITAN AREA CLINIC 125 | Facility: CLINIC | Age: 57
Setting detail: DERMATOLOGY
End: 2022-04-20

## 2022-04-20 DIAGNOSIS — L71.8 OTHER ROSACEA: ICD-10-CM | Status: STABLE

## 2022-04-20 DIAGNOSIS — Z79.899 OTHER LONG TERM (CURRENT) DRUG THERAPY: ICD-10-CM

## 2022-04-20 DIAGNOSIS — L65.0 TELOGEN EFFLUVIUM: ICD-10-CM | Status: STABLE

## 2022-04-20 DIAGNOSIS — L20.89 OTHER ATOPIC DERMATITIS: ICD-10-CM

## 2022-04-20 DIAGNOSIS — L259 CONTACT DERMATITIS AND OTHER ECZEMA, UNSPECIFIED CAUSE: ICD-10-CM | Status: STABLE

## 2022-04-20 PROBLEM — L23.9 ALLERGIC CONTACT DERMATITIS, UNSPECIFIED CAUSE: Status: ACTIVE | Noted: 2022-04-20

## 2022-04-20 PROCEDURE — ? ORDER TESTS

## 2022-04-20 PROCEDURE — ? OTHER

## 2022-04-20 PROCEDURE — ? HIGH RISK MEDICATION MONITORING

## 2022-04-20 PROCEDURE — ? ADDITIONAL NOTES

## 2022-04-20 PROCEDURE — 99214 OFFICE O/P EST MOD 30 MIN: CPT

## 2022-04-20 PROCEDURE — ? DUPIXENT MONITORING

## 2022-04-20 PROCEDURE — ? CHRONOLOGY OF PRESENT ILLNESS

## 2022-04-20 PROCEDURE — ? COUNSELING

## 2022-04-20 PROCEDURE — ? PRESCRIPTION MEDICATION MANAGEMENT

## 2022-04-20 PROCEDURE — ? TREATMENT REGIMEN

## 2022-04-20 ASSESSMENT — LOCATION SIMPLE DESCRIPTION DERM
LOCATION SIMPLE: RIGHT FOREARM
LOCATION SIMPLE: LEFT FOREARM
LOCATION SIMPLE: LEFT EYE
LOCATION SIMPLE: LEFT SCALP
LOCATION SIMPLE: LEFT EAR
LOCATION SIMPLE: RIGHT EYE
LOCATION SIMPLE: LEFT AXILLARY VAULT
LOCATION SIMPLE: RIGHT EAR

## 2022-04-20 ASSESSMENT — LOCATION DETAILED DESCRIPTION DERM
LOCATION DETAILED: LEFT AXILLARY VAULT
LOCATION DETAILED: LEFT PUPIL
LOCATION DETAILED: RIGHT PUPIL
LOCATION DETAILED: RIGHT CYMBA CONCHA
LOCATION DETAILED: LEFT INFERIOR CRUS OF ANTIHELIX
LOCATION DETAILED: LEFT MEDIAL FRONTAL SCALP
LOCATION DETAILED: LEFT CAVUM CONCHA
LOCATION DETAILED: RIGHT CAVUM CONCHA
LOCATION DETAILED: RIGHT VENTRAL PROXIMAL FOREARM
LOCATION DETAILED: LEFT VENTRAL PROXIMAL FOREARM

## 2022-04-20 ASSESSMENT — LOCATION ZONE DERM
LOCATION ZONE: CORNEA
LOCATION ZONE: EAR
LOCATION ZONE: SCALP
LOCATION ZONE: AXILLAE
LOCATION ZONE: ARM

## 2022-04-20 ASSESSMENT — ITCH NUMERIC RATING SCALE: HOW SEVERE IS YOUR ITCHING?: 1

## 2022-04-20 ASSESSMENT — BSA ECZEMA: % BODY COVERED IN ECZEMA: 2

## 2022-04-20 NOTE — PROCEDURE: CHRONOLOGY OF PRESENT ILLNESS
Chronology Of Present Illness: 8/8/2019:  Rash, located on the right ear and scalp. The rash is scaly, red, and itchy. The rash has been present for years. She has arthritis. She is not currently on any medications. She was started on Olux foam and ketoconazole cream.\\n11/17/2019: scalp is improved, ears are not. 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 She was started on hydrocortisone butuyrate and ketoconazole for the underarms. Biopsy obtained (eczematous dermatitis)\\n1/3/2020: Axillae have improved. scalp and ears as well. Continue current topicals and Pramosone lotion was added for the underarms.\\n2/5/2020: drastic improvement of axillae. She discontinued Hydrocortisone cream and is only using ketoconazole cream. scalp and ears are stable. Start Elidel cream to ears. \\n3/25/2020: scalp and ears not improved. Discussed Dupixent with patient. Consider after Cn19. Continue current topicals.\\n5/27/2020: scalp still flaring. Punch bx obtained of neck. (folliculitis, but not c/w clinical symptoms and presentation. Favor eczematous derm/AD. Continue current topicals. \\n8/4/2020: rash not well controlled on topicals. Start Dupixent enrollment. IGA score 4. Full biologic labs obtained. RX Valtrex given.\\n9/1/2020: Dupixent start today. Recommend Systane eyedrops and Valtrex PRN as needed. \\n10/6/2020: Patient is somewhat improved, overall itching is better. Her ears are still very itchy. She is using Olux foam and ketoconazole cream to ears. She complains of new onset of joint pain above elbows and shoulders. She will see Dr. Dai and we will send letter.\\n11/3/2020: patient is improved overall. She still complains of itchy ears. Recommend talking to her audiologist to see if there are other hearing aids that are made of different material. RX Mimyx given for barrier cream. Continue Protopic 0.1% ointment QD PRN to ears and other topicals. Continue Dupixent injections. IGA score 1\\n12/9/2020: patient is much better. Ears are better. Hearing aid manufacturers told her that hearing aids are made out of medical grade hypoallergenic silicone. Itching has improved. Continue current topicals, antihistamines and Dupixent. Start Zonalon cream to ears for itching. Lab order given to patient today. Discussed that injection site reactions should subside.\\n3/17/21: eyes are very bothersome. Discussed possible component of rosacea. Rec warm rice sock compresses in addition to the Systane eye drops. For now continue Dupixent and topicals.\\n4/19/21: Eyes are still dry and bothersome. She states she will be seeing a dry eye specialist on Wednesday. She reports continued itching in the ears. Recommend not making too many changes to treatment regimen due to patient?s upcoming appt with dry eye specialist. Pramosone cream prescribed today for itching in ears, briefly discussed R/B/SE of gabapentin to consider starting at NOV if itching does not improve.\\n7/19/21: stable. Continue Dupixent Q 2 weeks and topicals. Recently diagnosed with gastroenteritis and possible sepsis. 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 Continue Dupixent and all topicals as needed.\\n1/19/22: patient is stable. Continue Dupixent and topicals PRN.\\n4/20/22: her itching (due to oak pollen) starts 3-4 days prior to next injection. Discussed Rinvoque with patient. Discussed it controls the itching better. Discussed risks, benefits and side effects with patient (increase in malignancies, cardio vascular events, blood clots, DVT - usually associated with prior underlying smoking history or other diseases). Discussed injecting Dupixent Q10 days vs Q14 days. Patient desires to continue Dupixent at shorter intervals. Consider adding on Tim inhibitor during environmental allergy season in the future.
Detail Level: Zone

## 2022-04-20 NOTE — PROCEDURE: PRESCRIPTION MEDICATION MANAGEMENT
Detail Level: Zone
Render In Strict Bullet Format?: No
Plan: Patient should avoid all MCN related drugs. Patient previously discontinued Ximino 90mg 1 PO QD with dinner (due to DRESS syndrome) and Oracea 40mg 1 PO QD with dinner (due to possible cross reaction). Patient saw ophthalmologist and restarted doxycycline 50mg QD. Discussed low potential of cross reactivity with doxycycline. Discussed possible resistance development with antibiotic dosing (50mg), previously recommended switching to doxycycline 20mg BID. Doxy 20mg not covered by insurance, so patient is back on doxy 50mg 1 PO QD. Symptoms are stable.
Continue Regimen: Doxycycline 50mg 1 PO QD with food
Continue Regimen: Protopic ointment BID to ears\\nMimyx cream as barrier\\nOlux 0.05% foam BID PRN flares\\nVytone cream BID ears as needed
Continue Regimen: Systane eye drops QD \\nOlux 0.05% foam BID PRN flares\\nKetoconazole 2% shampoo BIW-TIW\\nKetoconazole 2% cream BID to ears\\nAllegra QAM (continue)\\nClaritin QHS (continue)\\nProtopic ointment 0.1% QD-BID to ears\\nMimyx BID to ears\\nVytone cream BID PRN flares to ears.
Modify Regimen: Dupixent 300mg SC every 9 days

## 2022-04-20 NOTE — PROCEDURE: TREATMENT REGIMEN
Plan: Discussed diagnosis with patient. Possibly induced by hospitalization in May 2021. Obs for resolution
Detail Level: Generalized

## 2022-04-20 NOTE — PROCEDURE: DUPIXENT MONITORING
Initial Cmp (Optional): 8/4/2020
Detail Level: Zone
Length Of Therapy: 1.5 years
Latest Cbc (Optional): 03/17/2021
Add High Risk Medication Management Associated Diagnosis?: No
Comments: Start 9/1/2020
Patient Reported Weight(Optional But Include Units): 215

## 2022-05-18 ENCOUNTER — APPOINTMENT (RX ONLY)
Dept: URBAN - METROPOLITAN AREA CLINIC 125 | Facility: CLINIC | Age: 57
Setting detail: DERMATOLOGY
End: 2022-05-18

## 2022-05-18 DIAGNOSIS — L20.89 OTHER ATOPIC DERMATITIS: ICD-10-CM | Status: INADEQUATELY CONTROLLED

## 2022-05-18 DIAGNOSIS — L259 CONTACT DERMATITIS AND OTHER ECZEMA, UNSPECIFIED CAUSE: ICD-10-CM

## 2022-05-18 DIAGNOSIS — L71.8 OTHER ROSACEA: ICD-10-CM

## 2022-05-18 DIAGNOSIS — Z79.899 OTHER LONG TERM (CURRENT) DRUG THERAPY: ICD-10-CM

## 2022-05-18 PROBLEM — L23.9 ALLERGIC CONTACT DERMATITIS, UNSPECIFIED CAUSE: Status: ACTIVE | Noted: 2022-05-18

## 2022-05-18 PROCEDURE — ? HIGH RISK MEDICATION MONITORING

## 2022-05-18 PROCEDURE — ? PRESCRIPTION MEDICATION MANAGEMENT

## 2022-05-18 PROCEDURE — ? COUNSELING

## 2022-05-18 PROCEDURE — ? ADDITIONAL NOTES

## 2022-05-18 PROCEDURE — 99214 OFFICE O/P EST MOD 30 MIN: CPT

## 2022-05-18 PROCEDURE — ? DUPIXENT MONITORING

## 2022-05-18 PROCEDURE — ? CHRONOLOGY OF PRESENT ILLNESS

## 2022-05-18 PROCEDURE — ? ORDER TESTS

## 2022-05-18 PROCEDURE — ? OTHER

## 2022-05-18 ASSESSMENT — LOCATION DETAILED DESCRIPTION DERM
LOCATION DETAILED: RIGHT VENTRAL PROXIMAL FOREARM
LOCATION DETAILED: RIGHT PUPIL
LOCATION DETAILED: LEFT RADIAL DORSAL HAND
LOCATION DETAILED: LEFT CAVUM CONCHA
LOCATION DETAILED: RIGHT CYMBA CONCHA
LOCATION DETAILED: LEFT VENTRAL PROXIMAL FOREARM
LOCATION DETAILED: LEFT INFERIOR CRUS OF ANTIHELIX
LOCATION DETAILED: RIGHT CAVUM CONCHA
LOCATION DETAILED: LEFT PUPIL
LOCATION DETAILED: LEFT MEDIAL FRONTAL SCALP
LOCATION DETAILED: LEFT AXILLARY VAULT
LOCATION DETAILED: RIGHT ULNAR DORSAL HAND

## 2022-05-18 ASSESSMENT — LOCATION SIMPLE DESCRIPTION DERM
LOCATION SIMPLE: RIGHT EYE
LOCATION SIMPLE: LEFT HAND
LOCATION SIMPLE: LEFT AXILLARY VAULT
LOCATION SIMPLE: LEFT EYE
LOCATION SIMPLE: LEFT FOREARM
LOCATION SIMPLE: RIGHT FOREARM
LOCATION SIMPLE: RIGHT HAND
LOCATION SIMPLE: RIGHT EAR
LOCATION SIMPLE: LEFT SCALP
LOCATION SIMPLE: LEFT EAR

## 2022-05-18 ASSESSMENT — LOCATION ZONE DERM
LOCATION ZONE: AXILLAE
LOCATION ZONE: ARM
LOCATION ZONE: CORNEA
LOCATION ZONE: SCALP
LOCATION ZONE: HAND
LOCATION ZONE: EAR

## 2022-05-18 ASSESSMENT — SEVERITY ASSESSMENT 2020: SEVERITY 2020: MODERATE

## 2022-05-18 ASSESSMENT — BSA ECZEMA: % BODY COVERED IN ECZEMA: 2

## 2022-05-18 NOTE — PROCEDURE: DUPIXENT MONITORING
Initial Cmp (Optional): 8/4/2020
Detail Level: Zone
Length Of Therapy: 1.5 years
Latest Cbc (Optional): 03/17/2021
Latest Cmp (Optional): 12/30/2021
Add High Risk Medication Management Associated Diagnosis?: No
Comments: Start 9/1/2020
Patient Reported Weight(Optional But Include Units): 215 lbs

## 2022-05-18 NOTE — PROCEDURE: OTHER
Render Risk Assessment In Note?: no
Note Text (......Xxx Chief Complaint.): This diagnosis correlates with the
Other (Free Text): Lab slip provided to patient today.
Detail Level: Simple

## 2022-05-18 NOTE — PROCEDURE: PRESCRIPTION MEDICATION MANAGEMENT
Render In Strict Bullet Format?: No
Detail Level: Zone
Initiate Treatment: Plan to start Rinvoq
Continue Regimen: Dupixent 300mg SC every 9-10 days (modified to 9-10 day dosage on 4/20/22)\\nSystane eye drops QD \\nOlux 0.05% foam BID PRN flares\\nKetoconazole 2% shampoo BIW-TIW\\nKetoconazole 2% cream BID to ears\\nAllegra QAM (continue)\\nClaritin QHS (continue)\\nProtopic ointment 0.1% QD-BID to ears\\nMimyx BID to ears\\nVytone cream BID PRN flares to ears
Plan: Patient should avoid all MCN related drugs. Patient previously discontinued Ximino 90mg 1 PO QD with dinner (due to DRESS syndrome) and Oracea 40mg 1 PO QD with dinner (due to possible cross reaction). Patient saw ophthalmologist and restarted doxycycline 50mg QD. Discussed low potential of cross reactivity with doxycycline. Discussed possible resistance development with antibiotic dosing (50mg), previously recommended switching to doxycycline 20mg BID. Doxy 20mg not covered by insurance, so patient is back on doxy 50mg 1 PO QD. Symptoms are stable.
Continue Regimen: Doxycycline 50mg 1 PO QD with food
Continue Regimen: Protopic 0.1% ointment BID \\nMimyx cream QD as barrier\\nOlux 0.05% foam BID PRN flares\\nVytone cream BID PRN flares

## 2022-05-18 NOTE — PROCEDURE: ORDER TESTS
Performing Laboratory: 0
Expected Date Of Service: 05/18/2022
Bill For Surgical Tray: no
Billing Type: Third-Party Bill

## 2022-05-18 NOTE — PROCEDURE: CHRONOLOGY OF PRESENT ILLNESS
Chronology Of Present Illness: 8/8/2019:  Rash, located on the right ear and scalp. The rash is scaly, red, and itchy. The rash has been present for years. She has arthritis. She is not currently on any medications. She was started on Olux foam and ketoconazole cream.\\n11/17/2019: scalp is improved, ears are not. 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 She was started on hydrocortisone butuyrate and ketoconazole for the underarms. Biopsy obtained (eczematous dermatitis)\\n1/3/2020: Axillae have improved. scalp and ears as well. Continue current topicals and Pramosone lotion was added for the underarms.\\n2/5/2020: drastic improvement of axillae. She discontinued Hydrocortisone cream and is only using ketoconazole cream. scalp and ears are stable. Start Elidel cream to ears. \\n3/25/2020: scalp and ears not improved. Discussed Dupixent with patient. Consider after Cn19. Continue current topicals.\\n5/27/2020: scalp still flaring. Punch bx obtained of neck. (folliculitis, but not c/w clinical symptoms and presentation. Favor eczematous derm/AD. Continue current topicals. \\n8/4/2020: rash not well controlled on topicals. Start Dupixent enrollment. IGA score 4. Full biologic labs obtained. RX Valtrex given.\\n9/1/2020: Dupixent start today. Recommend Systane eyedrops and Valtrex PRN as needed. \\n10/6/2020: Patient is somewhat improved, overall itching is better. Her ears are still very itchy. She is using Olux foam and ketoconazole cream to ears. She complains of new onset of joint pain above elbows and shoulders. She will see Dr. Dai and we will send letter.\\n11/3/2020: patient is improved overall. She still complains of itchy ears. Recommend talking to her audiologist to see if there are other hearing aids that are made of different material. RX Mimyx given for barrier cream. Continue Protopic 0.1% ointment QD PRN to ears and other topicals. Continue Dupixent injections. IGA score 1\\n12/9/2020: patient is much better. Ears are better. Hearing aid manufacturers told her that hearing aids are made out of medical grade hypoallergenic silicone. Itching has improved. Continue current topicals, antihistamines and Dupixent. Start Zonalon cream to ears for itching. Lab order given to patient today. Discussed that injection site reactions should subside.\\n3/17/21: eyes are very bothersome. Discussed possible component of rosacea. Rec warm rice sock compresses in addition to the Systane eye drops. For now continue Dupixent and topicals.\\n4/19/21: Eyes are still dry and bothersome. She states she will be seeing a dry eye specialist on Wednesday. She reports continued itching in the ears. Recommend not making too many changes to treatment regimen due to patient?s upcoming appt with dry eye specialist. Pramosone cream prescribed today for itching in ears, briefly discussed R/B/SE of gabapentin to consider starting at NOV if itching does not improve.\\n7/19/21: stable. Continue Dupixent Q 2 weeks and topicals. Recently diagnosed with gastroenteritis and possible sepsis. 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 Continue Dupixent and all topicals as needed.\\n1/19/22: patient is stable. Continue Dupixent and topicals PRN.\\n4/20/22: her itching (due to oak pollen) starts 3-4 days prior to next injection. Discussed Rinvoque with patient. Discussed it controls the itching better. Discussed risks, benefits and side effects with patient (increase in malignancies, cardio vascular events, blood clots, DVT - usually associated with prior underlying smoking history or other diseases). Discussed injecting Dupixent Q10 days vs Q14 days. Patient desires to continue Dupixent at shorter intervals. Consider adding on Tim inhibitor during environmental allergy season in the future.\\n5/18/22: Patient reports itching is minimally improved on modified Dupixent dosing (every 9-10 days). She reports worst areas are on the arms and inside ears. Re-discussed R/B/SE of Rinvoq in detail today. Patient reports worst 7/10 itch. Patient reports no personal hx of cancer or blood clots. She reports hx of migraines. Patient reports worsened eye dryness since starting Dupixent therapy. She is agreeable to start Rinvoq, insurance coverage check initiated today. Patient to obtain repeat full biologics labs, lab slip provided to patient to get drawn ASAP (MUST be fasting). She is due for her next Dupixent injection this Sunday. She has not been vaccinated for shingles, recommend getting shingles vaccine first ASAP, then inject Dupixent as scheduled on Sunday. F/u in 2 weeks. Sample saved for pt, plan to start therapy at NOV.
Detail Level: Zone

## 2022-05-31 ENCOUNTER — APPOINTMENT (RX ONLY)
Dept: URBAN - METROPOLITAN AREA CLINIC 125 | Facility: CLINIC | Age: 57
Setting detail: DERMATOLOGY
End: 2022-05-31

## 2022-05-31 DIAGNOSIS — Z79.899 OTHER LONG TERM (CURRENT) DRUG THERAPY: ICD-10-CM

## 2022-05-31 DIAGNOSIS — L259 CONTACT DERMATITIS AND OTHER ECZEMA, UNSPECIFIED CAUSE: ICD-10-CM

## 2022-05-31 DIAGNOSIS — L20.89 OTHER ATOPIC DERMATITIS: ICD-10-CM | Status: INADEQUATELY CONTROLLED

## 2022-05-31 DIAGNOSIS — L71.8 OTHER ROSACEA: ICD-10-CM

## 2022-05-31 PROBLEM — L23.9 ALLERGIC CONTACT DERMATITIS, UNSPECIFIED CAUSE: Status: ACTIVE | Noted: 2022-05-31

## 2022-05-31 PROCEDURE — ? COUNSELING

## 2022-05-31 PROCEDURE — ? ORDER TESTS

## 2022-05-31 PROCEDURE — ? PRESCRIPTION MEDICATION MANAGEMENT

## 2022-05-31 PROCEDURE — ? CHRONOLOGY OF PRESENT ILLNESS

## 2022-05-31 PROCEDURE — ? HIGH RISK MEDICATION MONITORING

## 2022-05-31 PROCEDURE — 99214 OFFICE O/P EST MOD 30 MIN: CPT

## 2022-05-31 PROCEDURE — ? ADDITIONAL NOTES

## 2022-05-31 PROCEDURE — ? OTHER

## 2022-05-31 PROCEDURE — ? RINVOQ INITIATION

## 2022-05-31 ASSESSMENT — LOCATION DETAILED DESCRIPTION DERM
LOCATION DETAILED: LEFT PUPIL
LOCATION DETAILED: LEFT VENTRAL PROXIMAL FOREARM
LOCATION DETAILED: RIGHT ULNAR DORSAL HAND
LOCATION DETAILED: LEFT MEDIAL FRONTAL SCALP
LOCATION DETAILED: LEFT INFERIOR CRUS OF ANTIHELIX
LOCATION DETAILED: RIGHT CAVUM CONCHA
LOCATION DETAILED: LEFT AXILLARY VAULT
LOCATION DETAILED: RIGHT CYMBA CONCHA
LOCATION DETAILED: RIGHT PUPIL
LOCATION DETAILED: RIGHT VENTRAL PROXIMAL FOREARM
LOCATION DETAILED: LEFT CAVUM CONCHA
LOCATION DETAILED: LEFT RADIAL DORSAL HAND

## 2022-05-31 ASSESSMENT — LOCATION SIMPLE DESCRIPTION DERM
LOCATION SIMPLE: LEFT AXILLARY VAULT
LOCATION SIMPLE: RIGHT EYE
LOCATION SIMPLE: LEFT HAND
LOCATION SIMPLE: RIGHT EAR
LOCATION SIMPLE: LEFT EAR
LOCATION SIMPLE: RIGHT HAND
LOCATION SIMPLE: LEFT SCALP
LOCATION SIMPLE: LEFT EYE
LOCATION SIMPLE: LEFT FOREARM
LOCATION SIMPLE: RIGHT FOREARM

## 2022-05-31 ASSESSMENT — SEVERITY ASSESSMENT 2020: SEVERITY 2020: MODERATE

## 2022-05-31 ASSESSMENT — LOCATION ZONE DERM
LOCATION ZONE: EAR
LOCATION ZONE: ARM
LOCATION ZONE: HAND
LOCATION ZONE: SCALP
LOCATION ZONE: CORNEA
LOCATION ZONE: AXILLAE

## 2022-05-31 ASSESSMENT — BSA ECZEMA: % BODY COVERED IN ECZEMA: 6

## 2022-05-31 NOTE — PROCEDURE: RINVOQ INITIATION
Is Methotrexate Contraindicated?: No
Initial Lipid Panel (Optional): 5/20/2022
Detail Level: Zone
Pregnancy And Lactation Warning Text: Based on animal studies, Rinvoq may cause embryo-fetal harm when administered to pregnant women.  The medication should not be used in pregnancy.  Breastfeeding is not recommended during treatment and for 6 days after the last dose.
Rinvoq Monitoring Guidelines: CBC, LFTs, lipids, hepatitis screening, TB screening and pregnancy tests should be checked prior to initiating Rinvoq therapy.  Labs may also be checked periodically after the initiation period.
Rinvoq Dosing: 15mg Daily
Diagnosis (Required): Atopic Dermatitis/Eczematous Dermatitis

## 2022-05-31 NOTE — PROCEDURE: PRESCRIPTION MEDICATION MANAGEMENT
Render In Strict Bullet Format?: No
Detail Level: Zone
Initiate Treatment: Rinvoq 15mg 1 PO QD (samples given today)
Continue Regimen: Systane eye drops QD \\nOlux 0.05% foam BID PRN flares\\nKetoconazole 2% shampoo BIW-TIW\\nKetoconazole 2% cream BID to ears\\nAllegra QAM (continue)\\nClaritin QHS (continue)\\nProtopic ointment 0.1% QD-BID to ears\\nMimyx BID to ears\\nVytone cream BID PRN flares to ears
Discontinue Regimen: Dupixent 300mg SC every 9-10 days (modified to 9-10 day dosage on 4/20/22; original start 9/1/2020)
Plan: Patient should avoid all MCN related drugs. Patient previously discontinued Ximino 90mg 1 PO QD with dinner (due to DRESS syndrome) and Oracea 40mg 1 PO QD with dinner (due to possible cross reaction). Patient saw ophthalmologist and restarted doxycycline 50mg QD. Discussed low potential of cross reactivity with doxycycline. Discussed possible resistance development with antibiotic dosing (50mg), previously recommended switching to doxycycline 20mg BID. Doxy 20mg not covered by insurance, so patient is back on doxy 50mg 1 PO QD. Symptoms are stable.
Continue Regimen: Doxycycline 50mg 1 PO QD with food
Continue Regimen: Protopic 0.1% ointment BID \\nMimyx cream QD as barrier\\nOlux 0.05% foam BID PRN flares\\nVytone cream BID PRN flares

## 2022-05-31 NOTE — PROCEDURE: ADDITIONAL NOTES
Render Risk Assessment In Note?: no
Detail Level: Simple
Additional Notes: IGA score today: 3\\nSCORAD index value today: 39 — 6 BSA (A variable) + 15 intensity (B variable) + 9 itchiness and 9 sleeplessness (C variable)

## 2022-05-31 NOTE — PROCEDURE: OTHER
Render Risk Assessment In Note?: no
Note Text (......Xxx Chief Complaint.): This diagnosis correlates with the
Other (Free Text): Hep/\\n\\nPatient to obtain repeat fasting labs prior to NOV in 1 mo, lab slip provided today.
Detail Level: Simple

## 2022-05-31 NOTE — PROCEDURE: ORDER TESTS
Performing Laboratory: 0
Expected Date Of Service: 05/31/2022
Bill For Surgical Tray: no
Billing Type: Third-Party Bill

## 2022-05-31 NOTE — PROCEDURE: CHRONOLOGY OF PRESENT ILLNESS
Chronology Of Present Illness: 8/8/2019:  Rash, located on the right ear and scalp. The rash is scaly, red, and itchy. The rash has been present for years. She has arthritis. She is not currently on any medications. She was started on Olux foam and ketoconazole cream.\\n11/17/2019: scalp is improved, ears are not. 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 She was started on hydrocortisone butuyrate and ketoconazole for the underarms. Biopsy obtained (eczematous dermatitis)\\n1/3/2020: Axillae have improved. scalp and ears as well. Continue current topicals and Pramosone lotion was added for the underarms.\\n2/5/2020: drastic improvement of axillae. She discontinued Hydrocortisone cream and is only using ketoconazole cream. scalp and ears are stable. Start Elidel cream to ears. \\n3/25/2020: scalp and ears not improved. Discussed Dupixent with patient. Consider after Cn19. Continue current topicals.\\n5/27/2020: scalp still flaring. Punch bx obtained of neck. (folliculitis, but not c/w clinical symptoms and presentation. Favor eczematous derm/AD. Continue current topicals. \\n8/4/2020: rash not well controlled on topicals. Start Dupixent enrollment. IGA score 4. Full biologic labs obtained. RX Valtrex given.\\n9/1/2020: Dupixent start today. Recommend Systane eyedrops and Valtrex PRN as needed. \\n10/6/2020: Patient is somewhat improved, overall itching is better. Her ears are still very itchy. She is using Olux foam and ketoconazole cream to ears. She complains of new onset of joint pain above elbows and shoulders. She will see Dr. Dai and we will send letter.\\n11/3/2020: patient is improved overall. She still complains of itchy ears. Recommend talking to her audiologist to see if there are other hearing aids that are made of different material. RX Mimyx given for barrier cream. Continue Protopic 0.1% ointment QD PRN to ears and other topicals. Continue Dupixent injections. IGA score 1\\n12/9/2020: patient is much better. Ears are better. Hearing aid manufacturers told her that hearing aids are made out of medical grade hypoallergenic silicone. Itching has improved. Continue current topicals, antihistamines and Dupixent. Start Zonalon cream to ears for itching. Lab order given to patient today. Discussed that injection site reactions should subside.\\n3/17/21: eyes are very bothersome. Discussed possible component of rosacea. Rec warm rice sock compresses in addition to the Systane eye drops. For now continue Dupixent and topicals.\\n4/19/21: Eyes are still dry and bothersome. She states she will be seeing a dry eye specialist on Wednesday. She reports continued itching in the ears. Recommend not making too many changes to treatment regimen due to patient?s upcoming appt with dry eye specialist. Pramosone cream prescribed today for itching in ears, briefly discussed R/B/SE of gabapentin to consider starting at [facility] if itching does not improve.\\n7/19/21: stable. Continue Dupixent Q 2 weeks and topicals. Recently diagnosed with gastroenteritis and possible sepsis. 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 Continue Dupixent and all topicals as needed.\\n1/19/22: patient is stable. Continue Dupixent and topicals PRN.\\n4/20/22: her itching (due to oak pollen) starts 3-4 days prior to next injection. Discussed Rinvoque with patient. Discussed it controls the itching better. Discussed risks, benefits and side effects with patient (increase in malignancies, cardio vascular events, blood clots, DVT - usually associated with prior underlying smoking history or other diseases). Discussed injecting Dupixent Q10 days vs Q14 days. Patient desires to continue Dupixent at shorter intervals. Consider adding on Tim inhibitor during environmental allergy season in the future.\\n5/18/22: Patient reports itching is minimally improved on modified Dupixent dosing (every 9-10 days). She reports worst areas are on the arms and inside ears. Re-discussed R/B/SE of Rinvoq in detail today. Patient reports worst 7/10 itch. Patient reports no personal hx of cancer or blood clots. She reports hx of migraines. Patient reports worsened eye dryness since starting Dupixent therapy. She is agreeable to start Rinvoq, insurance coverage check initiated today. Patient to obtain repeat full biologics labs, lab slip provided to patient to get drawn ASAP (MUST be fasting). She is due for her next Dupixent injection this Sunday. She has not been vaccinated for shingles, recommend getting shingles vaccine first ASAP, then inject Dupixent as scheduled on Sunday. F/u in 2 weeks. Sample saved for pt, plan to start therapy at N[facility].\\n5/31/22: Patient received shingles vaccine right after LOV. Last Dupixent injection 5/22/22. Patient is agreeable to start Rinvoq today. Rinvoq PA denied, will submit appeal with SCORAD score ([phone]). Symptoms are worsening, causing intense itchiness and sleeplessness. Start Rinvoq today, samples provided in office.
Detail Level: Zone

## 2022-05-31 NOTE — PROCEDURE: HIGH RISK MEDICATION MONITORING
Itraconazole Counseling:  I discussed with the patient the risks of itraconazole including but not limited to liver damage, nausea/vomiting, neuropathy, and severe allergy.  The patient understands that this medication is best absorbed when taken with acidic beverages such as non-diet cola or ginger ale.  The patient understands that monitoring is required including baseline LFTs and repeat LFTs at intervals.  The patient understands that they are to contact us or the primary physician if concerning signs are noted.
Azithromycin Counseling:  I discussed with the patient the risks of azithromycin including but not limited to GI upset, allergic reaction, drug rash, diarrhea, and yeast infections.
Ivermectin Pregnancy And Lactation Text: This medication is Pregnancy Category C and it isn't known if it is safe during pregnancy. It is also excreted in breast milk.
Infliximab Pregnancy And Lactation Text: This medication is Pregnancy Category B and is considered safe during pregnancy. It is unknown if this medication is excreted in breast milk.
Xolair Counseling:  Patient informed of potential adverse effects including but not limited to fever, muscle aches, rash and allergic reactions.  The patient verbalized understanding of the proper use and possible adverse effects of Xolair.  All of the patient's questions and concerns were addressed.
Bexarotene Counseling:  I discussed with the patient the risks of bexarotene including but not limited to hair loss, dry lips/skin/eyes, liver abnormalities, hyperlipidemia, pancreatitis, depression/suicidal ideation, photosensitivity, drug rash/allergic reactions, hypothyroidism, anemia, leukopenia, infection, cataracts, and teratogenicity.  Patient understands that they will need regular blood tests to check lipid profile, liver function tests, white blood cell count, thyroid function tests and pregnancy test if applicable.
Hydroxychloroquine Pregnancy And Lactation Text: This medication has been shown to cause fetal harm but it isn't assigned a Pregnancy Risk Category. There are small amounts excreted in breast milk.
Xolair Pregnancy And Lactation Text: This medication is Pregnancy Category B and is considered safe during pregnancy. This medication is excreted in breast milk.
Nsaids Counseling: NSAID Counseling: I discussed with the patient that NSAIDs should be taken with food. Prolonged use of NSAIDs can result in the development of stomach ulcers.  Patient advised to stop taking NSAIDs if abdominal pain occurs.  The patient verbalized understanding of the proper use and possible adverse effects of NSAIDs.  All of the patient's questions and concerns were addressed.
Valtrex Pregnancy And Lactation Text: this medication is Pregnancy Category B and is considered safe during pregnancy. This medication is not directly found in breast milk but it's metabolite acyclovir is present.
Eucrisa Pregnancy And Lactation Text: This medication has not been assigned a Pregnancy Risk Category but animal studies failed to show danger with the topical medication. It is unknown if the medication is excreted in breast milk.
Quinolones Pregnancy And Lactation Text: This medication is Pregnancy Category C and it isn't know if it is safe during pregnancy. It is also excreted in breast milk.
Rituxan Counseling:  I discussed with the patient the risks of Rituxan infusions. Side effects can include infusion reactions, severe drug rashes including mucocutaneous reactions, reactivation of latent hepatitis and other infections and rarely progressive multifocal leukoencephalopathy.  All of the patient's questions and concerns were addressed.
Topical Clindamycin Pregnancy And Lactation Text: This medication is Pregnancy Category B and is considered safe during pregnancy. It is unknown if it is excreted in breast milk.
Bexarotene Pregnancy And Lactation Text: This medication is Pregnancy Category X and should not be given to women who are pregnant or may become pregnant. This medication should not be used if you are breast feeding.
Nsaids Pregnancy And Lactation Text: These medications are considered safe up to 30 weeks gestation. It is excreted in breast milk.
Topical Sulfur Applications Counseling: Topical Sulfur Counseling: Patient counseled that this medication may cause skin irritation or allergic reactions.  In the event of skin irritation, the patient was advised to reduce the amount of the drug applied or use it less frequently.   The patient verbalized understanding of the proper use and possible adverse effects of topical sulfur application.  All of the patient's questions and concerns were addressed.
Rifampin Counseling: I discussed with the patient the risks of rifampin including but not limited to liver damage, kidney damage, red-orange body fluids, nausea/vomiting and severe allergy.
Azithromycin Pregnancy And Lactation Text: This medication is considered safe during pregnancy and is also secreted in breast milk.
Hydroquinone Counseling:  Patient advised that medication may result in skin irritation, lightening (hypopigmentation), dryness, and burning.  In the event of skin irritation, the patient was advised to reduce the amount of the drug applied or use it less frequently.  Rarely, spots that are treated with hydroquinone can become darker (pseudoochronosis).  Should this occur, patient instructed to stop medication and call the office. The patient verbalized understanding of the proper use and possible adverse effects of hydroquinone.  All of the patient's questions and concerns were addressed.
Bactrim Counseling:  I discussed with the patient the risks of sulfa antibiotics including but not limited to GI upset, allergic reaction, drug rash, diarrhea, dizziness, photosensitivity, and yeast infections.  Rarely, more serious reactions can occur including but not limited to aplastic anemia, agranulocytosis, methemoglobinemia, blood dyscrasias, liver or kidney failure, lung infiltrates or desquamative/blistering drug rashes.
Ketoconazole Counseling:   Patient counseled regarding improving absorption with orange juice.  Adverse effects include but are not limited to breast enlargement, headache, diarrhea, nausea, upset stomach, liver function test abnormalities, taste disturbance, and stomach pain.  There is a rare possibility of liver failure that can occur when taking ketoconazole. The patient understands that monitoring of LFTs may be required, especially at baseline. The patient verbalized understanding of the proper use and possible adverse effects of ketoconazole.  All of the patient's questions and concerns were addressed.
Isotretinoin Counseling: Patient should get monthly blood tests, not donate blood, not drive at night if vision affected, not share medication, and not undergo elective surgery for 6 months after tx completed. Side effects reviewed, pt to contact office should one occur.
Use Enhanced Medication Counseling?: No
Odomzo Counseling- I discussed with the patient the risks of Odomzo including but not limited to nausea, vomiting, diarrhea, constipation, weight loss, changes in the sense of taste, decreased appetite, muscle spasms, and hair loss.  The patient verbalized understanding of the proper use and possible adverse effects of Odomzo.  All of the patient's questions and concerns were addressed.
Topical Sulfur Applications Pregnancy And Lactation Text: This medication is Pregnancy Category C and has an unknown safety profile during pregnancy. It is unknown if this topical medication is excreted in breast milk.
Rituxan Pregnancy And Lactation Text: This medication is Pregnancy Category C and it isn't know if it is safe during pregnancy. It is unknown if this medication is excreted in breast milk but similar antibodies are known to be excreted.
Azathioprine Counseling:  I discussed with the patient the risks of azathioprine including but not limited to myelosuppression, immunosuppression, hepatotoxicity, lymphoma, and infections.  The patient understands that monitoring is required including baseline LFTs, Creatinine, possible TPMP genotyping and weekly CBCs for the first month and then every 2 weeks thereafter.  The patient verbalized understanding of the proper use and possible adverse effects of azathioprine.  All of the patient's questions and concerns were addressed.
Azathioprine Pregnancy And Lactation Text: This medication is Pregnancy Category D and isn't considered safe during pregnancy. It is unknown if this medication is excreted in breast milk.
Ketoconazole Pregnancy And Lactation Text: This medication is Pregnancy Category C and it isn't know if it is safe during pregnancy. It is also excreted in breast milk and breast feeding isn't recommended.
Siliq Counseling:  I discussed with the patient the risks of Siliq including but not limited to new or worsening depression, suicidal thoughts and behavior, immunosuppression, malignancy, posterior leukoencephalopathy syndrome, and serious infections.  The patient understands that monitoring is required including a PPD at baseline and must alert us or the primary physician if symptoms of infection or other concerning signs are noted. There is also a special program designed to monitor depression which is required with Siliq.
Bactrim Pregnancy And Lactation Text: This medication is Pregnancy Category D and is known to cause fetal risk.  It is also excreted in breast milk.
Isotretinoin Pregnancy And Lactation Text: This medication is Pregnancy Category X and is considered extremely dangerous during pregnancy. It is unknown if it is excreted in breast milk.
High Dose Vitamin A Counseling: Side effects reviewed, pt to contact office should one occur.
Odomzo Pregnancy And Lactation Text: This medication is Pregnancy Category X and is absolutely contraindicated during pregnancy. It is unknown if it is excreted in breast milk.
Zyclara Counseling:  I discussed with the patient the risks of imiquimod including but not limited to erythema, scaling, itching, weeping, crusting, and pain.  Patient understands that the inflammatory response to imiquimod is variable from person to person and was educated regarded proper titration schedule.  If flu-like symptoms develop, patient knows to discontinue the medication and contact us.
Imiquimod Counseling:  I discussed with the patient the risks of imiquimod including but not limited to erythema, scaling, itching, weeping, crusting, and pain.  Patient understands that the inflammatory response to imiquimod is variable from person to person and was educated regarded proper titration schedule.  If flu-like symptoms develop, patient knows to discontinue the medication and contact us.
Imiquimod Pregnancy And Lactation Text: This medication is Pregnancy Category C. It is unknown if this medication is excreted in breast milk.
Cephalexin Counseling: I counseled the patient regarding use of cephalexin as an antibiotic for prophylactic and/or therapeutic purposes. Cephalexin (commonly prescribed under brand name Keflex) is a cephalosporin antibiotic which is active against numerous classes of bacteria, including most skin bacteria. Side effects may include nausea, diarrhea, gastrointestinal upset, rash, hives, yeast infections, and in rare cases, hepatitis, kidney disease, seizures, fever, confusion, neurologic symptoms, and others. Patients with severe allergies to penicillin medications are cautioned that there is about a 10% incidence of cross-reactivity with cephalosporins. When possible, patients with penicillin allergies should use alternatives to cephalosporins for antibiotic therapy.
Siliq Pregnancy And Lactation Text: The risk during pregnancy and breastfeeding is uncertain with this medication.
Terbinafine Counseling: Patient counseling regarding adverse effects of terbinafine including but not limited to headache, diarrhea, rash, upset stomach, liver function test abnormalities, itching, taste/smell disturbance, nausea, abdominal pain, and flatulence.  There is a rare possibility of liver failure that can occur when taking terbinafine.  The patient understands that a baseline LFT and kidney function test may be required. The patient verbalized understanding of the proper use and possible adverse effects of terbinafine.  All of the patient's questions and concerns were addressed.
Cellcept Counseling:  I discussed with the patient the risks of mycophenolate mofetil including but not limited to infection/immunosuppression, GI upset, hypokalemia, hypercholesterolemia, bone marrow suppression, lymphoproliferative disorders, malignancy, GI ulceration/bleed/perforation, colitis, interstitial lung disease, kidney failure, progressive multifocal leukoencephalopathy, and birth defects.  The patient understands that monitoring is required including a baseline creatinine and regular CBC testing. In addition, patient must alert us immediately if symptoms of infection or other concerning signs are noted.
High Dose Vitamin A Pregnancy And Lactation Text: High dose vitamin A therapy is contraindicated during pregnancy and breast feeding.
Cimzia Counseling:  I discussed with the patient the risks of Cimzia including but not limited to immunosuppression, allergic reactions and infections.  The patient understands that monitoring is required including a PPD at baseline and must alert us or the primary physician if symptoms of infection or other concerning signs are noted.
Otezla Counseling: The side effects of Otezla were discussed with the patient, including but not limited to worsening or new depression, weight loss, diarrhea, nausea, upper respiratory tract infection, and headache. Patient instructed to call the office should any adverse effect occur.  The patient verbalized understanding of the proper use and possible adverse effects of Otezla.  All the patient's questions and concerns were addressed.
Terbinafine Pregnancy And Lactation Text: This medication is Pregnancy Category B and is considered safe during pregnancy. It is also excreted in breast milk and breast feeding isn't recommended.
Otezla Pregnancy And Lactation Text: This medication is Pregnancy Category C and it isn't known if it is safe during pregnancy. It is unknown if it is excreted in breast milk.
Minoxidil Counseling: Minoxidil is a topical medication which can increase blood flow where it is applied. It is uncertain how this medication increases hair growth. Side effects are uncommon and include stinging and allergic reactions.
Detail Level: Generalized
Simponi Counseling:  I discussed with the patient the risks of golimumab including but not limited to myelosuppression, immunosuppression, autoimmune hepatitis, demyelinating diseases, lymphoma, and serious infections.  The patient understands that monitoring is required including a PPD at baseline and must alert us or the primary physician if symptoms of infection or other concerning signs are noted.
Cephalexin Pregnancy And Lactation Text: This medication is Pregnancy Category B and considered safe during pregnancy.  It is also excreted in breast milk but can be used safely for shorter doses.
Cimzia Pregnancy And Lactation Text: This medication crosses the placenta but can be considered safe in certain situations. Cimzia may be excreted in breast milk.
Oxybutynin Counseling:  I discussed with the patient the risks of oxybutynin including but not limited to skin rash, drowsiness, dry mouth, difficulty urinating, and blurred vision.
Cyclophosphamide Counseling:  I discussed with the patient the risks of cyclophosphamide including but not limited to hair loss, hormonal abnormalities, decreased fertility, abdominal pain, diarrhea, nausea and vomiting, bone marrow suppression and infection. The patient understands that monitoring is required while taking this medication.
Clindamycin Counseling: I counseled the patient regarding use of clindamycin as an antibiotic for prophylactic and/or therapeutic purposes. Clindamycin is active against numerous classes of bacteria, including skin bacteria. Side effects may include nausea, diarrhea, gastrointestinal upset, rash, hives, yeast infections, and in rare cases, colitis.
Cimetidine Counseling:  I discussed with the patient the risks of Cimetidine including but not limited to gynecomastia, headache, diarrhea, nausea, drowsiness, arrhythmias, pancreatitis, skin rashes, psychosis, bone marrow suppression and kidney toxicity.
Benzoyl Peroxide Counseling: Patient counseled that medicine may cause skin irritation and bleach clothing.  In the event of skin irritation, the patient was advised to reduce the amount of the drug applied or use it less frequently.   The patient verbalized understanding of the proper use and possible adverse effects of benzoyl peroxide.  All of the patient's questions and concerns were addressed.
Picato Counseling:  I discussed with the patient the risks of Picato including but not limited to erythema, scaling, itching, weeping, crusting, and pain.
Rifampin Pregnancy And Lactation Text: This medication is Pregnancy Category C and it isn't know if it is safe during pregnancy. It is also excreted in breast milk and should not be used if you are breast feeding.
Clindamycin Pregnancy And Lactation Text: This medication can be used in pregnancy if certain situations. Clindamycin is also present in breast milk.
Arava Counseling:  Patient counseled regarding adverse effects of Arava including but not limited to nausea, vomiting, abnormalities in liver function tests. Patients may develop mouth sores, rash, diarrhea, and abnormalities in blood counts. The patient understands that monitoring is required including LFTs and blood counts.  There is a rare possibility of scarring of the liver and lung problems that can occur when taking methotrexate. Persistent nausea, loss of appetite, pale stools, dark urine, cough, and shortness of breath should be reported immediately. Patient advised to discontinue Arava treatment and consult with a physician prior to attempting conception. The patient will have to undergo a treatment to eliminate Arava from the body prior to conception.
Cosentyx Counseling:  I discussed with the patient the risks of Cosentyx including but not limited to worsening of Crohn's disease, immunosuppression, allergic reactions and infections.  The patient understands that monitoring is required including a PPD at baseline and must alert us or the primary physician if symptoms of infection or other concerning signs are noted.
Cyclophosphamide Pregnancy And Lactation Text: This medication is Pregnancy Category D and it isn't considered safe during pregnancy. This medication is excreted in breast milk.
Skyrizi Counseling: I discussed with the patient the risks of risankizumab-rzaa including but not limited to immunosuppression, and serious infections.  The patient understands that monitoring is required including a PPD at baseline and must alert us or the primary physician if symptoms of infection or other concerning signs are noted.
Cyclosporine Counseling:  I discussed with the patient the risks of cyclosporine including but not limited to hypertension, gingival hyperplasia,myelosuppression, immunosuppression, liver damage, kidney damage, neurotoxicity, lymphoma, and serious infections. The patient understands that monitoring is required including baseline blood pressure, CBC, CMP, lipid panel and uric acid, and then 1-2 times monthly CMP and blood pressure.
Dapsone Counseling: I discussed with the patient the risks of dapsone including but not limited to hemolytic anemia, agranulocytosis, rashes, methemoglobinemia, kidney failure, peripheral neuropathy, headaches, GI upset, and liver toxicity.  Patients who start dapsone require monitoring including baseline LFTs and weekly CBCs for the first month, then every month thereafter.  The patient verbalized understanding of the proper use and possible adverse effects of dapsone.  All of the patient's questions and concerns were addressed.
Sarecycline Counseling: Patient advised regarding possible photosensitivity and discoloration of the teeth, skin, lips, tongue and gums.  Patient instructed to avoid sunlight, if possible.  When exposed to sunlight, patients should wear protective clothing, sunglasses, and sunscreen.  The patient was instructed to call the office immediately if the following severe adverse effects occur:  hearing changes, easy bruising/bleeding, severe headache, or vision changes.  The patient verbalized understanding of the proper use and possible adverse effects of sarecycline.  All of the patient's questions and concerns were addressed.
Benzoyl Peroxide Pregnancy And Lactation Text: This medication is Pregnancy Category C. It is unknown if benzoyl peroxide is excreted in breast milk.
Carac Counseling:  I discussed with the patient the risks of Carac including but not limited to erythema, scaling, itching, weeping, crusting, and pain.
Birth Control Pills Counseling: Birth Control Pill Counseling: I discussed with the patient the potential side effects of OCPs including but not limited to increased risk of stroke, heart attack, thrombophlebitis, deep venous thrombosis, hepatic adenomas, breast changes, GI upset, headaches, and depression.  The patient verbalized understanding of the proper use and possible adverse effects of OCPs. All of the patient's questions and concerns were addressed.
Dupixent Counseling: I discussed with the patient the risks of dupilumab including but not limited to eye infection and irritation, cold sores, injection site reactions, worsening of asthma, allergic reactions and increased risk of parasitic infection.  Live vaccines should be avoided while taking dupilumab. Dupilumab will also interact with certain medications such as warfarin and cyclosporine. The patient understands that monitoring is required and they must alert us or the primary physician if symptoms of infection or other concerning signs are noted.
Doxycycline Counseling:  Patient counseled regarding possible photosensitivity and increased risk for sunburn.  Patient instructed to avoid sunlight, if possible.  When exposed to sunlight, patients should wear protective clothing, sunglasses, and sunscreen.  The patient was instructed to call the office immediately if the following severe adverse effects occur:  hearing changes, easy bruising/bleeding, severe headache, or vision changes.  The patient verbalized understanding of the proper use and possible adverse effects of doxycycline.  All of the patient's questions and concerns were addressed.
Clofazimine Counseling:  I discussed with the patient the risks of clofazimine including but not limited to skin and eye pigmentation, liver damage, nausea/vomiting, gastrointestinal bleeding and allergy.
Protopic Counseling: Patient may experience a mild burning sensation during topical application. Protopic is not approved in children less than 2 years of age. There have been case reports of hematologic and skin malignancies in patients using topical calcineurin inhibitors although causality is questionable.
Sarecycline Pregnancy And Lactation Text: This medication is Pregnancy Category D and not consider safe during pregnancy. It is also excreted in breast milk.
Doxycycline Pregnancy And Lactation Text: This medication is Pregnancy Category D and not consider safe during pregnancy. It is also excreted in breast milk but is considered safe for shorter treatment courses.
Stelara Counseling:  I discussed with the patient the risks of ustekinumab including but not limited to immunosuppression, malignancy, posterior leukoencephalopathy syndrome, and serious infections.  The patient understands that monitoring is required including a PPD at baseline and must alert us or the primary physician if symptoms of infection or other concerning signs are noted.
Dapsone Pregnancy And Lactation Text: This medication is Pregnancy Category C and is not considered safe during pregnancy or breast feeding.
Clofazimine Pregnancy And Lactation Text: This medication is Pregnancy Category C and isn't considered safe during pregnancy. It is excreted in breast milk.
Dupixent Pregnancy And Lactation Text: This medication likely crosses the placenta but the risk for the fetus is uncertain. This medication is excreted in breast milk.
Carac Pregnancy And Lactation Text: This medication is Pregnancy Category X and contraindicated in pregnancy and in women who may become pregnant. It is unknown if this medication is excreted in breast milk.
Cyclosporine Pregnancy And Lactation Text: This medication is Pregnancy Category C and it isn't know if it is safe during pregnancy. This medication is excreted in breast milk.
Doxepin Counseling:  Patient advised that the medication is sedating and not to drive a car after taking this medication. Patient informed of potential adverse effects including but not limited to dry mouth, urinary retention, and blurry vision.  The patient verbalized understanding of the proper use and possible adverse effects of doxepin.  All of the patient's questions and concerns were addressed.
Birth Control Pills Pregnancy And Lactation Text: This medication should be avoided if pregnant and for the first 30 days post-partum.
Tetracycline Counseling: Patient counseled regarding possible photosensitivity and increased risk for sunburn.  Patient instructed to avoid sunlight, if possible.  When exposed to sunlight, patients should wear protective clothing, sunglasses, and sunscreen.  The patient was instructed to call the office immediately if the following severe adverse effects occur:  hearing changes, easy bruising/bleeding, severe headache, or vision changes.  The patient verbalized understanding of the proper use and possible adverse effects of tetracycline.  All of the patient's questions and concerns were addressed. Patient understands to avoid pregnancy while on therapy due to potential birth defects.
Doxepin Pregnancy And Lactation Text: This medication is Pregnancy Category C and it isn't known if it is safe during pregnancy. It is also excreted in breast milk and breast feeding isn't recommended.
Spironolactone Counseling: Patient advised regarding risks of diarrhea, abdominal pain, hyperkalemia, birth defects (for female patients), liver toxicity and renal toxicity. The patient may need blood work to monitor liver and kidney function and potassium levels while on therapy. The patient verbalized understanding of the proper use and possible adverse effects of spironolactone.  All of the patient's questions and concerns were addressed.
Erivedge Counseling- I discussed with the patient the risks of Erivedge including but not limited to nausea, vomiting, diarrhea, constipation, weight loss, changes in the sense of taste, decreased appetite, muscle spasms, and hair loss.  The patient verbalized understanding of the proper use and possible adverse effects of Erivedge.  All of the patient's questions and concerns were addressed.
Protopic Pregnancy And Lactation Text: This medication is Pregnancy Category C. It is unknown if this medication is excreted in breast milk when applied topically.
Taltz Counseling: I discussed with the patient the risks of ixekizumab including but not limited to immunosuppression, serious infections, worsening of inflammatory bowel disease and drug reactions.  The patient understands that monitoring is required including a PPD at baseline and must alert us or the primary physician if symptoms of infection or other concerning signs are noted.
5-Fu Counseling: 5-Fluorouracil Counseling:  I discussed with the patient the risks of 5-fluorouracil including but not limited to erythema, scaling, itching, weeping, crusting, and pain.
Enbrel Counseling:  I discussed with the patient the risks of etanercept including but not limited to myelosuppression, immunosuppression, autoimmune hepatitis, demyelinating diseases, lymphoma, and infections.  The patient understands that monitoring is required including a PPD at baseline and must alert us or the primary physician if symptoms of infection or other concerning signs are noted.
Colchicine Counseling:  Patient counseled regarding adverse effects including but not limited to stomach upset (nausea, vomiting, stomach pain, or diarrhea).  Patient instructed to limit alcohol consumption while taking this medication.  Colchicine may reduce blood counts especially with prolonged use.  The patient understands that monitoring of kidney function and blood counts may be required, especially at baseline. The patient verbalized understanding of the proper use and possible adverse effects of colchicine.  All of the patient's questions and concerns were addressed.
Methotrexate Counseling:  Patient counseled regarding adverse effects of methotrexate including but not limited to nausea, vomiting, abnormalities in liver function tests. Patients may develop mouth sores, rash, diarrhea, and abnormalities in blood counts. The patient understands that monitoring is required including LFT's and blood counts.  There is a rare possibility of scarring of the liver and lung problems that can occur when taking methotrexate. Persistent nausea, loss of appetite, pale stools, dark urine, cough, and shortness of breath should be reported immediately. Patient advised to discontinue methotrexate treatment at least three months before attempting to become pregnant.  I discussed the need for folate supplements while taking methotrexate.  These supplements can decrease side effects during methotrexate treatment. The patient verbalized understanding of the proper use and possible adverse effects of methotrexate.  All of the patient's questions and concerns were addressed.
Erythromycin Counseling:  I discussed with the patient the risks of erythromycin including but not limited to GI upset, allergic reaction, drug rash, diarrhea, increase in liver enzymes, and yeast infections.
Spironolactone Pregnancy And Lactation Text: This medication can cause feminization of the male fetus and should be avoided during pregnancy. The active metabolite is also found in breast milk.
Erythromycin Pregnancy And Lactation Text: This medication is Pregnancy Category B and is considered safe during pregnancy. It is also excreted in breast milk.
Methotrexate Pregnancy And Lactation Text: This medication is Pregnancy Category X and is known to cause fetal harm. This medication is excreted in breast milk.
Hydroxyzine Counseling: Patient advised that the medication is sedating and not to drive a car after taking this medication.  Patient informed of potential adverse effects including but not limited to dry mouth, urinary retention, and blurry vision.  The patient verbalized understanding of the proper use and possible adverse effects of hydroxyzine.  All of the patient's questions and concerns were addressed.
Solaraze Counseling:  I discussed with the patient the risks of Solaraze including but not limited to erythema, scaling, itching, weeping, crusting, and pain.
Solaraze Pregnancy And Lactation Text: This medication is Pregnancy Category B and is considered safe. There is some data to suggest avoiding during the third trimester. It is unknown if this medication is excreted in breast milk.
Hydroxyzine Pregnancy And Lactation Text: This medication is not safe during pregnancy and should not be taken. It is also excreted in breast milk and breast feeding isn't recommended.
Metronidazole Counseling:  I discussed with the patient the risks of metronidazole including but not limited to seizures, nausea/vomiting, a metallic taste in the mouth, nausea/vomiting and severe allergy.
Humira Counseling:  I discussed with the patient the risks of adalimumab including but not limited to myelosuppression, immunosuppression, autoimmune hepatitis, demyelinating diseases, lymphoma, and serious infections.  The patient understands that monitoring is required including a PPD at baseline and must alert us or the primary physician if symptoms of infection or other concerning signs are noted.
Prednisone Counseling:  I discussed with the patient the risks of prolonged use of prednisone including but not limited to weight gain, insomnia, osteoporosis, mood changes, diabetes, susceptibility to infection, glaucoma and high blood pressure.  In cases where prednisone use is prolonged, patients should be monitored with blood pressure checks, serum glucose levels and an eye exam.  Additionally, the patient may need to be placed on GI prophylaxis, PCP prophylaxis, and calcium and vitamin D supplementation and/or a bisphosphonate.  The patient verbalized understanding of the proper use and the possible adverse effects of prednisone.  All of the patient's questions and concerns were addressed.
Gabapentin Counseling: I discussed with the patient the risks of gabapentin including but not limited to dizziness, somnolence, fatigue and ataxia.
Tremfya Counseling: I discussed with the patient the risks of guselkumab including but not limited to immunosuppression, serious infections, worsening of inflammatory bowel disease and drug reactions.  The patient understands that monitoring is required including a PPD at baseline and must alert us or the primary physician if symptoms of infection or other concerning signs are noted.
SSKI Counseling:  I discussed with the patient the risks of SSKI including but not limited to thyroid abnormalities, metallic taste, GI upset, fever, headache, acne, arthralgias, paraesthesias, lymphadenopathy, easy bleeding, arrhythmias, and allergic reaction.
Drysol Counseling:  I discussed with the patient the risks of drysol/aluminum chloride including but not limited to skin rash, itching, irritation, burning.
Drysol Pregnancy And Lactation Text: This medication is considered safe during pregnancy and breast feeding.
Sski Pregnancy And Lactation Text: This medication is Pregnancy Category D and isn't considered safe during pregnancy. It is excreted in breast milk.
Topical Retinoid counseling:  Patient advised to apply a pea-sized amount only at bedtime and wait 30 minutes after washing their face before applying.  If too drying, patient may add a non-comedogenic moisturizer. The patient verbalized understanding of the proper use and possible adverse effects of retinoids.  All of the patient's questions and concerns were addressed.
Fluconazole Counseling:  Patient counseled regarding adverse effects of fluconazole including but not limited to headache, diarrhea, nausea, upset stomach, liver function test abnormalities, taste disturbance, and stomach pain.  There is a rare possibility of liver failure that can occur when taking fluconazole.  The patient understands that monitoring of LFTs and kidney function test may be required, especially at baseline. The patient verbalized understanding of the proper use and possible adverse effects of fluconazole.  All of the patient's questions and concerns were addressed.
Metronidazole Pregnancy And Lactation Text: This medication is Pregnancy Category B and considered safe during pregnancy.  It is also excreted in breast milk.
Glycopyrrolate Counseling:  I discussed with the patient the risks of glycopyrrolate including but not limited to skin rash, drowsiness, dry mouth, difficulty urinating, and blurred vision.
Minocycline Counseling: Patient advised regarding possible photosensitivity and discoloration of the teeth, skin, lips, tongue and gums.  Patient instructed to avoid sunlight, if possible.  When exposed to sunlight, patients should wear protective clothing, sunglasses, and sunscreen.  The patient was instructed to call the office immediately if the following severe adverse effects occur:  hearing changes, easy bruising/bleeding, severe headache, or vision changes.  The patient verbalized understanding of the proper use and possible adverse effects of minocycline.  All of the patient's questions and concerns were addressed.
Elidel Counseling: Patient may experience a mild burning sensation during topical application. Elidel is not approved in children less than 2 years of age. There have been case reports of hematologic and skin malignancies in patients using topical calcineurin inhibitors although causality is questionable.
Albendazole Counseling:  I discussed with the patient the risks of albendazole including but not limited to cytopenia, kidney damage, nausea/vomiting and severe allergy.  The patient understands that this medication is being used in an off-label manner.
Thalidomide Counseling: I discussed with the patient the risks of thalidomide including but not limited to birth defects, anxiety, weakness, chest pain, dizziness, cough and severe allergy.
Tazorac Counseling:  Patient advised that medication is irritating and drying.  Patient may need to apply sparingly and wash off after an hour before eventually leaving it on overnight.  The patient verbalized understanding of the proper use and possible adverse effects of tazorac.  All of the patient's questions and concerns were addressed.
Glycopyrrolate Pregnancy And Lactation Text: This medication is Pregnancy Category B and is considered safe during pregnancy. It is unknown if it is excreted breast milk.
Ilumya Counseling: I discussed with the patient the risks of tildrakizumab including but not limited to immunosuppression, malignancy, posterior leukoencephalopathy syndrome, and serious infections.  The patient understands that monitoring is required including a PPD at baseline and must alert us or the primary physician if symptoms of infection or other concerning signs are noted.
Xeljanz Counseling: I discussed with the patient the risks of Xeljanz therapy including increased risk of infection, liver issues, headache, diarrhea, or cold symptoms. Live vaccines should be avoided. They were instructed to call if they have any problems.
Xelnickyz Pregnancy And Lactation Text: This medication is Pregnancy Category D and is not considered safe during pregnancy.  The risk during breast feeding is also uncertain.
Acitretin Counseling:  I discussed with the patient the risks of acitretin including but not limited to hair loss, dry lips/skin/eyes, liver damage, hyperlipidemia, depression/suicidal ideation, photosensitivity.  Serious rare side effects can include but are not limited to pancreatitis, pseudotumor cerebri, bony changes, clot formation/stroke/heart attack.  Patient understands that alcohol is contraindicated since it can result in liver toxicity and significantly prolong the elimination of the drug by many years.
Griseofulvin Counseling:  I discussed with the patient the risks of griseofulvin including but not limited to photosensitivity, cytopenia, liver damage, nausea/vomiting and severe allergy.  The patient understands that this medication is best absorbed when taken with a fatty meal (e.g., ice cream or french fries).
Hydroxychloroquine Counseling:  I discussed with the patient that a baseline ophthalmologic exam is needed at the start of therapy and every year thereafter while on therapy. A CBC may also be warranted for monitoring.  The side effects of this medication were discussed with the patient, including but not limited to agranulocytosis, aplastic anemia, seizures, rashes, retinopathy, and liver toxicity. Patient instructed to call the office should any adverse effect occur.  The patient verbalized understanding of the proper use and possible adverse effects of Plaquenil.  All the patient's questions and concerns were addressed.
Acitretin Pregnancy And Lactation Text: This medication is Pregnancy Category X and should not be given to women who are pregnant or may become pregnant in the future. This medication is excreted in breast milk.
Valtrex Counseling: I discussed with the patient the risks of valacyclovir including but not limited to kidney damage, nausea, vomiting and severe allergy.  The patient understands that if the infection seems to be worsening or is not improving, they are to call.
Ivermectin Counseling:  Patient instructed to take medication on an empty stomach with a full glass of water.  Patient informed of potential adverse effects including but not limited to nausea, diarrhea, dizziness, itching, and swelling of the extremities or lymph nodes.  The patient verbalized understanding of the proper use and possible adverse effects of ivermectin.  All of the patient's questions and concerns were addressed.
Tazorac Pregnancy And Lactation Text: This medication is not safe during pregnancy. It is unknown if this medication is excreted in breast milk.
Topical Clindamycin Counseling: Patient counseled that this medication may cause skin irritation or allergic reactions.  In the event of skin irritation, the patient was advised to reduce the amount of the drug applied or use it less frequently.   The patient verbalized understanding of the proper use and possible adverse effects of clindamycin.  All of the patient's questions and concerns were addressed.
Eucrisa Counseling: Patient may experience a mild burning sensation during topical application. Eucrisa is not approved in children less than 2 years of age.
Quinolones Counseling:  I discussed with the patient the risks of fluoroquinolones including but not limited to GI upset, allergic reaction, drug rash, diarrhea, dizziness, photosensitivity, yeast infections, liver function test abnormalities, tendonitis/tendon rupture.
Infliximab Counseling:  I discussed with the patient the risks of infliximab including but not limited to myelosuppression, immunosuppression, autoimmune hepatitis, demyelinating diseases, lymphoma, and serious infections.  The patient understands that monitoring is required including a PPD at baseline and must alert us or the primary physician if symptoms of infection or other concerning signs are noted.
Griseofulvin Pregnancy And Lactation Text: This medication is Pregnancy Category X and is known to cause serious birth defects. It is unknown if this medication is excreted in breast milk but breast feeding should be avoided.
Finasteride Pregnancy And Lactation Text: This medication is absolutely contraindicated during pregnancy. It is unknown if it is excreted in breast milk.
Rhofade Pregnancy And Lactation Text: This medication has not been assigned a Pregnancy Risk Category. It is unknown if the medication is excreted in breast milk.
Dutasteride Pregnancy And Lactation Text: This medication is absolutely contraindicated in women, especially during pregnancy and breast feeding. Feminization of male fetuses is possible if taking while pregnant.
Opioid Pregnancy And Lactation Text: These medications can lead to premature delivery and should be avoided during pregnancy. These medications are also present in breast milk in small amounts.
Calcipotriene Pregnancy And Lactation Text: This medication has not been proven safe during pregnancy. It is unknown if this medication is excreted in breast milk.
Tranexamic Acid Counseling:  Patient advised of the small risk of bleeding problems with tranexamic acid. They were also instructed to call if they developed any nausea, vomiting or diarrhea. All of the patient's questions and concerns were addressed.
Propranolol Counseling:  I discussed with the patient the risks of propranolol including but not limited to low heart rate, low blood pressure, low blood sugar, restlessness and increased cold sensitivity. They should call the office if they experience any of these side effects.
Calcipotriene Counseling:  I discussed with the patient the risks of calcipotriene including but not limited to erythema, scaling, itching, and irritation.
Winlevi Pregnancy And Lactation Text: This medication is considered safe during pregnancy and breastfeeding.
Azelaic Acid Counseling: Patient counseled that medicine may cause skin irritation and to avoid applying near the eyes.  In the event of skin irritation, the patient was advised to reduce the amount of the drug applied or use it less frequently.   The patient verbalized understanding of the proper use and possible adverse effects of azelaic acid.  All of the patient's questions and concerns were addressed.
Libtayo Pregnancy And Lactation Text: This medication is contraindicated in pregnancy and when breast feeding.
Propranolol Pregnancy And Lactation Text: This medication is Pregnancy Category C and it isn't known if it is safe during pregnancy. It is excreted in breast milk.
Opioid Counseling: I discussed with the patient the potential side effects of opioids including but not limited to addiction, altered mental status, and depression. I stressed avoiding alcohol, benzodiazepines, muscle relaxants and sleep aids unless specifically okayed by a physician. The patient verbalized understanding of the proper use and possible adverse effects of opioids. All of the patient's questions and concerns were addressed. They were instructed to flush the remaining pills down the toilet if they did not need them for pain.
Tranexamic Acid Pregnancy And Lactation Text: It is unknown if this medication is safe during pregnancy or breast feeding.
Topical Ketoconazole Counseling: Patient counseled that this medication may cause skin irritation or allergic reactions.  In the event of skin irritation, the patient was advised to reduce the amount of the drug applied or use it less frequently.   The patient verbalized understanding of the proper use and possible adverse effects of ketoconazole.  All of the patient's questions and concerns were addressed.
Wartpeel Counseling:  I discussed with the patient the risks of Wartpeel including but not limited to erythema, scaling, itching, weeping, crusting, and pain.
Oral Minoxidil Pregnancy And Lactation Text: This medication should only be used when clearly needed if you are pregnant, attempting to become pregnant or breast feeding.
Niacinamide Pregnancy And Lactation Text: These medications are considered safe during pregnancy.
Dutasteride Counseling: Dustasteride Counseling:  I discussed with the patient the risks of use of dutasteride including but not limited to decreased libido, decreased ejaculate volume, and gynecomastia. Women who can become pregnant should not handle medication.  All of the patient's questions and concerns were addressed.
Mirvaso Counseling: Mirvaso is a topical medication which can decrease superficial blood flow where applied. Side effects are uncommon and include stinging, redness and allergic reactions.
Rhofade Counseling: Rhofade is a topical medication which can decrease superficial blood flow where applied. Side effects are uncommon and include stinging, redness and allergic reactions.
Oral Minoxidil Counseling- I discussed with the patient the risks of oral minoxidil including but not limited to shortness of breath, swelling of the feet or ankles, dizziness, lightheadedness, unwanted hair growth and allergic reaction.  The patient verbalized understanding of the proper use and possible adverse effects of oral minoxidil.  All of the patient's questions and concerns were addressed.
Winlevi Counseling:  I discussed with the patient the risks of topical clascoterone including but not limited to erythema, scaling, itching, and stinging. Patient voiced their understanding.
Niacinamide Counseling: I recommended taking niacin or niacinamide, also know as vitamin B3, twice daily. Recent evidence suggests that taking vitamin B3 (500 mg twice daily) can reduce the risk of actinic keratoses and non-melanoma skin cancers. Side effects of vitamin B3 include flushing and headache.
Finasteride Counseling:  I discussed with the patient the risks of use of finasteride including but not limited to decreased libido, decreased ejaculate volume, gynecomastia, and depression. Women should not handle medication.  All of the patient's questions and concerns were addressed.
Libtayo Counseling- I discussed with the patient the risks of Libtayo including but not limited to nausea, vomiting, diarrhea, and bone or muscle pain.  The patient verbalized understanding of the proper use and possible adverse effects of Libtayo.  All of the patient's questions and concerns were addressed.

## 2022-05-31 NOTE — HPI: TESTING (BLOOD WORK)
Have You Had Previous Labs For This Condition?: has had previous labs
Are You Fasting?: Yes
When Was Your Last Blood Work Done?: 05/20/2022

## 2022-07-05 ENCOUNTER — APPOINTMENT (RX ONLY)
Dept: URBAN - METROPOLITAN AREA CLINIC 125 | Facility: CLINIC | Age: 57
Setting detail: DERMATOLOGY
End: 2022-07-05

## 2022-07-05 DIAGNOSIS — L71.8 OTHER ROSACEA: ICD-10-CM | Status: STABLE

## 2022-07-05 DIAGNOSIS — L259 CONTACT DERMATITIS AND OTHER ECZEMA, UNSPECIFIED CAUSE: ICD-10-CM | Status: STABLE

## 2022-07-05 DIAGNOSIS — L20.89 OTHER ATOPIC DERMATITIS: ICD-10-CM | Status: INADEQUATELY CONTROLLED

## 2022-07-05 DIAGNOSIS — Z79.899 OTHER LONG TERM (CURRENT) DRUG THERAPY: ICD-10-CM

## 2022-07-05 PROBLEM — L23.9 ALLERGIC CONTACT DERMATITIS, UNSPECIFIED CAUSE: Status: ACTIVE | Noted: 2022-07-05

## 2022-07-05 PROCEDURE — ? PRESCRIPTION MEDICATION MANAGEMENT

## 2022-07-05 PROCEDURE — ? ORDER TESTS

## 2022-07-05 PROCEDURE — ? ADDITIONAL NOTES

## 2022-07-05 PROCEDURE — ? HIGH RISK MEDICATION MONITORING

## 2022-07-05 PROCEDURE — ? CHRONOLOGY OF PRESENT ILLNESS

## 2022-07-05 PROCEDURE — 99214 OFFICE O/P EST MOD 30 MIN: CPT

## 2022-07-05 PROCEDURE — ? OTHER

## 2022-07-05 PROCEDURE — ? COUNSELING

## 2022-07-05 PROCEDURE — ? PRESCRIPTION

## 2022-07-05 RX ORDER — RUXOLITINIB 15 MG/G
CREAM TOPICAL
Qty: 60 | Refills: 2 | Status: ERX | COMMUNITY
Start: 2022-07-05

## 2022-07-05 RX ADMIN — RUXOLITINIB: 15 CREAM TOPICAL at 00:00

## 2022-07-05 ASSESSMENT — LOCATION SIMPLE DESCRIPTION DERM
LOCATION SIMPLE: LEFT EAR
LOCATION SIMPLE: LEFT EYE
LOCATION SIMPLE: LEFT SCALP
LOCATION SIMPLE: RIGHT EAR
LOCATION SIMPLE: RIGHT FOREARM
LOCATION SIMPLE: RIGHT EYE
LOCATION SIMPLE: RIGHT HAND
LOCATION SIMPLE: LEFT FOREARM
LOCATION SIMPLE: LEFT AXILLARY VAULT
LOCATION SIMPLE: LEFT HAND

## 2022-07-05 ASSESSMENT — LOCATION DETAILED DESCRIPTION DERM
LOCATION DETAILED: LEFT AXILLARY VAULT
LOCATION DETAILED: RIGHT PUPIL
LOCATION DETAILED: LEFT PUPIL
LOCATION DETAILED: LEFT CAVUM CONCHA
LOCATION DETAILED: RIGHT ULNAR DORSAL HAND
LOCATION DETAILED: LEFT MEDIAL FRONTAL SCALP
LOCATION DETAILED: RIGHT CYMBA CONCHA
LOCATION DETAILED: LEFT INFERIOR CRUS OF ANTIHELIX
LOCATION DETAILED: RIGHT VENTRAL PROXIMAL FOREARM
LOCATION DETAILED: LEFT RADIAL DORSAL HAND
LOCATION DETAILED: RIGHT CAVUM CONCHA
LOCATION DETAILED: LEFT VENTRAL PROXIMAL FOREARM

## 2022-07-05 ASSESSMENT — LOCATION ZONE DERM
LOCATION ZONE: ARM
LOCATION ZONE: CORNEA
LOCATION ZONE: HAND
LOCATION ZONE: EAR
LOCATION ZONE: AXILLAE
LOCATION ZONE: SCALP

## 2022-07-05 NOTE — PROCEDURE: CHRONOLOGY OF PRESENT ILLNESS
Chronology Of Present Illness: 8/8/2019:  Rash, located on the right ear and scalp. The rash is scaly, red, and itchy. The rash has been present for years. She has arthritis. She is not currently on any medications. She was started on Olux foam and ketoconazole cream.\\n11/17/2019: scalp is improved, ears are not. 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 She was started on hydrocortisone butuyrate and ketoconazole for the underarms. Biopsy obtained (eczematous dermatitis)\\n1/3/2020: Axillae have improved. scalp and ears as well. Continue current topicals and Pramosone lotion was added for the underarms.\\n2/5/2020: drastic improvement of axillae. She discontinued Hydrocortisone cream and is only using ketoconazole cream. scalp and ears are stable. Start Elidel cream to ears. \\n3/25/2020: scalp and ears not improved. Discussed Dupixent with patient. Consider after Cn19. Continue current topicals.\\n5/27/2020: scalp still flaring. Punch bx obtained of neck. (folliculitis, but not c/w clinical symptoms and presentation. Favor eczematous derm/AD. Continue current topicals. \\n8/4/2020: rash not well controlled on topicals. Start Dupixent enrollment. IGA score 4. Full biologic labs obtained. RX Valtrex given.\\n9/1/2020: Dupixent start today. Recommend Systane eyedrops and Valtrex PRN as needed. \\n10/6/2020: Patient is somewhat improved, overall itching is better. Her ears are still very itchy. She is using Olux foam and ketoconazole cream to ears. She complains of new onset of joint pain above elbows and shoulders. She will see Dr. Dai and we will send letter.\\n11/3/2020: patient is improved overall. She still complains of itchy ears. Recommend talking to her audiologist to see if there are other hearing aids that are made of different material. RX Mimyx given for barrier cream. Continue Protopic 0.1% ointment QD PRN to ears and other topicals. Continue Dupixent injections. IGA score 1\\n12/9/2020: patient is much better. Ears are better. Hearing aid manufacturers told her that hearing aids are made out of medical grade hypoallergenic silicone. Itching has improved. Continue current topicals, antihistamines and Dupixent. Start Zonalon cream to ears for itching. Lab order given to patient today. Discussed that injection site reactions should subside.\\n3/17/21: eyes are very bothersome. Discussed possible component of rosacea. Rec warm rice sock compresses in addition to the Systane eye drops. For now continue Dupixent and topicals.\\n4/19/21: Eyes are still dry and bothersome. She states she will be seeing a dry eye specialist on Wednesday. She reports continued itching in the ears. Recommend not making too many changes to treatment regimen due to patient?s upcoming appt with dry eye specialist. Pramosone cream prescribed today for itching in ears, briefly discussed R/B/SE of gabapentin to consider starting at NOV if itching does not improve.\\n7/19/21: stable. Continue Dupixent Q 2 weeks and topicals. Recently diagnosed with gastroenteritis and possible sepsis. 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 Continue Dupixent and all topicals as needed.\\n1/19/22: patient is stable. Continue Dupixent and topicals PRN.\\n4/20/22: her itching (due to oak pollen) starts 3-4 days prior to next injection. Discussed Rinvoque with patient. Discussed it controls the itching better. Discussed risks, benefits and side effects with patient (increase in malignancies, cardio vascular events, blood clots, DVT - usually associated with prior underlying smoking history or other diseases). Discussed injecting Dupixent Q10 days vs Q14 days. Patient desires to continue Dupixent at shorter intervals. Consider adding on Tim inhibitor during environmental allergy season in the future.\\n5/18/22: Patient reports itching is minimally improved on modified Dupixent dosing (every 9-10 days). She reports worst areas are on the arms and inside ears. Re-discussed R/B/SE of Rinvoq in detail today. Patient reports worst 7/10 itch. Patient reports no personal hx of cancer or blood clots. She reports hx of migraines. Patient reports worsened eye dryness since starting Dupixent therapy. She is agreeable to start Rinvoq, insurance coverage check initiated today. Patient to obtain repeat full biologics labs, lab slip provided to patient to get drawn ASAP (MUST be fasting). She is due for her next Dupixent injection this Sunday. She has not been vaccinated for shingles, recommend getting shingles vaccine first ASAP, then inject Dupixent as scheduled on Sunday. F/u in 2 weeks. Sample saved for pt, plan to start therapy at NOV.\\n5/31/22: Patient received shingles vaccine right after LOV. Last Dupixent injection 5/22/22. Patient is agreeable to start Rinvoq today. Rinvoq PA denied, will submit appeal with SCORAD score (39). Symptoms are worsening, causing intense itchiness and sleeplessness. Start Rinvoq today, samples provided in office.\\n7/5/22: Patient reports itching was much better when taking Rinvoq. Decreased itch factor from 6-7, down to a 2-3. Patient reports light headedness, nausea, indigestion, twitching in her chest while taking Rinvoq. She stopped it a couple days ago due to symptoms. She is still recovering from the side effects. Recommend starting Opzelura at this point. Consider restarting Rinvoq every other day once symptoms have resolved. Discussed Vtama with patient as well if Opzelura is not improving her symptoms.
Detail Level: Zone

## 2022-07-05 NOTE — PROCEDURE: PRESCRIPTION MEDICATION MANAGEMENT
Render In Strict Bullet Format?: No
Detail Level: Zone
Initiate Treatment: Opzelura 1.5 % topical cream \\nSig: AAA rash BID
Continue Regimen: Systane eye drops QD \\nOlux 0.05% foam BID PRN flares\\nKetoconazole 2% shampoo BIW-TIW\\nKetoconazole 2% cream BID to ears\\nAllegra QAM (continue)\\nClaritin QHS (continue)\\nProtopic ointment 0.1% QD-BID to ears\\nMimyx BID to ears\\nVytone cream BID PRN flares to ears
Discontinue Regimen: Dupixent 300mg SC every 9-10 days (modified to 9-10 day dosage on 4/20/22; original start 9/1/2020)\\nRinvoq 15mg 1 PO QD
Plan: Patient should avoid all MCN related drugs. Patient previously discontinued Ximino 90mg 1 PO QD with dinner (due to DRESS syndrome) and Oracea 40mg 1 PO QD with dinner (due to possible cross reaction). Patient saw ophthalmologist and restarted doxycycline 50mg QD. Discussed low potential of cross reactivity with doxycycline. Discussed possible resistance development with antibiotic dosing (50mg), previously recommended switching to doxycycline 20mg BID. Doxy 20mg not covered by insurance, so patient is back on doxy 50mg 1 PO QD. Symptoms are stable.
Continue Regimen: Doxycycline 50mg 1 PO QD with food
Continue Regimen: Protopic 0.1% ointment BID \\nMimyx cream QD as barrier\\nOlux 0.05% foam BID PRN flares\\nVytone cream BID PRN flares

## 2022-07-05 NOTE — PROCEDURE: OTHER
Render Risk Assessment In Note?: no
Note Text (......Xxx Chief Complaint.): This diagnosis correlates with the
Other (Free Text): Hep/\\n\\nPatient to obtain repeat fasting labs prior to NOV in 1 mo if restarting Rinvoq
Detail Level: Simple

## 2022-08-05 ENCOUNTER — APPOINTMENT (RX ONLY)
Dept: URBAN - METROPOLITAN AREA CLINIC 125 | Facility: CLINIC | Age: 57
Setting detail: DERMATOLOGY
End: 2022-08-05

## 2022-08-05 DIAGNOSIS — L20.89 OTHER ATOPIC DERMATITIS: ICD-10-CM | Status: INADEQUATELY CONTROLLED

## 2022-08-05 DIAGNOSIS — Z79.899 OTHER LONG TERM (CURRENT) DRUG THERAPY: ICD-10-CM

## 2022-08-05 DIAGNOSIS — L259 CONTACT DERMATITIS AND OTHER ECZEMA, UNSPECIFIED CAUSE: ICD-10-CM | Status: INADEQUATELY CONTROLLED

## 2022-08-05 DIAGNOSIS — L71.8 OTHER ROSACEA: ICD-10-CM | Status: STABLE

## 2022-08-05 PROBLEM — L23.9 ALLERGIC CONTACT DERMATITIS, UNSPECIFIED CAUSE: Status: ACTIVE | Noted: 2022-08-05

## 2022-08-05 PROCEDURE — ? PRESCRIPTION MEDICATION MANAGEMENT

## 2022-08-05 PROCEDURE — ? COUNSELING

## 2022-08-05 PROCEDURE — ? CHRONOLOGY OF PRESENT ILLNESS

## 2022-08-05 PROCEDURE — ? HIGH RISK MEDICATION MONITORING

## 2022-08-05 PROCEDURE — ? OTHER

## 2022-08-05 PROCEDURE — ? DUPIXENT INITIATION

## 2022-08-05 PROCEDURE — 99214 OFFICE O/P EST MOD 30 MIN: CPT

## 2022-08-05 PROCEDURE — ? ADDITIONAL NOTES

## 2022-08-05 ASSESSMENT — LOCATION SIMPLE DESCRIPTION DERM
LOCATION SIMPLE: LEFT AXILLARY VAULT
LOCATION SIMPLE: RIGHT EYE
LOCATION SIMPLE: LEFT FOREARM
LOCATION SIMPLE: RIGHT HAND
LOCATION SIMPLE: RIGHT EAR
LOCATION SIMPLE: RIGHT FOREARM
LOCATION SIMPLE: LEFT SCALP
LOCATION SIMPLE: LEFT HAND
LOCATION SIMPLE: LEFT EYE
LOCATION SIMPLE: LEFT EAR

## 2022-08-05 ASSESSMENT — BSA ECZEMA: % BODY COVERED IN ECZEMA: 40

## 2022-08-05 ASSESSMENT — LOCATION DETAILED DESCRIPTION DERM
LOCATION DETAILED: LEFT PUPIL
LOCATION DETAILED: RIGHT PUPIL
LOCATION DETAILED: LEFT CAVUM CONCHA
LOCATION DETAILED: RIGHT ULNAR DORSAL HAND
LOCATION DETAILED: LEFT AXILLARY VAULT
LOCATION DETAILED: RIGHT VENTRAL PROXIMAL FOREARM
LOCATION DETAILED: LEFT RADIAL DORSAL HAND
LOCATION DETAILED: RIGHT CAVUM CONCHA
LOCATION DETAILED: RIGHT CYMBA CONCHA
LOCATION DETAILED: LEFT MEDIAL FRONTAL SCALP
LOCATION DETAILED: LEFT VENTRAL PROXIMAL FOREARM
LOCATION DETAILED: LEFT INFERIOR CRUS OF ANTIHELIX

## 2022-08-05 ASSESSMENT — LOCATION ZONE DERM
LOCATION ZONE: AXILLAE
LOCATION ZONE: CORNEA
LOCATION ZONE: HAND
LOCATION ZONE: ARM
LOCATION ZONE: EAR
LOCATION ZONE: SCALP

## 2022-08-05 ASSESSMENT — SEVERITY ASSESSMENT 2020: SEVERITY 2020: MODERATE

## 2022-08-05 NOTE — PROCEDURE: DUPIXENT INITIATION
Dupixent Monitoring Guidelines: There is no laboratory monitoring requirement with Dupixent.
Is Soriatane Contraindicated?: No
Detail Level: Zone
Comments: First dupixent start 9/1/20-5/22/22. Restart 8/5/22
Dupixent Dosing: 600 mg SC day 0 then 300 mg SC every other week
Diagnosis (Required): Atopic Dermatitis/Eczematous Dermatitis
Pregnancy And Lactation Warning Text: There have not been adverse fetal risks in women taking Dupixent while pregnant. It is unknown if this medication is excreted in breast milk.

## 2022-08-05 NOTE — PROCEDURE: OTHER
Render Risk Assessment In Note?: no
Note Text (......Xxx Chief Complaint.): This diagnosis correlates with the
Other (Free Text): Hep/
Detail Level: Simple

## 2022-08-05 NOTE — PROCEDURE: CHRONOLOGY OF PRESENT ILLNESS
Chronology Of Present Illness: 8/8/2019:  Rash, located on the right ear and scalp. The rash is scaly, red, and itchy. The rash has been present for years. She has arthritis. She is not currently on any medications. She was started on Olux foam and ketoconazole cream.\\n11/17/2019: scalp is improved, ears are not. 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 She was started on hydrocortisone butuyrate and ketoconazole for the underarms. Biopsy obtained (eczematous dermatitis)\\n1/3/2020: Axillae have improved. scalp and ears as well. Continue current topicals and Pramosone lotion was added for the underarms.\\n2/5/2020: drastic improvement of axillae. She discontinued Hydrocortisone cream and is only using ketoconazole cream. scalp and ears are stable. Start Elidel cream to ears. \\n3/25/2020: scalp and ears not improved. Discussed Dupixent with patient. Consider after Cn19. Continue current topicals.\\n5/27/2020: scalp still flaring. Punch bx obtained of neck. (folliculitis, but not c/w clinical symptoms and presentation. Favor eczematous derm/AD. Continue current topicals. \\n8/4/2020: rash not well controlled on topicals. Start Dupixent enrollment. IGA score 4. Full biologic labs obtained. RX Valtrex given.\\n9/1/2020: Dupixent start today. Recommend Systane eyedrops and Valtrex PRN as needed. \\n10/6/2020: Patient is somewhat improved, overall itching is better. Her ears are still very itchy. She is using Olux foam and ketoconazole cream to ears. She complains of new onset of joint pain above elbows and shoulders. She will see Dr. Dai and we will send letter.\\n11/3/2020: patient is improved overall. She still complains of itchy ears. Recommend talking to her audiologist to see if there are other hearing aids that are made of different material. RX Mimyx given for barrier cream. Continue Protopic 0.1% ointment QD PRN to ears and other topicals. Continue Dupixent injections. IGA score 1\\n12/9/2020: patient is much better. Ears are better. Hearing aid manufacturers told her that hearing aids are made out of medical grade hypoallergenic silicone. Itching has improved. Continue current topicals, antihistamines and Dupixent. Start Zonalon cream to ears for itching. Lab order given to patient today. Discussed that injection site reactions should subside.\\n3/17/21: eyes are very bothersome. Discussed possible component of rosacea. Rec warm rice sock compresses in addition to the Systane eye drops. For now continue Dupixent and topicals.\\n4/19/21: Eyes are still dry and bothersome. She states she will be seeing a dry eye specialist on Wednesday. She reports continued itching in the ears. Recommend not making too many changes to treatment regimen due to patient?s upcoming appt with dry eye specialist. Pramosone cream prescribed today for itching in ears, briefly discussed R/B/SE of gabapentin to consider starting at NOV if itching does not improve.\\n7/19/21: stable. Continue Dupixent Q 2 weeks and topicals. Recently diagnosed with gastroenteritis and possible sepsis. 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 Continue Dupixent and all topicals as needed.\\n1/19/22: patient is stable. Continue Dupixent and topicals PRN.\\n4/20/22: her itching (due to oak pollen) starts 3-4 days prior to next injection. Discussed Rinvoque with patient. Discussed it controls the itching better. Discussed risks, benefits and side effects with patient (increase in malignancies, cardio vascular events, blood clots, DVT - usually associated with prior underlying smoking history or other diseases). Discussed injecting Dupixent Q10 days vs Q14 days. Patient desires to continue Dupixent at shorter intervals. Consider adding on Tim inhibitor during environmental allergy season in the future.\\n5/18/22: Patient reports itching is minimally improved on modified Dupixent dosing (every 9-10 days). She reports worst areas are on the arms and inside ears. Re-discussed R/B/SE of Rinvoq in detail today. Patient reports worst 7/10 itch. Patient reports no personal hx of cancer or blood clots. She reports hx of migraines. Patient reports worsened eye dryness since starting Dupixent therapy. She is agreeable to start Rinvoq, insurance coverage check initiated today. Patient to obtain repeat full biologics labs, lab slip provided to patient to get drawn ASAP (MUST be fasting). She is due for her next Dupixent injection this Sunday. She has not been vaccinated for shingles, recommend getting shingles vaccine first ASAP, then inject Dupixent as scheduled on Sunday. F/u in 2 weeks. Sample saved for pt, plan to start therapy at NOV.\\n5/31/22: Patient received shingles vaccine right after LOV. Last Dupixent injection 5/22/22. Patient is agreeable to start Rinvoq today. Rinvoq PA denied, will submit appeal with SCORAD score (39). Symptoms are worsening, causing intense itchiness and sleeplessness. Start Rinvoq today, samples provided in office.\\n7/5/22: Patient reports itching was much better when taking Rinvoq. Decreased itch factor from 6-7, down to a 2-3. Patient reports light headedness, nausea, indigestion, twitching in her chest while taking Rinvoq. She stopped it a couple days ago due to symptoms. She is still recovering from the side effects. Recommend starting Opzelura at this point. Consider restarting Rinvoq every other day once symptoms have resolved. Discussed Vtama with patient as well if Opzelura is not improving her symptoms.\\n8/5/22: Pt reports itching has not improved since starting opzelura. Pt to restart dupixent with loading dose after todays OV, sample given to Pt (1 300mg SC injection, pt states she has 1 pen at home). Rx sent to [facility].
Detail Level: Zone

## 2022-08-05 NOTE — PROCEDURE: PRESCRIPTION MEDICATION MANAGEMENT
Render In Strict Bullet Format?: No
Detail Level: Zone
Initiate Treatment: Dupixent 600mg SC day 0, 300mg SC every 2 weeks
Continue Regimen: Systane eye drops QD \\nOlux 0.05% foam BID PRN flares\\nKetoconazole 2% shampoo BIW-TIW\\nKetoconazole 2% cream BID to ears\\nAllegra QAM (continue)\\nClaritin QHS (continue)\\nProtopic ointment 0.1% QD-BID to ears\\nMimyx BID to ears\\nVytone cream BID PRN flares to ears
Discontinue Regimen: Opzelura 1.5 % topical cream (refractory)
Plan: Pt to restart dupixent  Consider dupixent weekly if itching not subsiding. Discussed starting doxepin, hold off due to possible interaction with Trellegy. Consider prednisone in the future, might consider rapid taper at lower dose, Pt reports sleeplessness on previous prednisone taper (as prescribed by PCP). Recommended consult with allergist. Dr Muro is her PCP.
Plan: Patient should avoid all MCN related drugs. Patient previously discontinued Ximino 90mg 1 PO QD with dinner (due to DRESS syndrome) and Oracea 40mg 1 PO QD with dinner (due to possible cross reaction). Patient saw ophthalmologist and restarted doxycycline 50mg QD. Discussed low potential of cross reactivity with doxycycline. Discussed possible resistance development with antibiotic dosing (50mg), previously recommended switching to doxycycline 20mg BID. Doxy 20mg not covered by insurance, so patient is back on doxy 50mg 1 PO QD. Symptoms are stable.
Continue Regimen: Doxycycline 50mg 1 PO QD with food
Continue Regimen: Protopic 0.1% ointment BID \\nMimyx cream QD as barrier\\nOlux 0.05% foam BID PRN flares\\nVytone cream BID PRN flares
Plan: Pt reports that on rinvoq the itching on the ears was significantly reduced.

## 2022-08-17 ENCOUNTER — RX ONLY (OUTPATIENT)
Age: 57
Setting detail: RX ONLY
End: 2022-08-17

## 2022-08-17 RX ORDER — DUPILUMAB 300 MG/2ML
INJECTION, SOLUTION SUBCUTANEOUS
Qty: 1 | Refills: 2 | Status: ERX | COMMUNITY
Start: 2022-08-17

## 2022-09-02 ENCOUNTER — APPOINTMENT (RX ONLY)
Dept: URBAN - METROPOLITAN AREA CLINIC 125 | Facility: CLINIC | Age: 57
Setting detail: DERMATOLOGY
End: 2022-09-02

## 2022-09-02 DIAGNOSIS — L71.8 OTHER ROSACEA: ICD-10-CM | Status: STABLE

## 2022-09-02 DIAGNOSIS — L30.0 NUMMULAR DERMATITIS: ICD-10-CM | Status: INADEQUATELY CONTROLLED

## 2022-09-02 DIAGNOSIS — Z79.899 OTHER LONG TERM (CURRENT) DRUG THERAPY: ICD-10-CM

## 2022-09-02 DIAGNOSIS — L20.89 OTHER ATOPIC DERMATITIS: ICD-10-CM

## 2022-09-02 DIAGNOSIS — L259 CONTACT DERMATITIS AND OTHER ECZEMA, UNSPECIFIED CAUSE: ICD-10-CM | Status: STABLE

## 2022-09-02 PROBLEM — L30.9 DERMATITIS, UNSPECIFIED: Status: ACTIVE | Noted: 2022-09-02

## 2022-09-02 PROBLEM — L23.9 ALLERGIC CONTACT DERMATITIS, UNSPECIFIED CAUSE: Status: ACTIVE | Noted: 2022-09-02

## 2022-09-02 PROCEDURE — 99214 OFFICE O/P EST MOD 30 MIN: CPT

## 2022-09-02 PROCEDURE — ? CHRONOLOGY OF PRESENT ILLNESS

## 2022-09-02 PROCEDURE — ? PRESCRIPTION MEDICATION MANAGEMENT

## 2022-09-02 PROCEDURE — ? ADDITIONAL NOTES

## 2022-09-02 PROCEDURE — ? DUPIXENT MONITORING

## 2022-09-02 PROCEDURE — ? OTHER

## 2022-09-02 PROCEDURE — ? COUNSELING

## 2022-09-02 PROCEDURE — ? HIGH RISK MEDICATION MONITORING

## 2022-09-02 ASSESSMENT — LOCATION SIMPLE DESCRIPTION DERM
LOCATION SIMPLE: LEFT EAR
LOCATION SIMPLE: LEFT FOREARM
LOCATION SIMPLE: ABDOMEN
LOCATION SIMPLE: RIGHT HAND
LOCATION SIMPLE: RIGHT FOREARM
LOCATION SIMPLE: RIGHT EYE
LOCATION SIMPLE: LEFT HAND
LOCATION SIMPLE: RIGHT EAR
LOCATION SIMPLE: LEFT SCALP
LOCATION SIMPLE: LEFT AXILLARY VAULT
LOCATION SIMPLE: LEFT EYE

## 2022-09-02 ASSESSMENT — LOCATION DETAILED DESCRIPTION DERM
LOCATION DETAILED: RIGHT CYMBA CONCHA
LOCATION DETAILED: LEFT PUPIL
LOCATION DETAILED: LEFT CAVUM CONCHA
LOCATION DETAILED: RIGHT VENTRAL PROXIMAL FOREARM
LOCATION DETAILED: LEFT AXILLARY VAULT
LOCATION DETAILED: LEFT INFERIOR CRUS OF ANTIHELIX
LOCATION DETAILED: LEFT VENTRAL PROXIMAL FOREARM
LOCATION DETAILED: LEFT LATERAL ABDOMEN
LOCATION DETAILED: LEFT RADIAL DORSAL HAND
LOCATION DETAILED: LEFT MEDIAL FRONTAL SCALP
LOCATION DETAILED: RIGHT PUPIL
LOCATION DETAILED: RIGHT CAVUM CONCHA
LOCATION DETAILED: RIGHT ULNAR DORSAL HAND

## 2022-09-02 ASSESSMENT — SEVERITY ASSESSMENT 2020: SEVERITY 2020: MILD

## 2022-09-02 ASSESSMENT — LOCATION ZONE DERM
LOCATION ZONE: AXILLAE
LOCATION ZONE: ARM
LOCATION ZONE: HAND
LOCATION ZONE: CORNEA
LOCATION ZONE: TRUNK
LOCATION ZONE: EAR
LOCATION ZONE: SCALP

## 2022-09-02 ASSESSMENT — BSA ECZEMA: % BODY COVERED IN ECZEMA: 1

## 2022-09-02 NOTE — PROCEDURE: DUPIXENT MONITORING
Detail Level: Zone
Length Of Therapy: 1 month
Comments: First dupixent start 9/1/20-5/22/22. Restart : 8/5/22
Add High Risk Medication Management Associated Diagnosis?: No

## 2022-09-02 NOTE — PROCEDURE: CHRONOLOGY OF PRESENT ILLNESS
Chronology Of Present Illness: 8/8/2019:  Rash, located on the right ear and scalp. The rash is scaly, red, and itchy. The rash has been present for years. She has arthritis. She is not currently on any medications. She was started on Olux foam and ketoconazole cream.\\n11/17/2019: scalp is improved, ears are not. 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 She was started on hydrocortisone butuyrate and ketoconazole for the underarms. Biopsy obtained (eczematous dermatitis)\\n1/3/2020: Axillae have improved. scalp and ears as well. Continue current topicals and Pramosone lotion was added for the underarms.\\n2/5/2020: drastic improvement of axillae. She discontinued Hydrocortisone cream and is only using ketoconazole cream. scalp and ears are stable. Start Elidel cream to ears. \\n3/25/2020: scalp and ears not improved. Discussed Dupixent with patient. Consider after Cn19. Continue current topicals.\\n5/27/2020: scalp still flaring. Punch bx obtained of neck. (folliculitis, but not c/w clinical symptoms and presentation. Favor eczematous derm/AD. Continue current topicals. \\n8/4/2020: rash not well controlled on topicals. Start Dupixent enrollment. IGA score 4. Full biologic labs obtained. RX Valtrex given.\\n9/1/2020: Dupixent start today. Recommend Systane eyedrops and Valtrex PRN as needed. \\n10/6/2020: Patient is somewhat improved, overall itching is better. Her ears are still very itchy. She is using Olux foam and ketoconazole cream to ears. She complains of new onset of joint pain above elbows and shoulders. She will see Dr. Dai and we will send letter.\\n11/3/2020: patient is improved overall. She still complains of itchy ears. Recommend talking to her audiologist to see if there are other hearing aids that are made of different material. RX Mimyx given for barrier cream. Continue Protopic 0.1% ointment QD PRN to ears and other topicals. Continue Dupixent injections. IGA score 1\\n12/9/2020: patient is much better. Ears are better. Hearing aid manufacturers told her that hearing aids are made out of medical grade hypoallergenic silicone. Itching has improved. Continue current topicals, antihistamines and Dupixent. Start Zonalon cream to ears for itching. Lab order given to patient today. Discussed that injection site reactions should subside.\\n3/17/21: eyes are very bothersome. Discussed possible component of rosacea. Rec warm rice sock compresses in addition to the Systane eye drops. For now continue Dupixent and topicals.\\n4/19/21: Eyes are still dry and bothersome. She states she will be seeing a dry eye specialist on Wednesday. She reports continued itching in the ears. Recommend not making too many changes to treatment regimen due to patient?s upcoming appt with dry eye specialist. Pramosone cream prescribed today for itching in ears, briefly discussed R/B/SE of gabapentin to consider starting at NOV if itching does not improve.\\n7/19/21: stable. Continue Dupixent Q 2 weeks and topicals. Recently diagnosed with gastroenteritis and possible sepsis. 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 Continue Dupixent and all topicals as needed.\\n1/19/22: patient is stable. Continue Dupixent and topicals PRN.\\n4/20/22: her itching (due to oak pollen) starts 3-4 days prior to next injection. Discussed Rinvoque with patient. Discussed it controls the itching better. Discussed risks, benefits and side effects with patient (increase in malignancies, cardio vascular events, blood clots, DVT - usually associated with prior underlying smoking history or other diseases). Discussed injecting Dupixent Q10 days vs Q14 days. Patient desires to continue Dupixent at shorter intervals. Consider adding on Tim inhibitor during environmental allergy season in the future.\\n5/18/22: Patient reports itching is minimally improved on modified Dupixent dosing (every 9-10 days). She reports worst areas are on the arms and inside ears. Re-discussed R/B/SE of Rinvoq in detail today. Patient reports worst 7/10 itch. Patient reports no personal hx of cancer or blood clots. She reports hx of migraines. Patient reports worsened eye dryness since starting Dupixent therapy. She is agreeable to start Rinvoq, insurance coverage check initiated today. Patient to obtain repeat full biologics labs, lab slip provided to patient to get drawn ASAP (MUST be fasting). She is due for her next Dupixent injection this Sunday. She has not been vaccinated for shingles, recommend getting shingles vaccine first ASAP, then inject Dupixent as scheduled on Sunday. F/u in 2 weeks. Sample saved for pt, plan to start therapy at NOV.\\n5/31/22: Patient received shingles vaccine right after LOV. Last Dupixent injection 5/22/22. Patient is agreeable to start Rinvoq today. Rinvoq PA denied, will submit appeal with SCORAD score (39). Symptoms are worsening, causing intense itchiness and sleeplessness. Start Rinvoq today, samples provided in office.\\n7/5/22: Patient reports itching was much better when taking Rinvoq. Decreased itch factor from 6-7, down to a 2-3. Patient reports light headedness, nausea, indigestion, twitching in her chest while taking Rinvoq. She stopped it a couple days ago due to symptoms. She is still recovering from the side effects. Recommend starting Opzelura at this point. Consider restarting Rinvoq every other day once symptoms have resolved. Discussed Vtama with patient as well if Opzelura is not improving her symptoms.\\n8/5/22: Pt reports itching has not improved since starting opzelura. Pt to restart dupixent with loading dose after todays OV, sample given to Pt (1 300mg SC injection, pt states she has 1 pen at home). Rx sent to [facility].\\n9/2/22: pt states doing a little better after restarting Dupixent. Minor dry eyes and using Vytone and Olux foam topically currently. Will give pt Vtama sample today. Discussed considering restarting Rinvoq in future at lower dose. Patient states it worked the best but she complained of N/V and mild palpitations.
Detail Level: Zone

## 2022-09-02 NOTE — PROCEDURE: MIPS QUALITY
Quality 431: Preventive Care And Screening: Unhealthy Alcohol Use - Screening: Patient screened for unhealthy alcohol use using a single question and scores less than 2 times per year
Quality 110: Preventive Care And Screening: Influenza Immunization: Influenza Immunization not Administered due to Patient Allergy.
Detail Level: Detailed
Quality 130: Documentation Of Current Medications In The Medical Record: Current Medications Documented
Quality 402: Tobacco Use And Help With Quitting Among Adolescents: Patient screened for tobacco and never smoked
Quality 226: Preventive Care And Screening: Tobacco Use: Screening And Cessation Intervention: Patient screened for tobacco use and is an ex/non-smoker
Quality 431: Preventive Care And Screening: Unhealthy Alcohol Use - Screening: Patient not identified as an unhealthy alcohol user when screened for unhealthy alcohol use using a systematic screening method

## 2022-09-02 NOTE — PROCEDURE: PRESCRIPTION MEDICATION MANAGEMENT
Render In Strict Bullet Format?: No
Detail Level: Zone
Continue Regimen: Dupixent 300mg SC every 2 weeks\\nSystane eye drops QD \\nOlux 0.05% foam BID PRN flares\\nKetoconazole 2% shampoo BIW-TIW\\nKetoconazole 2% cream BID to ears\\nAllegra QAM (continue)\\nClaritin QHS (continue)\\nProtopic ointment 0.1% QD-BID to ears\\nMimyx BID to ears\\nVytone cream BID PRN flares to ears\\nOpzelura cream BID PRN (pt thinks refractory)
Samples Given: Vtama
Plan: Patient should avoid all [facility]CN related drugs. Patient previously discontinued Ximino 90mg 1 PO QD with dinner (due to DRESS syndrome) and Oracea 40mg 1 PO QD with dinner (due to possible cross reaction). Patient saw ophthalmologist and restarted doxycycline 50mg QD. Discussed low potential of cross reactivity with doxycycline. Discussed possible resistance development with antibiotic dosing (50mg), previously recommended switching to doxycycline 20mg BID. Doxy 20mg not covered by insurance, so patient is back on doxy 50mg 1 PO QD. Symptoms are stable.
Continue Regimen: Doxycycline 50mg 1 PO QD with food
Continue Regimen: Protopic 0.1% ointment BID \\nMimyx cream QD as barrier\\nOlux 0.05% foam BID PRN flares\\nVytone cream BID PRN flares
Plan: Pt reports that on rinvoq the itching on the ears was significantly reduced.
Initiate Treatment: See topicals above.

## 2022-09-02 NOTE — PROCEDURE: COUNSELING
Detail Level: Zone
Cleanser Recommendations: Neutrogena Ultra Gentle Daily Foaming Cleanser
Bleach Bath Recommendations: 1 cup of bleach to a full tub of lukewarm water, bathe 2-3 times weekly x 10min
Moisturizer Recommendations: Eucerin Advanced Repair Lotion
Antihistamine Recommendations: Zyrtec or Xyzal 1 PO QD
Detail Level: Simple

## 2022-11-10 ENCOUNTER — APPOINTMENT (RX ONLY)
Dept: URBAN - METROPOLITAN AREA CLINIC 125 | Facility: CLINIC | Age: 57
Setting detail: DERMATOLOGY
End: 2022-11-10

## 2022-11-10 DIAGNOSIS — L30.0 NUMMULAR DERMATITIS: ICD-10-CM

## 2022-11-10 DIAGNOSIS — L259 CONTACT DERMATITIS AND OTHER ECZEMA, UNSPECIFIED CAUSE: ICD-10-CM | Status: INADEQUATELY CONTROLLED

## 2022-11-10 DIAGNOSIS — Z79.899 OTHER LONG TERM (CURRENT) DRUG THERAPY: ICD-10-CM

## 2022-11-10 DIAGNOSIS — L20.89 OTHER ATOPIC DERMATITIS: ICD-10-CM | Status: INADEQUATELY CONTROLLED

## 2022-11-10 DIAGNOSIS — L71.8 OTHER ROSACEA: ICD-10-CM | Status: STABLE

## 2022-11-10 PROBLEM — L23.9 ALLERGIC CONTACT DERMATITIS, UNSPECIFIED CAUSE: Status: ACTIVE | Noted: 2022-11-10

## 2022-11-10 PROBLEM — L30.9 DERMATITIS, UNSPECIFIED: Status: ACTIVE | Noted: 2022-11-10

## 2022-11-10 PROCEDURE — 11900 INJECT SKIN LESIONS </W 7: CPT

## 2022-11-10 PROCEDURE — ? DUPIXENT MONITORING

## 2022-11-10 PROCEDURE — ? ADDITIONAL NOTES

## 2022-11-10 PROCEDURE — 99214 OFFICE O/P EST MOD 30 MIN: CPT | Mod: 25

## 2022-11-10 PROCEDURE — ? COUNSELING

## 2022-11-10 PROCEDURE — ? HIGH RISK MEDICATION MONITORING

## 2022-11-10 PROCEDURE — ? PRESCRIPTION MEDICATION MANAGEMENT

## 2022-11-10 PROCEDURE — ? CHRONOLOGY OF PRESENT ILLNESS

## 2022-11-10 PROCEDURE — ? OTHER

## 2022-11-10 PROCEDURE — ? INTRALESIONAL KENALOG

## 2022-11-10 ASSESSMENT — LOCATION SIMPLE DESCRIPTION DERM
LOCATION SIMPLE: RIGHT FOREARM
LOCATION SIMPLE: LEFT EYE
LOCATION SIMPLE: LEFT AXILLARY VAULT
LOCATION SIMPLE: LEFT HAND
LOCATION SIMPLE: LEFT FOREARM
LOCATION SIMPLE: RIGHT HAND
LOCATION SIMPLE: ABDOMEN
LOCATION SIMPLE: LEFT SCALP
LOCATION SIMPLE: RIGHT EYE
LOCATION SIMPLE: RIGHT EAR
LOCATION SIMPLE: LEFT EAR

## 2022-11-10 ASSESSMENT — LOCATION DETAILED DESCRIPTION DERM
LOCATION DETAILED: LEFT LATERAL ABDOMEN
LOCATION DETAILED: LEFT CAVUM CONCHA
LOCATION DETAILED: LEFT MEDIAL FRONTAL SCALP
LOCATION DETAILED: LEFT AXILLARY VAULT
LOCATION DETAILED: RIGHT PUPIL
LOCATION DETAILED: LEFT RADIAL DORSAL HAND
LOCATION DETAILED: RIGHT ULNAR DORSAL HAND
LOCATION DETAILED: LEFT VENTRAL PROXIMAL FOREARM
LOCATION DETAILED: RIGHT CYMBA CONCHA
LOCATION DETAILED: RIGHT CAVUM CONCHA
LOCATION DETAILED: RIGHT VENTRAL PROXIMAL FOREARM
LOCATION DETAILED: LEFT PUPIL
LOCATION DETAILED: LEFT INFERIOR CRUS OF ANTIHELIX

## 2022-11-10 ASSESSMENT — SEVERITY ASSESSMENT 2020: SEVERITY 2020: MODERATE

## 2022-11-10 ASSESSMENT — SEVERITY ASSESSMENT: SEVERITY: MODERATE TO SEVERE

## 2022-11-10 ASSESSMENT — LOCATION ZONE DERM
LOCATION ZONE: SCALP
LOCATION ZONE: ARM
LOCATION ZONE: HAND
LOCATION ZONE: TRUNK
LOCATION ZONE: EAR
LOCATION ZONE: AXILLAE
LOCATION ZONE: CORNEA

## 2022-11-10 ASSESSMENT — BSA ECZEMA: % BODY COVERED IN ECZEMA: 2

## 2022-11-10 NOTE — PROCEDURE: HIGH RISK MEDICATION MONITORING
Itraconazole Counseling:  I discussed with the patient the risks of itraconazole including but not limited to liver damage, nausea/vomiting, neuropathy, and severe allergy.  The patient understands that this medication is best absorbed when taken with acidic beverages such as non-diet cola or ginger ale.  The patient understands that monitoring is required including baseline LFTs and repeat LFTs at intervals.  The patient understands that they are to contact us or the primary physician if concerning signs are noted.
Azithromycin Counseling:  I discussed with the patient the risks of azithromycin including but not limited to GI upset, allergic reaction, drug rash, diarrhea, and yeast infections.
Ivermectin Pregnancy And Lactation Text: This medication is Pregnancy Category C and it isn't known if it is safe during pregnancy. It is also excreted in breast milk.
Infliximab Pregnancy And Lactation Text: This medication is Pregnancy Category B and is considered safe during pregnancy. It is unknown if this medication is excreted in breast milk.
Xolair Counseling:  Patient informed of potential adverse effects including but not limited to fever, muscle aches, rash and allergic reactions.  The patient verbalized understanding of the proper use and possible adverse effects of Xolair.  All of the patient's questions and concerns were addressed.
Bexarotene Counseling:  I discussed with the patient the risks of bexarotene including but not limited to hair loss, dry lips/skin/eyes, liver abnormalities, hyperlipidemia, pancreatitis, depression/suicidal ideation, photosensitivity, drug rash/allergic reactions, hypothyroidism, anemia, leukopenia, infection, cataracts, and teratogenicity.  Patient understands that they will need regular blood tests to check lipid profile, liver function tests, white blood cell count, thyroid function tests and pregnancy test if applicable.
Hydroxychloroquine Pregnancy And Lactation Text: This medication has been shown to cause fetal harm but it isn't assigned a Pregnancy Risk Category. There are small amounts excreted in breast milk.
Xolair Pregnancy And Lactation Text: This medication is Pregnancy Category B and is considered safe during pregnancy. This medication is excreted in breast milk.
Nsaids Counseling: NSAID Counseling: I discussed with the patient that NSAIDs should be taken with food. Prolonged use of NSAIDs can result in the development of stomach ulcers.  Patient advised to stop taking NSAIDs if abdominal pain occurs.  The patient verbalized understanding of the proper use and possible adverse effects of NSAIDs.  All of the patient's questions and concerns were addressed.
Valtrex Pregnancy And Lactation Text: this medication is Pregnancy Category B and is considered safe during pregnancy. This medication is not directly found in breast milk but it's metabolite acyclovir is present.
Eucrisa Pregnancy And Lactation Text: This medication has not been assigned a Pregnancy Risk Category but animal studies failed to show danger with the topical medication. It is unknown if the medication is excreted in breast milk.
Quinolones Pregnancy And Lactation Text: This medication is Pregnancy Category C and it isn't know if it is safe during pregnancy. It is also excreted in breast milk.
Rituxan Counseling:  I discussed with the patient the risks of Rituxan infusions. Side effects can include infusion reactions, severe drug rashes including mucocutaneous reactions, reactivation of latent hepatitis and other infections and rarely progressive multifocal leukoencephalopathy.  All of the patient's questions and concerns were addressed.
Topical Clindamycin Pregnancy And Lactation Text: This medication is Pregnancy Category B and is considered safe during pregnancy. It is unknown if it is excreted in breast milk.
Bexarotene Pregnancy And Lactation Text: This medication is Pregnancy Category X and should not be given to women who are pregnant or may become pregnant. This medication should not be used if you are breast feeding.
Nsaids Pregnancy And Lactation Text: These medications are considered safe up to 30 weeks gestation. It is excreted in breast milk.
Topical Sulfur Applications Counseling: Topical Sulfur Counseling: Patient counseled that this medication may cause skin irritation or allergic reactions.  In the event of skin irritation, the patient was advised to reduce the amount of the drug applied or use it less frequently.   The patient verbalized understanding of the proper use and possible adverse effects of topical sulfur application.  All of the patient's questions and concerns were addressed.
Rifampin Counseling: I discussed with the patient the risks of rifampin including but not limited to liver damage, kidney damage, red-orange body fluids, nausea/vomiting and severe allergy.
Azithromycin Pregnancy And Lactation Text: This medication is considered safe during pregnancy and is also secreted in breast milk.
Hydroquinone Counseling:  Patient advised that medication may result in skin irritation, lightening (hypopigmentation), dryness, and burning.  In the event of skin irritation, the patient was advised to reduce the amount of the drug applied or use it less frequently.  Rarely, spots that are treated with hydroquinone can become darker (pseudoochronosis).  Should this occur, patient instructed to stop medication and call the office. The patient verbalized understanding of the proper use and possible adverse effects of hydroquinone.  All of the patient's questions and concerns were addressed.
Bactrim Counseling:  I discussed with the patient the risks of sulfa antibiotics including but not limited to GI upset, allergic reaction, drug rash, diarrhea, dizziness, photosensitivity, and yeast infections.  Rarely, more serious reactions can occur including but not limited to aplastic anemia, agranulocytosis, methemoglobinemia, blood dyscrasias, liver or kidney failure, lung infiltrates or desquamative/blistering drug rashes.
Ketoconazole Counseling:   Patient counseled regarding improving absorption with orange juice.  Adverse effects include but are not limited to breast enlargement, headache, diarrhea, nausea, upset stomach, liver function test abnormalities, taste disturbance, and stomach pain.  There is a rare possibility of liver failure that can occur when taking ketoconazole. The patient understands that monitoring of LFTs may be required, especially at baseline. The patient verbalized understanding of the proper use and possible adverse effects of ketoconazole.  All of the patient's questions and concerns were addressed.
Isotretinoin Counseling: Patient should get monthly blood tests, not donate blood, not drive at night if vision affected, not share medication, and not undergo elective surgery for 6 months after tx completed. Side effects reviewed, pt to contact office should one occur.
Use Enhanced Medication Counseling?: No
Odomzo Counseling- I discussed with the patient the risks of Odomzo including but not limited to nausea, vomiting, diarrhea, constipation, weight loss, changes in the sense of taste, decreased appetite, muscle spasms, and hair loss.  The patient verbalized understanding of the proper use and possible adverse effects of Odomzo.  All of the patient's questions and concerns were addressed.
Topical Sulfur Applications Pregnancy And Lactation Text: This medication is Pregnancy Category C and has an unknown safety profile during pregnancy. It is unknown if this topical medication is excreted in breast milk.
Rituxan Pregnancy And Lactation Text: This medication is Pregnancy Category C and it isn't know if it is safe during pregnancy. It is unknown if this medication is excreted in breast milk but similar antibodies are known to be excreted.
Azathioprine Counseling:  I discussed with the patient the risks of azathioprine including but not limited to myelosuppression, immunosuppression, hepatotoxicity, lymphoma, and infections.  The patient understands that monitoring is required including baseline LFTs, Creatinine, possible TPMP genotyping and weekly CBCs for the first month and then every 2 weeks thereafter.  The patient verbalized understanding of the proper use and possible adverse effects of azathioprine.  All of the patient's questions and concerns were addressed.
Azathioprine Pregnancy And Lactation Text: This medication is Pregnancy Category D and isn't considered safe during pregnancy. It is unknown if this medication is excreted in breast milk.
Ketoconazole Pregnancy And Lactation Text: This medication is Pregnancy Category C and it isn't know if it is safe during pregnancy. It is also excreted in breast milk and breast feeding isn't recommended.
Siliq Counseling:  I discussed with the patient the risks of Siliq including but not limited to new or worsening depression, suicidal thoughts and behavior, immunosuppression, malignancy, posterior leukoencephalopathy syndrome, and serious infections.  The patient understands that monitoring is required including a PPD at baseline and must alert us or the primary physician if symptoms of infection or other concerning signs are noted. There is also a special program designed to monitor depression which is required with Siliq.
Bactrim Pregnancy And Lactation Text: This medication is Pregnancy Category D and is known to cause fetal risk.  It is also excreted in breast milk.
Isotretinoin Pregnancy And Lactation Text: This medication is Pregnancy Category X and is considered extremely dangerous during pregnancy. It is unknown if it is excreted in breast milk.
High Dose Vitamin A Counseling: Side effects reviewed, pt to contact office should one occur.
Odomzo Pregnancy And Lactation Text: This medication is Pregnancy Category X and is absolutely contraindicated during pregnancy. It is unknown if it is excreted in breast milk.
Zyclara Counseling:  I discussed with the patient the risks of imiquimod including but not limited to erythema, scaling, itching, weeping, crusting, and pain.  Patient understands that the inflammatory response to imiquimod is variable from person to person and was educated regarded proper titration schedule.  If flu-like symptoms develop, patient knows to discontinue the medication and contact us.
Imiquimod Counseling:  I discussed with the patient the risks of imiquimod including but not limited to erythema, scaling, itching, weeping, crusting, and pain.  Patient understands that the inflammatory response to imiquimod is variable from person to person and was educated regarded proper titration schedule.  If flu-like symptoms develop, patient knows to discontinue the medication and contact us.
Imiquimod Pregnancy And Lactation Text: This medication is Pregnancy Category C. It is unknown if this medication is excreted in breast milk.
Cephalexin Counseling: I counseled the patient regarding use of cephalexin as an antibiotic for prophylactic and/or therapeutic purposes. Cephalexin (commonly prescribed under brand name Keflex) is a cephalosporin antibiotic which is active against numerous classes of bacteria, including most skin bacteria. Side effects may include nausea, diarrhea, gastrointestinal upset, rash, hives, yeast infections, and in rare cases, hepatitis, kidney disease, seizures, fever, confusion, neurologic symptoms, and others. Patients with severe allergies to penicillin medications are cautioned that there is about a 10% incidence of cross-reactivity with cephalosporins. When possible, patients with penicillin allergies should use alternatives to cephalosporins for antibiotic therapy.
Siliq Pregnancy And Lactation Text: The risk during pregnancy and breastfeeding is uncertain with this medication.
Terbinafine Counseling: Patient counseling regarding adverse effects of terbinafine including but not limited to headache, diarrhea, rash, upset stomach, liver function test abnormalities, itching, taste/smell disturbance, nausea, abdominal pain, and flatulence.  There is a rare possibility of liver failure that can occur when taking terbinafine.  The patient understands that a baseline LFT and kidney function test may be required. The patient verbalized understanding of the proper use and possible adverse effects of terbinafine.  All of the patient's questions and concerns were addressed.
Cellcept Counseling:  I discussed with the patient the risks of mycophenolate mofetil including but not limited to infection/immunosuppression, GI upset, hypokalemia, hypercholesterolemia, bone marrow suppression, lymphoproliferative disorders, malignancy, GI ulceration/bleed/perforation, colitis, interstitial lung disease, kidney failure, progressive multifocal leukoencephalopathy, and birth defects.  The patient understands that monitoring is required including a baseline creatinine and regular CBC testing. In addition, patient must alert us immediately if symptoms of infection or other concerning signs are noted.
High Dose Vitamin A Pregnancy And Lactation Text: High dose vitamin A therapy is contraindicated during pregnancy and breast feeding.
Cimzia Counseling:  I discussed with the patient the risks of Cimzia including but not limited to immunosuppression, allergic reactions and infections.  The patient understands that monitoring is required including a PPD at baseline and must alert us or the primary physician if symptoms of infection or other concerning signs are noted.
Otezla Counseling: The side effects of Otezla were discussed with the patient, including but not limited to worsening or new depression, weight loss, diarrhea, nausea, upper respiratory tract infection, and headache. Patient instructed to call the office should any adverse effect occur.  The patient verbalized understanding of the proper use and possible adverse effects of Otezla.  All the patient's questions and concerns were addressed.
Terbinafine Pregnancy And Lactation Text: This medication is Pregnancy Category B and is considered safe during pregnancy. It is also excreted in breast milk and breast feeding isn't recommended.
Otezla Pregnancy And Lactation Text: This medication is Pregnancy Category C and it isn't known if it is safe during pregnancy. It is unknown if it is excreted in breast milk.
Minoxidil Counseling: Minoxidil is a topical medication which can increase blood flow where it is applied. It is uncertain how this medication increases hair growth. Side effects are uncommon and include stinging and allergic reactions.
Detail Level: Generalized
Simponi Counseling:  I discussed with the patient the risks of golimumab including but not limited to myelosuppression, immunosuppression, autoimmune hepatitis, demyelinating diseases, lymphoma, and serious infections.  The patient understands that monitoring is required including a PPD at baseline and must alert us or the primary physician if symptoms of infection or other concerning signs are noted.
Cephalexin Pregnancy And Lactation Text: This medication is Pregnancy Category B and considered safe during pregnancy.  It is also excreted in breast milk but can be used safely for shorter doses.
Cimzia Pregnancy And Lactation Text: This medication crosses the placenta but can be considered safe in certain situations. Cimzia may be excreted in breast milk.
Oxybutynin Counseling:  I discussed with the patient the risks of oxybutynin including but not limited to skin rash, drowsiness, dry mouth, difficulty urinating, and blurred vision.
Cyclophosphamide Counseling:  I discussed with the patient the risks of cyclophosphamide including but not limited to hair loss, hormonal abnormalities, decreased fertility, abdominal pain, diarrhea, nausea and vomiting, bone marrow suppression and infection. The patient understands that monitoring is required while taking this medication.
Clindamycin Counseling: I counseled the patient regarding use of clindamycin as an antibiotic for prophylactic and/or therapeutic purposes. Clindamycin is active against numerous classes of bacteria, including skin bacteria. Side effects may include nausea, diarrhea, gastrointestinal upset, rash, hives, yeast infections, and in rare cases, colitis.
Cimetidine Counseling:  I discussed with the patient the risks of Cimetidine including but not limited to gynecomastia, headache, diarrhea, nausea, drowsiness, arrhythmias, pancreatitis, skin rashes, psychosis, bone marrow suppression and kidney toxicity.
Benzoyl Peroxide Counseling: Patient counseled that medicine may cause skin irritation and bleach clothing.  In the event of skin irritation, the patient was advised to reduce the amount of the drug applied or use it less frequently.   The patient verbalized understanding of the proper use and possible adverse effects of benzoyl peroxide.  All of the patient's questions and concerns were addressed.
Picato Counseling:  I discussed with the patient the risks of Picato including but not limited to erythema, scaling, itching, weeping, crusting, and pain.
Rifampin Pregnancy And Lactation Text: This medication is Pregnancy Category C and it isn't know if it is safe during pregnancy. It is also excreted in breast milk and should not be used if you are breast feeding.
Clindamycin Pregnancy And Lactation Text: This medication can be used in pregnancy if certain situations. Clindamycin is also present in breast milk.
Arava Counseling:  Patient counseled regarding adverse effects of Arava including but not limited to nausea, vomiting, abnormalities in liver function tests. Patients may develop mouth sores, rash, diarrhea, and abnormalities in blood counts. The patient understands that monitoring is required including LFTs and blood counts.  There is a rare possibility of scarring of the liver and lung problems that can occur when taking methotrexate. Persistent nausea, loss of appetite, pale stools, dark urine, cough, and shortness of breath should be reported immediately. Patient advised to discontinue Arava treatment and consult with a physician prior to attempting conception. The patient will have to undergo a treatment to eliminate Arava from the body prior to conception.
Cosentyx Counseling:  I discussed with the patient the risks of Cosentyx including but not limited to worsening of Crohn's disease, immunosuppression, allergic reactions and infections.  The patient understands that monitoring is required including a PPD at baseline and must alert us or the primary physician if symptoms of infection or other concerning signs are noted.
Cyclophosphamide Pregnancy And Lactation Text: This medication is Pregnancy Category D and it isn't considered safe during pregnancy. This medication is excreted in breast milk.
Skyrizi Counseling: I discussed with the patient the risks of risankizumab-rzaa including but not limited to immunosuppression, and serious infections.  The patient understands that monitoring is required including a PPD at baseline and must alert us or the primary physician if symptoms of infection or other concerning signs are noted.
Cyclosporine Counseling:  I discussed with the patient the risks of cyclosporine including but not limited to hypertension, gingival hyperplasia,myelosuppression, immunosuppression, liver damage, kidney damage, neurotoxicity, lymphoma, and serious infections. The patient understands that monitoring is required including baseline blood pressure, CBC, CMP, lipid panel and uric acid, and then 1-2 times monthly CMP and blood pressure.
Dapsone Counseling: I discussed with the patient the risks of dapsone including but not limited to hemolytic anemia, agranulocytosis, rashes, methemoglobinemia, kidney failure, peripheral neuropathy, headaches, GI upset, and liver toxicity.  Patients who start dapsone require monitoring including baseline LFTs and weekly CBCs for the first month, then every month thereafter.  The patient verbalized understanding of the proper use and possible adverse effects of dapsone.  All of the patient's questions and concerns were addressed.
Sarecycline Counseling: Patient advised regarding possible photosensitivity and discoloration of the teeth, skin, lips, tongue and gums.  Patient instructed to avoid sunlight, if possible.  When exposed to sunlight, patients should wear protective clothing, sunglasses, and sunscreen.  The patient was instructed to call the office immediately if the following severe adverse effects occur:  hearing changes, easy bruising/bleeding, severe headache, or vision changes.  The patient verbalized understanding of the proper use and possible adverse effects of sarecycline.  All of the patient's questions and concerns were addressed.
Benzoyl Peroxide Pregnancy And Lactation Text: This medication is Pregnancy Category C. It is unknown if benzoyl peroxide is excreted in breast milk.
Carac Counseling:  I discussed with the patient the risks of Carac including but not limited to erythema, scaling, itching, weeping, crusting, and pain.
Birth Control Pills Counseling: Birth Control Pill Counseling: I discussed with the patient the potential side effects of OCPs including but not limited to increased risk of stroke, heart attack, thrombophlebitis, deep venous thrombosis, hepatic adenomas, breast changes, GI upset, headaches, and depression.  The patient verbalized understanding of the proper use and possible adverse effects of OCPs. All of the patient's questions and concerns were addressed.
Dupixent Counseling: I discussed with the patient the risks of dupilumab including but not limited to eye infection and irritation, cold sores, injection site reactions, worsening of asthma, allergic reactions and increased risk of parasitic infection.  Live vaccines should be avoided while taking dupilumab. Dupilumab will also interact with certain medications such as warfarin and cyclosporine. The patient understands that monitoring is required and they must alert us or the primary physician if symptoms of infection or other concerning signs are noted.
Doxycycline Counseling:  Patient counseled regarding possible photosensitivity and increased risk for sunburn.  Patient instructed to avoid sunlight, if possible.  When exposed to sunlight, patients should wear protective clothing, sunglasses, and sunscreen.  The patient was instructed to call the office immediately if the following severe adverse effects occur:  hearing changes, easy bruising/bleeding, severe headache, or vision changes.  The patient verbalized understanding of the proper use and possible adverse effects of doxycycline.  All of the patient's questions and concerns were addressed.
Clofazimine Counseling:  I discussed with the patient the risks of clofazimine including but not limited to skin and eye pigmentation, liver damage, nausea/vomiting, gastrointestinal bleeding and allergy.
Protopic Counseling: Patient may experience a mild burning sensation during topical application. Protopic is not approved in children less than 2 years of age. There have been case reports of hematologic and skin malignancies in patients using topical calcineurin inhibitors although causality is questionable.
Sarecycline Pregnancy And Lactation Text: This medication is Pregnancy Category D and not consider safe during pregnancy. It is also excreted in breast milk.
Doxycycline Pregnancy And Lactation Text: This medication is Pregnancy Category D and not consider safe during pregnancy. It is also excreted in breast milk but is considered safe for shorter treatment courses.
Stelara Counseling:  I discussed with the patient the risks of ustekinumab including but not limited to immunosuppression, malignancy, posterior leukoencephalopathy syndrome, and serious infections.  The patient understands that monitoring is required including a PPD at baseline and must alert us or the primary physician if symptoms of infection or other concerning signs are noted.
Dapsone Pregnancy And Lactation Text: This medication is Pregnancy Category C and is not considered safe during pregnancy or breast feeding.
Clofazimine Pregnancy And Lactation Text: This medication is Pregnancy Category C and isn't considered safe during pregnancy. It is excreted in breast milk.
Dupixent Pregnancy And Lactation Text: This medication likely crosses the placenta but the risk for the fetus is uncertain. This medication is excreted in breast milk.
Carac Pregnancy And Lactation Text: This medication is Pregnancy Category X and contraindicated in pregnancy and in women who may become pregnant. It is unknown if this medication is excreted in breast milk.
Cyclosporine Pregnancy And Lactation Text: This medication is Pregnancy Category C and it isn't know if it is safe during pregnancy. This medication is excreted in breast milk.
Doxepin Counseling:  Patient advised that the medication is sedating and not to drive a car after taking this medication. Patient informed of potential adverse effects including but not limited to dry mouth, urinary retention, and blurry vision.  The patient verbalized understanding of the proper use and possible adverse effects of doxepin.  All of the patient's questions and concerns were addressed.
Birth Control Pills Pregnancy And Lactation Text: This medication should be avoided if pregnant and for the first 30 days post-partum.
Tetracycline Counseling: Patient counseled regarding possible photosensitivity and increased risk for sunburn.  Patient instructed to avoid sunlight, if possible.  When exposed to sunlight, patients should wear protective clothing, sunglasses, and sunscreen.  The patient was instructed to call the office immediately if the following severe adverse effects occur:  hearing changes, easy bruising/bleeding, severe headache, or vision changes.  The patient verbalized understanding of the proper use and possible adverse effects of tetracycline.  All of the patient's questions and concerns were addressed. Patient understands to avoid pregnancy while on therapy due to potential birth defects.
Doxepin Pregnancy And Lactation Text: This medication is Pregnancy Category C and it isn't known if it is safe during pregnancy. It is also excreted in breast milk and breast feeding isn't recommended.
Spironolactone Counseling: Patient advised regarding risks of diarrhea, abdominal pain, hyperkalemia, birth defects (for female patients), liver toxicity and renal toxicity. The patient may need blood work to monitor liver and kidney function and potassium levels while on therapy. The patient verbalized understanding of the proper use and possible adverse effects of spironolactone.  All of the patient's questions and concerns were addressed.
Erivedge Counseling- I discussed with the patient the risks of Erivedge including but not limited to nausea, vomiting, diarrhea, constipation, weight loss, changes in the sense of taste, decreased appetite, muscle spasms, and hair loss.  The patient verbalized understanding of the proper use and possible adverse effects of Erivedge.  All of the patient's questions and concerns were addressed.
Protopic Pregnancy And Lactation Text: This medication is Pregnancy Category C. It is unknown if this medication is excreted in breast milk when applied topically.
Taltz Counseling: I discussed with the patient the risks of ixekizumab including but not limited to immunosuppression, serious infections, worsening of inflammatory bowel disease and drug reactions.  The patient understands that monitoring is required including a PPD at baseline and must alert us or the primary physician if symptoms of infection or other concerning signs are noted.
5-Fu Counseling: 5-Fluorouracil Counseling:  I discussed with the patient the risks of 5-fluorouracil including but not limited to erythema, scaling, itching, weeping, crusting, and pain.
Enbrel Counseling:  I discussed with the patient the risks of etanercept including but not limited to myelosuppression, immunosuppression, autoimmune hepatitis, demyelinating diseases, lymphoma, and infections.  The patient understands that monitoring is required including a PPD at baseline and must alert us or the primary physician if symptoms of infection or other concerning signs are noted.
Colchicine Counseling:  Patient counseled regarding adverse effects including but not limited to stomach upset (nausea, vomiting, stomach pain, or diarrhea).  Patient instructed to limit alcohol consumption while taking this medication.  Colchicine may reduce blood counts especially with prolonged use.  The patient understands that monitoring of kidney function and blood counts may be required, especially at baseline. The patient verbalized understanding of the proper use and possible adverse effects of colchicine.  All of the patient's questions and concerns were addressed.
Methotrexate Counseling:  Patient counseled regarding adverse effects of methotrexate including but not limited to nausea, vomiting, abnormalities in liver function tests. Patients may develop mouth sores, rash, diarrhea, and abnormalities in blood counts. The patient understands that monitoring is required including LFT's and blood counts.  There is a rare possibility of scarring of the liver and lung problems that can occur when taking methotrexate. Persistent nausea, loss of appetite, pale stools, dark urine, cough, and shortness of breath should be reported immediately. Patient advised to discontinue methotrexate treatment at least three months before attempting to become pregnant.  I discussed the need for folate supplements while taking methotrexate.  These supplements can decrease side effects during methotrexate treatment. The patient verbalized understanding of the proper use and possible adverse effects of methotrexate.  All of the patient's questions and concerns were addressed.
Erythromycin Counseling:  I discussed with the patient the risks of erythromycin including but not limited to GI upset, allergic reaction, drug rash, diarrhea, increase in liver enzymes, and yeast infections.
Spironolactone Pregnancy And Lactation Text: This medication can cause feminization of the male fetus and should be avoided during pregnancy. The active metabolite is also found in breast milk.
Erythromycin Pregnancy And Lactation Text: This medication is Pregnancy Category B and is considered safe during pregnancy. It is also excreted in breast milk.
Methotrexate Pregnancy And Lactation Text: This medication is Pregnancy Category X and is known to cause fetal harm. This medication is excreted in breast milk.
Hydroxyzine Counseling: Patient advised that the medication is sedating and not to drive a car after taking this medication.  Patient informed of potential adverse effects including but not limited to dry mouth, urinary retention, and blurry vision.  The patient verbalized understanding of the proper use and possible adverse effects of hydroxyzine.  All of the patient's questions and concerns were addressed.
Solaraze Counseling:  I discussed with the patient the risks of Solaraze including but not limited to erythema, scaling, itching, weeping, crusting, and pain.
Solaraze Pregnancy And Lactation Text: This medication is Pregnancy Category B and is considered safe. There is some data to suggest avoiding during the third trimester. It is unknown if this medication is excreted in breast milk.
Hydroxyzine Pregnancy And Lactation Text: This medication is not safe during pregnancy and should not be taken. It is also excreted in breast milk and breast feeding isn't recommended.
Metronidazole Counseling:  I discussed with the patient the risks of metronidazole including but not limited to seizures, nausea/vomiting, a metallic taste in the mouth, nausea/vomiting and severe allergy.
Humira Counseling:  I discussed with the patient the risks of adalimumab including but not limited to myelosuppression, immunosuppression, autoimmune hepatitis, demyelinating diseases, lymphoma, and serious infections.  The patient understands that monitoring is required including a PPD at baseline and must alert us or the primary physician if symptoms of infection or other concerning signs are noted.
Prednisone Counseling:  I discussed with the patient the risks of prolonged use of prednisone including but not limited to weight gain, insomnia, osteoporosis, mood changes, diabetes, susceptibility to infection, glaucoma and high blood pressure.  In cases where prednisone use is prolonged, patients should be monitored with blood pressure checks, serum glucose levels and an eye exam.  Additionally, the patient may need to be placed on GI prophylaxis, PCP prophylaxis, and calcium and vitamin D supplementation and/or a bisphosphonate.  The patient verbalized understanding of the proper use and the possible adverse effects of prednisone.  All of the patient's questions and concerns were addressed.
Gabapentin Counseling: I discussed with the patient the risks of gabapentin including but not limited to dizziness, somnolence, fatigue and ataxia.
Tremfya Counseling: I discussed with the patient the risks of guselkumab including but not limited to immunosuppression, serious infections, worsening of inflammatory bowel disease and drug reactions.  The patient understands that monitoring is required including a PPD at baseline and must alert us or the primary physician if symptoms of infection or other concerning signs are noted.
SSKI Counseling:  I discussed with the patient the risks of SSKI including but not limited to thyroid abnormalities, metallic taste, GI upset, fever, headache, acne, arthralgias, paraesthesias, lymphadenopathy, easy bleeding, arrhythmias, and allergic reaction.
Drysol Counseling:  I discussed with the patient the risks of drysol/aluminum chloride including but not limited to skin rash, itching, irritation, burning.
Drysol Pregnancy And Lactation Text: This medication is considered safe during pregnancy and breast feeding.
Sski Pregnancy And Lactation Text: This medication is Pregnancy Category D and isn't considered safe during pregnancy. It is excreted in breast milk.
Topical Retinoid counseling:  Patient advised to apply a pea-sized amount only at bedtime and wait 30 minutes after washing their face before applying.  If too drying, patient may add a non-comedogenic moisturizer. The patient verbalized understanding of the proper use and possible adverse effects of retinoids.  All of the patient's questions and concerns were addressed.
Fluconazole Counseling:  Patient counseled regarding adverse effects of fluconazole including but not limited to headache, diarrhea, nausea, upset stomach, liver function test abnormalities, taste disturbance, and stomach pain.  There is a rare possibility of liver failure that can occur when taking fluconazole.  The patient understands that monitoring of LFTs and kidney function test may be required, especially at baseline. The patient verbalized understanding of the proper use and possible adverse effects of fluconazole.  All of the patient's questions and concerns were addressed.
Metronidazole Pregnancy And Lactation Text: This medication is Pregnancy Category B and considered safe during pregnancy.  It is also excreted in breast milk.
Glycopyrrolate Counseling:  I discussed with the patient the risks of glycopyrrolate including but not limited to skin rash, drowsiness, dry mouth, difficulty urinating, and blurred vision.
Minocycline Counseling: Patient advised regarding possible photosensitivity and discoloration of the teeth, skin, lips, tongue and gums.  Patient instructed to avoid sunlight, if possible.  When exposed to sunlight, patients should wear protective clothing, sunglasses, and sunscreen.  The patient was instructed to call the office immediately if the following severe adverse effects occur:  hearing changes, easy bruising/bleeding, severe headache, or vision changes.  The patient verbalized understanding of the proper use and possible adverse effects of minocycline.  All of the patient's questions and concerns were addressed.
Elidel Counseling: Patient may experience a mild burning sensation during topical application. Elidel is not approved in children less than 2 years of age. There have been case reports of hematologic and skin malignancies in patients using topical calcineurin inhibitors although causality is questionable.
Albendazole Counseling:  I discussed with the patient the risks of albendazole including but not limited to cytopenia, kidney damage, nausea/vomiting and severe allergy.  The patient understands that this medication is being used in an off-label manner.
Thalidomide Counseling: I discussed with the patient the risks of thalidomide including but not limited to birth defects, anxiety, weakness, chest pain, dizziness, cough and severe allergy.
Tazorac Counseling:  Patient advised that medication is irritating and drying.  Patient may need to apply sparingly and wash off after an hour before eventually leaving it on overnight.  The patient verbalized understanding of the proper use and possible adverse effects of tazorac.  All of the patient's questions and concerns were addressed.
Glycopyrrolate Pregnancy And Lactation Text: This medication is Pregnancy Category B and is considered safe during pregnancy. It is unknown if it is excreted breast milk.
Ilumya Counseling: I discussed with the patient the risks of tildrakizumab including but not limited to immunosuppression, malignancy, posterior leukoencephalopathy syndrome, and serious infections.  The patient understands that monitoring is required including a PPD at baseline and must alert us or the primary physician if symptoms of infection or other concerning signs are noted.
Xeljanz Counseling: I discussed with the patient the risks of Xeljanz therapy including increased risk of infection, liver issues, headache, diarrhea, or cold symptoms. Live vaccines should be avoided. They were instructed to call if they have any problems.
Xelnickyz Pregnancy And Lactation Text: This medication is Pregnancy Category D and is not considered safe during pregnancy.  The risk during breast feeding is also uncertain.
Acitretin Counseling:  I discussed with the patient the risks of acitretin including but not limited to hair loss, dry lips/skin/eyes, liver damage, hyperlipidemia, depression/suicidal ideation, photosensitivity.  Serious rare side effects can include but are not limited to pancreatitis, pseudotumor cerebri, bony changes, clot formation/stroke/heart attack.  Patient understands that alcohol is contraindicated since it can result in liver toxicity and significantly prolong the elimination of the drug by many years.
Griseofulvin Counseling:  I discussed with the patient the risks of griseofulvin including but not limited to photosensitivity, cytopenia, liver damage, nausea/vomiting and severe allergy.  The patient understands that this medication is best absorbed when taken with a fatty meal (e.g., ice cream or french fries).
Hydroxychloroquine Counseling:  I discussed with the patient that a baseline ophthalmologic exam is needed at the start of therapy and every year thereafter while on therapy. A CBC may also be warranted for monitoring.  The side effects of this medication were discussed with the patient, including but not limited to agranulocytosis, aplastic anemia, seizures, rashes, retinopathy, and liver toxicity. Patient instructed to call the office should any adverse effect occur.  The patient verbalized understanding of the proper use and possible adverse effects of Plaquenil.  All the patient's questions and concerns were addressed.
Acitretin Pregnancy And Lactation Text: This medication is Pregnancy Category X and should not be given to women who are pregnant or may become pregnant in the future. This medication is excreted in breast milk.
Valtrex Counseling: I discussed with the patient the risks of valacyclovir including but not limited to kidney damage, nausea, vomiting and severe allergy.  The patient understands that if the infection seems to be worsening or is not improving, they are to call.
Ivermectin Counseling:  Patient instructed to take medication on an empty stomach with a full glass of water.  Patient informed of potential adverse effects including but not limited to nausea, diarrhea, dizziness, itching, and swelling of the extremities or lymph nodes.  The patient verbalized understanding of the proper use and possible adverse effects of ivermectin.  All of the patient's questions and concerns were addressed.
Tazorac Pregnancy And Lactation Text: This medication is not safe during pregnancy. It is unknown if this medication is excreted in breast milk.
Topical Clindamycin Counseling: Patient counseled that this medication may cause skin irritation or allergic reactions.  In the event of skin irritation, the patient was advised to reduce the amount of the drug applied or use it less frequently.   The patient verbalized understanding of the proper use and possible adverse effects of clindamycin.  All of the patient's questions and concerns were addressed.
Eucrisa Counseling: Patient may experience a mild burning sensation during topical application. Eucrisa is not approved in children less than 2 years of age.
Quinolones Counseling:  I discussed with the patient the risks of fluoroquinolones including but not limited to GI upset, allergic reaction, drug rash, diarrhea, dizziness, photosensitivity, yeast infections, liver function test abnormalities, tendonitis/tendon rupture.
Infliximab Counseling:  I discussed with the patient the risks of infliximab including but not limited to myelosuppression, immunosuppression, autoimmune hepatitis, demyelinating diseases, lymphoma, and serious infections.  The patient understands that monitoring is required including a PPD at baseline and must alert us or the primary physician if symptoms of infection or other concerning signs are noted.
Griseofulvin Pregnancy And Lactation Text: This medication is Pregnancy Category X and is known to cause serious birth defects. It is unknown if this medication is excreted in breast milk but breast feeding should be avoided.
Finasteride Pregnancy And Lactation Text: This medication is absolutely contraindicated during pregnancy. It is unknown if it is excreted in breast milk.
Rhofade Pregnancy And Lactation Text: This medication has not been assigned a Pregnancy Risk Category. It is unknown if the medication is excreted in breast milk.
Dutasteride Pregnancy And Lactation Text: This medication is absolutely contraindicated in women, especially during pregnancy and breast feeding. Feminization of male fetuses is possible if taking while pregnant.
Opioid Pregnancy And Lactation Text: These medications can lead to premature delivery and should be avoided during pregnancy. These medications are also present in breast milk in small amounts.
Calcipotriene Pregnancy And Lactation Text: This medication has not been proven safe during pregnancy. It is unknown if this medication is excreted in breast milk.
Tranexamic Acid Counseling:  Patient advised of the small risk of bleeding problems with tranexamic acid. They were also instructed to call if they developed any nausea, vomiting or diarrhea. All of the patient's questions and concerns were addressed.
Propranolol Counseling:  I discussed with the patient the risks of propranolol including but not limited to low heart rate, low blood pressure, low blood sugar, restlessness and increased cold sensitivity. They should call the office if they experience any of these side effects.
Calcipotriene Counseling:  I discussed with the patient the risks of calcipotriene including but not limited to erythema, scaling, itching, and irritation.
Winlevi Pregnancy And Lactation Text: This medication is considered safe during pregnancy and breastfeeding.
Azelaic Acid Counseling: Patient counseled that medicine may cause skin irritation and to avoid applying near the eyes.  In the event of skin irritation, the patient was advised to reduce the amount of the drug applied or use it less frequently.   The patient verbalized understanding of the proper use and possible adverse effects of azelaic acid.  All of the patient's questions and concerns were addressed.
Libtayo Pregnancy And Lactation Text: This medication is contraindicated in pregnancy and when breast feeding.
Propranolol Pregnancy And Lactation Text: This medication is Pregnancy Category C and it isn't known if it is safe during pregnancy. It is excreted in breast milk.
Opioid Counseling: I discussed with the patient the potential side effects of opioids including but not limited to addiction, altered mental status, and depression. I stressed avoiding alcohol, benzodiazepines, muscle relaxants and sleep aids unless specifically okayed by a physician. The patient verbalized understanding of the proper use and possible adverse effects of opioids. All of the patient's questions and concerns were addressed. They were instructed to flush the remaining pills down the toilet if they did not need them for pain.
Tranexamic Acid Pregnancy And Lactation Text: It is unknown if this medication is safe during pregnancy or breast feeding.
Topical Ketoconazole Counseling: Patient counseled that this medication may cause skin irritation or allergic reactions.  In the event of skin irritation, the patient was advised to reduce the amount of the drug applied or use it less frequently.   The patient verbalized understanding of the proper use and possible adverse effects of ketoconazole.  All of the patient's questions and concerns were addressed.
Wartpeel Counseling:  I discussed with the patient the risks of Wartpeel including but not limited to erythema, scaling, itching, weeping, crusting, and pain.
Oral Minoxidil Pregnancy And Lactation Text: This medication should only be used when clearly needed if you are pregnant, attempting to become pregnant or breast feeding.
Niacinamide Pregnancy And Lactation Text: These medications are considered safe during pregnancy.
Dutasteride Counseling: Dustasteride Counseling:  I discussed with the patient the risks of use of dutasteride including but not limited to decreased libido, decreased ejaculate volume, and gynecomastia. Women who can become pregnant should not handle medication.  All of the patient's questions and concerns were addressed.
Mirvaso Counseling: Mirvaso is a topical medication which can decrease superficial blood flow where applied. Side effects are uncommon and include stinging, redness and allergic reactions.
Rhofade Counseling: Rhofade is a topical medication which can decrease superficial blood flow where applied. Side effects are uncommon and include stinging, redness and allergic reactions.
Oral Minoxidil Counseling- I discussed with the patient the risks of oral minoxidil including but not limited to shortness of breath, swelling of the feet or ankles, dizziness, lightheadedness, unwanted hair growth and allergic reaction.  The patient verbalized understanding of the proper use and possible adverse effects of oral minoxidil.  All of the patient's questions and concerns were addressed.
Winlevi Counseling:  I discussed with the patient the risks of topical clascoterone including but not limited to erythema, scaling, itching, and stinging. Patient voiced their understanding.
Niacinamide Counseling: I recommended taking niacin or niacinamide, also know as vitamin B3, twice daily. Recent evidence suggests that taking vitamin B3 (500 mg twice daily) can reduce the risk of actinic keratoses and non-melanoma skin cancers. Side effects of vitamin B3 include flushing and headache.
Finasteride Counseling:  I discussed with the patient the risks of use of finasteride including but not limited to decreased libido, decreased ejaculate volume, gynecomastia, and depression. Women should not handle medication.  All of the patient's questions and concerns were addressed.
Libtayo Counseling- I discussed with the patient the risks of Libtayo including but not limited to nausea, vomiting, diarrhea, and bone or muscle pain.  The patient verbalized understanding of the proper use and possible adverse effects of Libtayo.  All of the patient's questions and concerns were addressed.
Itraconazole Counseling:  I discussed with the patient the risks of itraconazole including but not limited to liver damage, nausea/vomiting, neuropathy, and severe allergy.  The patient understands that this medication is best absorbed when taken with acidic beverages such as non-diet cola or ginger ale.  The patient understands that monitoring is required including baseline LFTs and repeat LFTs at intervals.  The patient understands that they are to contact us or the primary physician if concerning signs are noted.
Xolair Counseling:  Patient informed of potential adverse effects including but not limited to fever, muscle aches, rash and allergic reactions.  The patient verbalized understanding of the proper use and possible adverse effects of Xolair.  All of the patient's questions and concerns were addressed.
Bexarotene Counseling:  I discussed with the patient the risks of bexarotene including but not limited to hair loss, dry lips/skin/eyes, liver abnormalities, hyperlipidemia, pancreatitis, depression/suicidal ideation, photosensitivity, drug rash/allergic reactions, hypothyroidism, anemia, leukopenia, infection, cataracts, and teratogenicity.  Patient understands that they will need regular blood tests to check lipid profile, liver function tests, white blood cell count, thyroid function tests and pregnancy test if applicable.
Nsaids Counseling: NSAID Counseling: I discussed with the patient that NSAIDs should be taken with food. Prolonged use of NSAIDs can result in the development of stomach ulcers.  Patient advised to stop taking NSAIDs if abdominal pain occurs.  The patient verbalized understanding of the proper use and possible adverse effects of NSAIDs.  All of the patient's questions and concerns were addressed.
Rituxan Counseling:  I discussed with the patient the risks of Rituxan infusions. Side effects can include infusion reactions, severe drug rashes including mucocutaneous reactions, reactivation of latent hepatitis and other infections and rarely progressive multifocal leukoencephalopathy.  All of the patient's questions and concerns were addressed.
Topical Sulfur Applications Counseling: Topical Sulfur Counseling: Patient counseled that this medication may cause skin irritation or allergic reactions.  In the event of skin irritation, the patient was advised to reduce the amount of the drug applied or use it less frequently.   The patient verbalized understanding of the proper use and possible adverse effects of topical sulfur application.  All of the patient's questions and concerns were addressed.
Hydroquinone Counseling:  Patient advised that medication may result in skin irritation, lightening (hypopigmentation), dryness, and burning.  In the event of skin irritation, the patient was advised to reduce the amount of the drug applied or use it less frequently.  Rarely, spots that are treated with hydroquinone can become darker (pseudoochronosis).  Should this occur, patient instructed to stop medication and call the office. The patient verbalized understanding of the proper use and possible adverse effects of hydroquinone.  All of the patient's questions and concerns were addressed.
Bactrim Counseling:  I discussed with the patient the risks of sulfa antibiotics including but not limited to GI upset, allergic reaction, drug rash, diarrhea, dizziness, photosensitivity, and yeast infections.  Rarely, more serious reactions can occur including but not limited to aplastic anemia, agranulocytosis, methemoglobinemia, blood dyscrasias, liver or kidney failure, lung infiltrates or desquamative/blistering drug rashes.
Ketoconazole Counseling:   Patient counseled regarding improving absorption with orange juice.  Adverse effects include but are not limited to breast enlargement, headache, diarrhea, nausea, upset stomach, liver function test abnormalities, taste disturbance, and stomach pain.  There is a rare possibility of liver failure that can occur when taking ketoconazole. The patient understands that monitoring of LFTs may be required, especially at baseline. The patient verbalized understanding of the proper use and possible adverse effects of ketoconazole.  All of the patient's questions and concerns were addressed.
Odomzo Counseling- I discussed with the patient the risks of Odomzo including but not limited to nausea, vomiting, diarrhea, constipation, weight loss, changes in the sense of taste, decreased appetite, muscle spasms, and hair loss.  The patient verbalized understanding of the proper use and possible adverse effects of Odomzo.  All of the patient's questions and concerns were addressed.
Azathioprine Counseling:  I discussed with the patient the risks of azathioprine including but not limited to myelosuppression, immunosuppression, hepatotoxicity, lymphoma, and infections.  The patient understands that monitoring is required including baseline LFTs, Creatinine, possible TPMP genotyping and weekly CBCs for the first month and then every 2 weeks thereafter.  The patient verbalized understanding of the proper use and possible adverse effects of azathioprine.  All of the patient's questions and concerns were addressed.
Siliq Counseling:  I discussed with the patient the risks of Siliq including but not limited to new or worsening depression, suicidal thoughts and behavior, immunosuppression, malignancy, posterior leukoencephalopathy syndrome, and serious infections.  The patient understands that monitoring is required including a PPD at baseline and must alert us or the primary physician if symptoms of infection or other concerning signs are noted. There is also a special program designed to monitor depression which is required with Siliq.
Bactrim Pregnancy And Lactation Text: This medication is Pregnancy Category D and is known to cause fetal risk.  It is also excreted in breast milk.
Zyclara Counseling:  I discussed with the patient the risks of imiquimod including but not limited to erythema, scaling, itching, weeping, crusting, and pain.  Patient understands that the inflammatory response to imiquimod is variable from person to person and was educated regarded proper titration schedule.  If flu-like symptoms develop, patient knows to discontinue the medication and contact us.
Imiquimod Counseling:  I discussed with the patient the risks of imiquimod including but not limited to erythema, scaling, itching, weeping, crusting, and pain.  Patient understands that the inflammatory response to imiquimod is variable from person to person and was educated regarded proper titration schedule.  If flu-like symptoms develop, patient knows to discontinue the medication and contact us.
Terbinafine Counseling: Patient counseling regarding adverse effects of terbinafine including but not limited to headache, diarrhea, rash, upset stomach, liver function test abnormalities, itching, taste/smell disturbance, nausea, abdominal pain, and flatulence.  There is a rare possibility of liver failure that can occur when taking terbinafine.  The patient understands that a baseline LFT and kidney function test may be required. The patient verbalized understanding of the proper use and possible adverse effects of terbinafine.  All of the patient's questions and concerns were addressed.
Cellcept Counseling:  I discussed with the patient the risks of mycophenolate mofetil including but not limited to infection/immunosuppression, GI upset, hypokalemia, hypercholesterolemia, bone marrow suppression, lymphoproliferative disorders, malignancy, GI ulceration/bleed/perforation, colitis, interstitial lung disease, kidney failure, progressive multifocal leukoencephalopathy, and birth defects.  The patient understands that monitoring is required including a baseline creatinine and regular CBC testing. In addition, patient must alert us immediately if symptoms of infection or other concerning signs are noted.
Cimzia Counseling:  I discussed with the patient the risks of Cimzia including but not limited to immunosuppression, allergic reactions and infections.  The patient understands that monitoring is required including a PPD at baseline and must alert us or the primary physician if symptoms of infection or other concerning signs are noted.
Otezla Counseling: The side effects of Otezla were discussed with the patient, including but not limited to worsening or new depression, weight loss, diarrhea, nausea, upper respiratory tract infection, and headache. Patient instructed to call the office should any adverse effect occur.  The patient verbalized understanding of the proper use and possible adverse effects of Otezla.  All the patient's questions and concerns were addressed.
Simponi Counseling:  I discussed with the patient the risks of golimumab including but not limited to myelosuppression, immunosuppression, autoimmune hepatitis, demyelinating diseases, lymphoma, and serious infections.  The patient understands that monitoring is required including a PPD at baseline and must alert us or the primary physician if symptoms of infection or other concerning signs are noted.
Cephalexin Pregnancy And Lactation Text: This medication is Pregnancy Category B and considered safe during pregnancy.  It is also excreted in breast milk but can be used safely for shorter doses.
Benzoyl Peroxide Counseling: Patient counseled that medicine may cause skin irritation and bleach clothing.  In the event of skin irritation, the patient was advised to reduce the amount of the drug applied or use it less frequently.   The patient verbalized understanding of the proper use and possible adverse effects of benzoyl peroxide.  All of the patient's questions and concerns were addressed.
Arava Counseling:  Patient counseled regarding adverse effects of Arava including but not limited to nausea, vomiting, abnormalities in liver function tests. Patients may develop mouth sores, rash, diarrhea, and abnormalities in blood counts. The patient understands that monitoring is required including LFTs and blood counts.  There is a rare possibility of scarring of the liver and lung problems that can occur when taking methotrexate. Persistent nausea, loss of appetite, pale stools, dark urine, cough, and shortness of breath should be reported immediately. Patient advised to discontinue Arava treatment and consult with a physician prior to attempting conception. The patient will have to undergo a treatment to eliminate Arava from the body prior to conception.
Cosentyx Counseling:  I discussed with the patient the risks of Cosentyx including but not limited to worsening of Crohn's disease, immunosuppression, allergic reactions and infections.  The patient understands that monitoring is required including a PPD at baseline and must alert us or the primary physician if symptoms of infection or other concerning signs are noted.
Skyrizi Counseling: I discussed with the patient the risks of risankizumab-rzaa including but not limited to immunosuppression, and serious infections.  The patient understands that monitoring is required including a PPD at baseline and must alert us or the primary physician if symptoms of infection or other concerning signs are noted.
Dapsone Counseling: I discussed with the patient the risks of dapsone including but not limited to hemolytic anemia, agranulocytosis, rashes, methemoglobinemia, kidney failure, peripheral neuropathy, headaches, GI upset, and liver toxicity.  Patients who start dapsone require monitoring including baseline LFTs and weekly CBCs for the first month, then every month thereafter.  The patient verbalized understanding of the proper use and possible adverse effects of dapsone.  All of the patient's questions and concerns were addressed.
Sarecycline Counseling: Patient advised regarding possible photosensitivity and discoloration of the teeth, skin, lips, tongue and gums.  Patient instructed to avoid sunlight, if possible.  When exposed to sunlight, patients should wear protective clothing, sunglasses, and sunscreen.  The patient was instructed to call the office immediately if the following severe adverse effects occur:  hearing changes, easy bruising/bleeding, severe headache, or vision changes.  The patient verbalized understanding of the proper use and possible adverse effects of sarecycline.  All of the patient's questions and concerns were addressed.
Birth Control Pills Counseling: Birth Control Pill Counseling: I discussed with the patient the potential side effects of OCPs including but not limited to increased risk of stroke, heart attack, thrombophlebitis, deep venous thrombosis, hepatic adenomas, breast changes, GI upset, headaches, and depression.  The patient verbalized understanding of the proper use and possible adverse effects of OCPs. All of the patient's questions and concerns were addressed.
Dupixent Counseling: I discussed with the patient the risks of dupilumab including but not limited to eye infection and irritation, cold sores, injection site reactions, worsening of asthma, allergic reactions and increased risk of parasitic infection.  Live vaccines should be avoided while taking dupilumab. Dupilumab will also interact with certain medications such as warfarin and cyclosporine. The patient understands that monitoring is required and they must alert us or the primary physician if symptoms of infection or other concerning signs are noted.
Doxycycline Counseling:  Patient counseled regarding possible photosensitivity and increased risk for sunburn.  Patient instructed to avoid sunlight, if possible.  When exposed to sunlight, patients should wear protective clothing, sunglasses, and sunscreen.  The patient was instructed to call the office immediately if the following severe adverse effects occur:  hearing changes, easy bruising/bleeding, severe headache, or vision changes.  The patient verbalized understanding of the proper use and possible adverse effects of doxycycline.  All of the patient's questions and concerns were addressed.
Protopic Counseling: Patient may experience a mild burning sensation during topical application. Protopic is not approved in children less than 2 years of age. There have been case reports of hematologic and skin malignancies in patients using topical calcineurin inhibitors although causality is questionable.
Stelara Counseling:  I discussed with the patient the risks of ustekinumab including but not limited to immunosuppression, malignancy, posterior leukoencephalopathy syndrome, and serious infections.  The patient understands that monitoring is required including a PPD at baseline and must alert us or the primary physician if symptoms of infection or other concerning signs are noted.
Doxepin Counseling:  Patient advised that the medication is sedating and not to drive a car after taking this medication. Patient informed of potential adverse effects including but not limited to dry mouth, urinary retention, and blurry vision.  The patient verbalized understanding of the proper use and possible adverse effects of doxepin.  All of the patient's questions and concerns were addressed.
Tetracycline Counseling: Patient counseled regarding possible photosensitivity and increased risk for sunburn.  Patient instructed to avoid sunlight, if possible.  When exposed to sunlight, patients should wear protective clothing, sunglasses, and sunscreen.  The patient was instructed to call the office immediately if the following severe adverse effects occur:  hearing changes, easy bruising/bleeding, severe headache, or vision changes.  The patient verbalized understanding of the proper use and possible adverse effects of tetracycline.  All of the patient's questions and concerns were addressed. Patient understands to avoid pregnancy while on therapy due to potential birth defects.
Spironolactone Counseling: Patient advised regarding risks of diarrhea, abdominal pain, hyperkalemia, birth defects (for female patients), liver toxicity and renal toxicity. The patient may need blood work to monitor liver and kidney function and potassium levels while on therapy. The patient verbalized understanding of the proper use and possible adverse effects of spironolactone.  All of the patient's questions and concerns were addressed.
Erivedge Counseling- I discussed with the patient the risks of Erivedge including but not limited to nausea, vomiting, diarrhea, constipation, weight loss, changes in the sense of taste, decreased appetite, muscle spasms, and hair loss.  The patient verbalized understanding of the proper use and possible adverse effects of Erivedge.  All of the patient's questions and concerns were addressed.
Taltz Counseling: I discussed with the patient the risks of ixekizumab including but not limited to immunosuppression, serious infections, worsening of inflammatory bowel disease and drug reactions.  The patient understands that monitoring is required including a PPD at baseline and must alert us or the primary physician if symptoms of infection or other concerning signs are noted.
Enbrel Counseling:  I discussed with the patient the risks of etanercept including but not limited to myelosuppression, immunosuppression, autoimmune hepatitis, demyelinating diseases, lymphoma, and infections.  The patient understands that monitoring is required including a PPD at baseline and must alert us or the primary physician if symptoms of infection or other concerning signs are noted.
Colchicine Counseling:  Patient counseled regarding adverse effects including but not limited to stomach upset (nausea, vomiting, stomach pain, or diarrhea).  Patient instructed to limit alcohol consumption while taking this medication.  Colchicine may reduce blood counts especially with prolonged use.  The patient understands that monitoring of kidney function and blood counts may be required, especially at baseline. The patient verbalized understanding of the proper use and possible adverse effects of colchicine.  All of the patient's questions and concerns were addressed.
Methotrexate Counseling:  Patient counseled regarding adverse effects of methotrexate including but not limited to nausea, vomiting, abnormalities in liver function tests. Patients may develop mouth sores, rash, diarrhea, and abnormalities in blood counts. The patient understands that monitoring is required including LFT's and blood counts.  There is a rare possibility of scarring of the liver and lung problems that can occur when taking methotrexate. Persistent nausea, loss of appetite, pale stools, dark urine, cough, and shortness of breath should be reported immediately. Patient advised to discontinue methotrexate treatment at least three months before attempting to become pregnant.  I discussed the need for folate supplements while taking methotrexate.  These supplements can decrease side effects during methotrexate treatment. The patient verbalized understanding of the proper use and possible adverse effects of methotrexate.  All of the patient's questions and concerns were addressed.
Hydroxyzine Counseling: Patient advised that the medication is sedating and not to drive a car after taking this medication.  Patient informed of potential adverse effects including but not limited to dry mouth, urinary retention, and blurry vision.  The patient verbalized understanding of the proper use and possible adverse effects of hydroxyzine.  All of the patient's questions and concerns were addressed.
Humira Counseling:  I discussed with the patient the risks of adalimumab including but not limited to myelosuppression, immunosuppression, autoimmune hepatitis, demyelinating diseases, lymphoma, and serious infections.  The patient understands that monitoring is required including a PPD at baseline and must alert us or the primary physician if symptoms of infection or other concerning signs are noted.
Prednisone Counseling:  I discussed with the patient the risks of prolonged use of prednisone including but not limited to weight gain, insomnia, osteoporosis, mood changes, diabetes, susceptibility to infection, glaucoma and high blood pressure.  In cases where prednisone use is prolonged, patients should be monitored with blood pressure checks, serum glucose levels and an eye exam.  Additionally, the patient may need to be placed on GI prophylaxis, PCP prophylaxis, and calcium and vitamin D supplementation and/or a bisphosphonate.  The patient verbalized understanding of the proper use and the possible adverse effects of prednisone.  All of the patient's questions and concerns were addressed.
Tremfya Counseling: I discussed with the patient the risks of guselkumab including but not limited to immunosuppression, serious infections, worsening of inflammatory bowel disease and drug reactions.  The patient understands that monitoring is required including a PPD at baseline and must alert us or the primary physician if symptoms of infection or other concerning signs are noted.
Topical Retinoid counseling:  Patient advised to apply a pea-sized amount only at bedtime and wait 30 minutes after washing their face before applying.  If too drying, patient may add a non-comedogenic moisturizer. The patient verbalized understanding of the proper use and possible adverse effects of retinoids.  All of the patient's questions and concerns were addressed.
Fluconazole Counseling:  Patient counseled regarding adverse effects of fluconazole including but not limited to headache, diarrhea, nausea, upset stomach, liver function test abnormalities, taste disturbance, and stomach pain.  There is a rare possibility of liver failure that can occur when taking fluconazole.  The patient understands that monitoring of LFTs and kidney function test may be required, especially at baseline. The patient verbalized understanding of the proper use and possible adverse effects of fluconazole.  All of the patient's questions and concerns were addressed.
Metronidazole Pregnancy And Lactation Text: This medication is Pregnancy Category B and considered safe during pregnancy.  It is also excreted in breast milk.
Minocycline Counseling: Patient advised regarding possible photosensitivity and discoloration of the teeth, skin, lips, tongue and gums.  Patient instructed to avoid sunlight, if possible.  When exposed to sunlight, patients should wear protective clothing, sunglasses, and sunscreen.  The patient was instructed to call the office immediately if the following severe adverse effects occur:  hearing changes, easy bruising/bleeding, severe headache, or vision changes.  The patient verbalized understanding of the proper use and possible adverse effects of minocycline.  All of the patient's questions and concerns were addressed.
Elidel Counseling: Patient may experience a mild burning sensation during topical application. Elidel is not approved in children less than 2 years of age. There have been case reports of hematologic and skin malignancies in patients using topical calcineurin inhibitors although causality is questionable.
Albendazole Counseling:  I discussed with the patient the risks of albendazole including but not limited to cytopenia, kidney damage, nausea/vomiting and severe allergy.  The patient understands that this medication is being used in an off-label manner.
Tazorac Counseling:  Patient advised that medication is irritating and drying.  Patient may need to apply sparingly and wash off after an hour before eventually leaving it on overnight.  The patient verbalized understanding of the proper use and possible adverse effects of tazorac.  All of the patient's questions and concerns were addressed.
Ilumya Counseling: I discussed with the patient the risks of tildrakizumab including but not limited to immunosuppression, malignancy, posterior leukoencephalopathy syndrome, and serious infections.  The patient understands that monitoring is required including a PPD at baseline and must alert us or the primary physician if symptoms of infection or other concerning signs are noted.
Xeljanz Counseling: I discussed with the patient the risks of Xeljanz therapy including increased risk of infection, liver issues, headache, diarrhea, or cold symptoms. Live vaccines should be avoided. They were instructed to call if they have any problems.
Xelnickyz Pregnancy And Lactation Text: This medication is Pregnancy Category D and is not considered safe during pregnancy.  The risk during breast feeding is also uncertain.
Acitretin Counseling:  I discussed with the patient the risks of acitretin including but not limited to hair loss, dry lips/skin/eyes, liver damage, hyperlipidemia, depression/suicidal ideation, photosensitivity.  Serious rare side effects can include but are not limited to pancreatitis, pseudotumor cerebri, bony changes, clot formation/stroke/heart attack.  Patient understands that alcohol is contraindicated since it can result in liver toxicity and significantly prolong the elimination of the drug by many years.
Griseofulvin Counseling:  I discussed with the patient the risks of griseofulvin including but not limited to photosensitivity, cytopenia, liver damage, nausea/vomiting and severe allergy.  The patient understands that this medication is best absorbed when taken with a fatty meal (e.g., ice cream or french fries).
Hydroxychloroquine Counseling:  I discussed with the patient that a baseline ophthalmologic exam is needed at the start of therapy and every year thereafter while on therapy. A CBC may also be warranted for monitoring.  The side effects of this medication were discussed with the patient, including but not limited to agranulocytosis, aplastic anemia, seizures, rashes, retinopathy, and liver toxicity. Patient instructed to call the office should any adverse effect occur.  The patient verbalized understanding of the proper use and possible adverse effects of Plaquenil.  All the patient's questions and concerns were addressed.
Valtrex Counseling: I discussed with the patient the risks of valacyclovir including but not limited to kidney damage, nausea, vomiting and severe allergy.  The patient understands that if the infection seems to be worsening or is not improving, they are to call.
Ivermectin Counseling:  Patient instructed to take medication on an empty stomach with a full glass of water.  Patient informed of potential adverse effects including but not limited to nausea, diarrhea, dizziness, itching, and swelling of the extremities or lymph nodes.  The patient verbalized understanding of the proper use and possible adverse effects of ivermectin.  All of the patient's questions and concerns were addressed.
Topical Clindamycin Counseling: Patient counseled that this medication may cause skin irritation or allergic reactions.  In the event of skin irritation, the patient was advised to reduce the amount of the drug applied or use it less frequently.   The patient verbalized understanding of the proper use and possible adverse effects of clindamycin.  All of the patient's questions and concerns were addressed.
Eucrisa Counseling: Patient may experience a mild burning sensation during topical application. Eucrisa is not approved in children less than 2 years of age.
Infliximab Counseling:  I discussed with the patient the risks of infliximab including but not limited to myelosuppression, immunosuppression, autoimmune hepatitis, demyelinating diseases, lymphoma, and serious infections.  The patient understands that monitoring is required including a PPD at baseline and must alert us or the primary physician if symptoms of infection or other concerning signs are noted.
Azelaic Acid Counseling: Patient counseled that medicine may cause skin irritation and to avoid applying near the eyes.  In the event of skin irritation, the patient was advised to reduce the amount of the drug applied or use it less frequently.   The patient verbalized understanding of the proper use and possible adverse effects of azelaic acid.  All of the patient's questions and concerns were addressed.
Topical Ketoconazole Counseling: Patient counseled that this medication may cause skin irritation or allergic reactions.  In the event of skin irritation, the patient was advised to reduce the amount of the drug applied or use it less frequently.   The patient verbalized understanding of the proper use and possible adverse effects of ketoconazole.  All of the patient's questions and concerns were addressed.
Dutasteride Male Counseling: Dustasteride Counseling:  I discussed with the patient the risks of use of dutasteride including but not limited to decreased libido, decreased ejaculate volume, and gynecomastia. Women who can become pregnant should not handle medication.  All of the patient's questions and concerns were addressed.
Mirvaso Counseling: Mirvaso is a topical medication which can decrease superficial blood flow where applied. Side effects are uncommon and include stinging, redness and allergic reactions.
Oral Minoxidil Counseling- I discussed with the patient the risks of oral minoxidil including but not limited to shortness of breath, swelling of the feet or ankles, dizziness, lightheadedness, unwanted hair growth and allergic reaction.  The patient verbalized understanding of the proper use and possible adverse effects of oral minoxidil.  All of the patient's questions and concerns were addressed.
Finasteride Male Counseling: Finasteride Counseling:  I discussed with the patient the risks of use of finasteride including but not limited to decreased libido, decreased ejaculate volume, gynecomastia, and depression. Women should not handle medication.  All of the patient's questions and concerns were addressed.
Libtayo Counseling- I discussed with the patient the risks of Libtayo including but not limited to nausea, vomiting, diarrhea, and bone or muscle pain.  The patient verbalized understanding of the proper use and possible adverse effects of Libtayo.  All of the patient's questions and concerns were addressed.

## 2022-11-10 NOTE — PROCEDURE: PRESCRIPTION MEDICATION MANAGEMENT
Render In Strict Bullet Format?: No
Detail Level: Zone
Continue Regimen: Systane eye drops QD \\nOlux 0.05% foam BID PRN flares\\nKetoconazole 2% shampoo BIW-TIW\\nKetoconazole 2% cream BID to ears\\nAllegra QAM (continue)\\nClaritin QHS (continue)\\nProtopic ointment 0.1% QD-BID to ears\\nMimyx BID to ears\\nVytone cream BID PRN flares to ears\\nOpzelura cream BID PRN (pt thinks refractory)
Modify Regimen: Increase Dupixent 300mg SC to every 1 week
Plan: Patient should avoid all MCN related drugs. Patient previously discontinued Ximino 90mg 1 PO QD with dinner (due to DRESS syndrome) and Oracea 40mg 1 PO QD with dinner (due to possible cross reaction). Patient saw ophthalmologist and restarted doxycycline 50mg QD. Discussed low potential of cross reactivity with doxycycline. Discussed possible resistance development with antibiotic dosing (50mg), previously recommended switching to doxycycline 20mg BID. Doxy 20mg not covered by insurance, so patient is back on doxy 50mg 1 PO QD. Symptoms are stable.
Continue Regimen: Doxycycline 50mg 1 PO QD with food
Continue Regimen: Protopic 0.1% ointment BID \\nMimyx cream QD as barrier\\nOlux 0.05% foam BID PRN flares\\nVytone cream BID PRN flares
Plan: Pt reports that on rinvoq the itching on the ears was significantly reduced. Discussed ILK injections with patient, she is open to try. Patient will try Dermeleve cream and remedy or glove in a bottle. Discussed patch testing with patient. She will consider.
Samples Given: Dermeleve cream
Continue Regimen: Dupixent 300mg SC every 2 weeks\\nSystane eye drops QD \\nOlux 0.05% foam BID PRN flares\\nKetoconazole 2% shampoo BIW-TIW\\nKetoconazole 2% cream BID to ears\\nAllegra QAM (continue)\\nClaritin QHS (continue)\\nProtopic ointment 0.1% QD-BID to ears\\nMimyx BID to ears\\nVytone cream BID PRN flares to ears\\nOpzelura cream BID PRN (pt thinks refractory)
Samples Given: Vtama
Plan: Pt reports that on rinvoq the itching on the ears was significantly reduced.
Initiate Treatment: See topicals above.

## 2022-11-10 NOTE — PROCEDURE: DUPIXENT MONITORING
Detail Level: Zone
Length Of Therapy: 3 months
Comments: First dupixent start 9/1/20-5/22/22. Restart : 8/5/22
Add High Risk Medication Management Associated Diagnosis?: No
Length Of Therapy: 1 month
Comments: First dupixent start 9/1/20-5/22/22. Restart : 8/5/22

## 2022-11-10 NOTE — PROCEDURE: INTRALESIONAL KENALOG
X Size Of Lesion In Cm (Optional): 0
Administered By (Optional): [facility]
Include Z78.9 (Other Specified Conditions Influencing Health Status) As An Associated Diagnosis?: Yes
Medical Necessity Clause: This procedure was medically necessary because the lesions that were treated were:
Total Volume Injected (Ccs- Only Use Numbers And Decimals): 0.2
Which Kenalog Vial Was Used?: Kenalog 10 mg/ml (5 ml vial)
Kenalog Preparation: Kenalog in bacteriostatic water
Concentration Of Solution Injected (Mg/Ml): 10.0
Treatment Number (Optional): 1
Consent: The risks of atrophy were reviewed with the patient.
Detail Level: Detailed

## 2022-11-10 NOTE — PROCEDURE: CHRONOLOGY OF PRESENT ILLNESS
Chronology Of Present Illness: 8/8/2019:  Rash, located on the right ear and scalp. The rash is scaly, red, and itchy. The rash has been present for years. She has arthritis. She is not currently on any medications. She was started on Olux foam and ketoconazole cream.\\n11/17/2019: scalp is improved, ears are not. 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 She was started on hydrocortisone butuyrate and ketoconazole for the underarms. Biopsy obtained (eczematous dermatitis)\\n1/3/2020: Axillae have improved. scalp and ears as well. Continue current topicals and Pramosone lotion was added for the underarms.\\n2/5/2020: drastic improvement of axillae. She discontinued Hydrocortisone cream and is only using ketoconazole cream. scalp and ears are stable. Start Elidel cream to ears. \\n3/25/2020: scalp and ears not improved. Discussed Dupixent with patient. Consider after Cn19. Continue current topicals.\\n5/27/2020: scalp still flaring. Punch bx obtained of neck. (folliculitis, but not c/w clinical symptoms and presentation. Favor eczematous derm/AD. Continue current topicals. \\n8/4/2020: rash not well controlled on topicals. Start Dupixent enrollment. BSA 4, SEVERE. IGA score 4. Full biologic labs obtained. RX Valtrex given.\\n9/1/2020: Dupixent start today. Recommend Systane eyedrops and Valtrex PRN as needed. \\n10/6/2020: Patient is somewhat improved, overall itching is better. Her ears are still very itchy. She is using Olux foam and ketoconazole cream to ears. She complains of new onset of joint pain above elbows and shoulders. She will see Dr. Dai and we will send letter.\\n11/3/2020: patient is improved overall. She still complains of itchy ears. Recommend talking to her audiologist to see if there are other hearing aids that are made of different material. RX Mimyx given for barrier cream. Continue Protopic 0.1% ointment QD PRN to ears and other topicals. Continue Dupixent injections. IGA score 1\\n12/9/2020: patient is much better. Ears are better. Hearing aid manufacturers told her that hearing aids are made out of medical grade hypoallergenic silicone. Itching has improved. Continue current topicals, antihistamines and Dupixent. Start Zonalon cream to ears for itching. Lab order given to patient today. Discussed that injection site reactions should subside.\\n3/17/21: eyes are very bothersome. Discussed possible component of rosacea. Rec warm rice sock compresses in addition to the Systane eye drops. For now continue Dupixent and topicals.\\n4/19/21: Eyes are still dry and bothersome. She states she will be seeing a dry eye specialist on Wednesday. She reports continued itching in the ears. Recommend not making too many changes to treatment regimen due to patient?s upcoming appt with dry eye specialist. Pramosone cream prescribed today for itching in ears, briefly discussed R/B/SE of gabapentin to consider starting at NOV if itching does not improve.\\n7/19/21: stable. Continue Dupixent Q 2 weeks and topicals. Recently diagnosed with gastroenteritis and possible sepsis. 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 Continue Dupixent and all topicals as needed.\\n1/19/22: patient is stable. Continue Dupixent and topicals PRN.\\n4/20/22: her itching (due to oak pollen) starts 3-4 days prior to next injection. Discussed Rinvoque with patient. Discussed it controls the itching better. Discussed risks, benefits and side effects with patient (increase in malignancies, cardio vascular events, blood clots, DVT - usually associated with prior underlying smoking history or other diseases). Discussed injecting Dupixent Q10 days vs Q14 days. Patient desires to continue Dupixent at shorter intervals. Consider adding on Tim inhibitor during environmental allergy season in the future.\\n5/18/22: Patient reports itching is minimally improved on modified Dupixent dosing (every 9-10 days). She reports worst areas are on the arms and inside ears. Re-discussed R/B/SE of Rinvoq in detail today. Patient reports worst 7/10 itch. Patient reports no personal hx of cancer or blood clots. She reports hx of migraines. Patient reports worsened eye dryness since starting Dupixent therapy. She is agreeable to start Rinvoq, insurance coverage check initiated today. Patient to obtain repeat full biologics labs, lab slip provided to patient to get drawn ASAP (MUST be fasting). She is due for her next Dupixent injection this Sunday. She has not been vaccinated for shingles, recommend getting shingles vaccine first ASAP, then inject Dupixent as scheduled on Sunday. F/u in 2 weeks. Sample saved for pt, plan to start therapy at NOV.\\n5/31/22: Patient received shingles vaccine right after LOV. Last Dupixent injection 5/22/22. Patient is agreeable to start Rinvoq today. Rinvoq PA denied, will submit appeal with SCORAD score (39). Symptoms are worsening, causing intense itchiness and sleeplessness. Start Rinvoq today, samples provided in office.\\n7/5/22: Patient reports itching was much better when taking Rinvoq. Decreased itch factor from 6-7, down to a 2-3. Patient reports light headedness, nausea, indigestion, twitching in her chest while taking Rinvoq. She stopped it a couple days ago due to symptoms. She is still recovering from the side effects. Recommend starting Opzelura at this point. Consider restarting Rinvoq every other day once symptoms have resolved. Discussed Vtama with patient as well if Opzelura is not improving her symptoms.\\n8/5/22: Pt reports itching has not improved since starting opzelura. Pt to restart dupixent with loading dose after todays OV, sample given to Pt (1 300mg SC injection, pt states she has 1 pen at home). Rx sent to [facility].\\n9/2/22: pt states doing a little better after restarting Dupixent. Minor dry eyes and using Vytone and Olux foam topically currently. Will give pt Vtama sample today. Discussed considering restarting Rinvoq in future at lower dose. Patient states it worked the best but she complained of N/V and mild palpitations.\\n11/10/22: unimproved. First week after injection is good, then she itches again. Using a variety of topicals. Recommend trying Dermeleve cream and Remedy or glove in a bottle. Samples given. She will try injecting every week (supported by samples) x 1 month.
Detail Level: Zone
Chronology Of Present Illness: 8/8/2019:  Rash, located on the right ear and scalp. The rash is scaly, red, and itchy. The rash has been present for years. She has arthritis. She is not currently on any medications. She was started on Olux foam and ketoconazole cream.\\n11/17/2019: scalp is improved, ears are not. 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 She was started on hydrocortisone butuyrate and ketoconazole for the underarms. Biopsy obtained (eczematous dermatitis)\\n1/3/2020: Axillae have improved. scalp and ears as well. Continue current topicals and Pramosone lotion was added for the underarms.\\n2/5/2020: drastic improvement of axillae. She discontinued Hydrocortisone cream and is only using ketoconazole cream. scalp and ears are stable. Start Elidel cream to ears. \\n3/25/2020: scalp and ears not improved. Discussed Dupixent with patient. Consider after Cn19. Continue current topicals.\\n5/27/2020: scalp still flaring. Punch bx obtained of neck. (folliculitis, but not c/w clinical symptoms and presentation. Favor eczematous derm/AD. Continue current topicals. \\n8/4/2020: rash not well controlled on topicals. Start Dupixent enrollment. IGA score 4. Full biologic labs obtained. RX Valtrex given.\\n9/1/2020: Dupixent start today. Recommend Systane eyedrops and Valtrex PRN as needed. \\n10/6/2020: Patient is somewhat improved, overall itching is better. Her ears are still very itchy. She is using Olux foam and ketoconazole cream to ears. She complains of new onset of joint pain above elbows and shoulders. She will see Dr. Dai and we will send letter.\\n11/3/2020: patient is improved overall. She still complains of itchy ears. Recommend talking to her audiologist to see if there are other hearing aids that are made of different material. RX Mimyx given for barrier cream. Continue Protopic 0.1% ointment QD PRN to ears and other topicals. Continue Dupixent injections. IGA score 1\\n12/9/2020: patient is much better. Ears are better. Hearing aid manufacturers told her that hearing aids are made out of medical grade hypoallergenic silicone. Itching has improved. Continue current topicals, antihistamines and Dupixent. Start Zonalon cream to ears for itching. Lab order given to patient today. Discussed that injection site reactions should subside.\\n3/17/21: eyes are very bothersome. Discussed possible component of rosacea. Rec warm rice sock compresses in addition to the Systane eye drops. For now continue Dupixent and topicals.\\n4/19/21: Eyes are still dry and bothersome. She states she will be seeing a dry eye specialist on Wednesday. She reports continued itching in the ears. Recommend not making too many changes to treatment regimen due to patient?s upcoming appt with dry eye specialist. Pramosone cream prescribed today for itching in ears, briefly discussed R/B/SE of gabapentin to consider starting at NOV if itching does not improve.\\n7/19/21: stable. Continue Dupixent Q 2 weeks and topicals. Recently diagnosed with gastroenteritis and possible sepsis. 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 Continue Dupixent and all topicals as needed.\\n1/19/22: patient is stable. Continue Dupixent and topicals PRN.\\n4/20/22: her itching (due to oak pollen) starts 3-4 days prior to next injection. Discussed Rinvoque with patient. Discussed it controls the itching better. Discussed risks, benefits and side effects with patient (increase in malignancies, cardio vascular events, blood clots, DVT - usually associated with prior underlying smoking history or other diseases). Discussed injecting Dupixent Q10 days vs Q14 days. Patient desires to continue Dupixent at shorter intervals. Consider adding on Tim inhibitor during environmental allergy season in the future.\\n5/18/22: Patient reports itching is minimally improved on modified Dupixent dosing (every 9-10 days). She reports worst areas are on the arms and inside ears. Re-discussed R/B/SE of Rinvoq in detail today. Patient reports worst 7/10 itch. Patient reports no personal hx of cancer or blood clots. She reports hx of migraines. Patient reports worsened eye dryness since starting Dupixent therapy. She is agreeable to start Rinvoq, insurance coverage check initiated today. Patient to obtain repeat full biologics labs, lab slip provided to patient to get drawn ASAP (MUST be fasting). She is due for her next Dupixent injection this Sunday. She has not been vaccinated for shingles, recommend getting shingles vaccine first ASAP, then inject Dupixent as scheduled on Sunday. F/u in 2 weeks. Sample saved for pt, plan to start therapy at NOV.\\n5/31/22: Patient received shingles vaccine right after LOV. Last Dupixent injection 5/22/22. Patient is agreeable to start Rinvoq today. Rinvoq PA denied, will submit appeal with SCORAD score (39). Symptoms are worsening, causing intense itchiness and sleeplessness. Start Rinvoq today, samples provided in office.\\n7/5/22: Patient reports itching was much better when taking Rinvoq. Decreased itch factor from 6-7, down to a 2-3. Patient reports light headedness, nausea, indigestion, twitching in her chest while taking Rinvoq. She stopped it a couple days ago due to symptoms. She is still recovering from the side effects. Recommend starting Opzelura at this point. Consider restarting Rinvoq every other day once symptoms have resolved. Discussed Vtama with patient as well if Opzelura is not improving her symptoms.\\n8/5/22: Pt reports itching has not improved since starting opzelura. Pt to restart dupixent with loading dose after todays OV, sample given to Pt (1 300mg SC injection, pt states she has 1 pen at home). Rx sent to [facility].\\n9/2/22: pt states doing a little better after restarting Dupixent. Minor dry eyes and using Vytone and Olux foam topically currently. Will give pt Vtama sample today. Discussed considering restarting Rinvoq in future at lower dose. Patient states it worked the best but she complained of N/V and mild palpitations.

## 2022-11-21 RX ORDER — VALACYCLOVIR HYDROCHLORIDE 1 G/1
TABLET, FILM COATED ORAL
Qty: 12 | Refills: 3 | Status: ERX

## 2023-01-04 ENCOUNTER — APPOINTMENT (RX ONLY)
Dept: URBAN - METROPOLITAN AREA CLINIC 125 | Facility: CLINIC | Age: 58
Setting detail: DERMATOLOGY
End: 2023-01-04

## 2023-01-04 VITALS — SYSTOLIC BLOOD PRESSURE: 119 MMHG | DIASTOLIC BLOOD PRESSURE: 74 MMHG

## 2023-01-04 DIAGNOSIS — Z79.899 OTHER LONG TERM (CURRENT) DRUG THERAPY: ICD-10-CM

## 2023-01-04 DIAGNOSIS — L259 CONTACT DERMATITIS AND OTHER ECZEMA, UNSPECIFIED CAUSE: ICD-10-CM | Status: INADEQUATELY CONTROLLED

## 2023-01-04 DIAGNOSIS — L20.89 OTHER ATOPIC DERMATITIS: ICD-10-CM | Status: INADEQUATELY CONTROLLED

## 2023-01-04 PROBLEM — L23.9 ALLERGIC CONTACT DERMATITIS, UNSPECIFIED CAUSE: Status: ACTIVE | Noted: 2023-01-04

## 2023-01-04 PROCEDURE — ? OTHER

## 2023-01-04 PROCEDURE — ? PRESCRIPTION MEDICATION MANAGEMENT

## 2023-01-04 PROCEDURE — ? HIGH RISK MEDICATION MONITORING

## 2023-01-04 PROCEDURE — ? ADDITIONAL NOTES

## 2023-01-04 PROCEDURE — 99214 OFFICE O/P EST MOD 30 MIN: CPT

## 2023-01-04 PROCEDURE — ? DUPIXENT MONITORING

## 2023-01-04 PROCEDURE — ? CHRONOLOGY OF PRESENT ILLNESS

## 2023-01-04 PROCEDURE — ? PRESCRIPTION

## 2023-01-04 PROCEDURE — ? COUNSELING

## 2023-01-04 RX ORDER — PREDNISONE 10 MG/1
TABLET ORAL
Qty: 49 | Refills: 0 | Status: ERX | COMMUNITY
Start: 2023-01-04

## 2023-01-04 RX ADMIN — PREDNISONE: 10 TABLET ORAL at 00:00

## 2023-01-04 ASSESSMENT — LOCATION DETAILED DESCRIPTION DERM
LOCATION DETAILED: LEFT MEDIAL FRONTAL SCALP
LOCATION DETAILED: RIGHT VENTRAL PROXIMAL FOREARM
LOCATION DETAILED: LEFT VENTRAL PROXIMAL FOREARM
LOCATION DETAILED: RIGHT ULNAR DORSAL HAND
LOCATION DETAILED: LEFT RADIAL DORSAL HAND
LOCATION DETAILED: LEFT CAVUM CONCHA
LOCATION DETAILED: LEFT INFERIOR CRUS OF ANTIHELIX
LOCATION DETAILED: RIGHT CAVUM CONCHA
LOCATION DETAILED: LEFT AXILLARY VAULT
LOCATION DETAILED: RIGHT CYMBA CONCHA

## 2023-01-04 ASSESSMENT — LOCATION ZONE DERM
LOCATION ZONE: SCALP
LOCATION ZONE: EAR
LOCATION ZONE: ARM
LOCATION ZONE: AXILLAE
LOCATION ZONE: HAND

## 2023-01-04 ASSESSMENT — LOCATION SIMPLE DESCRIPTION DERM
LOCATION SIMPLE: LEFT SCALP
LOCATION SIMPLE: RIGHT HAND
LOCATION SIMPLE: LEFT FOREARM
LOCATION SIMPLE: RIGHT EAR
LOCATION SIMPLE: RIGHT FOREARM
LOCATION SIMPLE: LEFT AXILLARY VAULT
LOCATION SIMPLE: LEFT EAR
LOCATION SIMPLE: LEFT HAND

## 2023-01-04 ASSESSMENT — SEVERITY ASSESSMENT 2020: SEVERITY 2020: MODERATE

## 2023-01-04 NOTE — PROCEDURE: PRESCRIPTION MEDICATION MANAGEMENT
Render In Strict Bullet Format?: No
Detail Level: Zone
Initiate Treatment: prednisone 10 mg tablet \\nQuantity: 49.0 Tablet  Days Supply: 22\\nSig: Take 4 PO QAM X4 days, 3 PO QAM X4 days, 2 PO QAM X7 days, 1 PO QAM X7 days then stop
Continue Regimen: Dupixent 300mg SC every week\\nSystane eye drops QD \\nOlux 0.05% foam BID PRN flares\\nKetoconazole 2% shampoo BIW-TIW\\nKetoconazole 2% cream BID to ears\\nAllegra QAM (continue)\\nClaritin QHS (continue)\\nProtopic ointment 0.1% QD-BID to ears\\nMimyx BID to ears\\nVytone cream BID PRN flares to ears\\nOpzelura cream BID PRN (pt thinks refractory)
Continue Regimen: Protopic 0.1% ointment BID \\nMimyx cream QD as barrier\\nOlux 0.05% foam BID PRN flares\\nVytone cream BID PRN flares
Plan: await response to pred taper

## 2023-01-04 NOTE — PROCEDURE: CHRONOLOGY OF PRESENT ILLNESS
Chronology Of Present Illness: 8/8/2019:  Rash, located on the right ear and scalp. The rash is scaly, red, and itchy. The rash has been present for years. She has arthritis. She is not currently on any medications. She was started on Olux foam and ketoconazole cream.\\n11/17/2019: scalp is improved, ears are not. 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 She was started on hydrocortisone butuyrate and ketoconazole for the underarms. Biopsy obtained (eczematous dermatitis)\\n1/3/2020: Axillae have improved. scalp and ears as well. Continue current topicals and Pramosone lotion was added for the underarms.\\n2/5/2020: drastic improvement of axillae. She discontinued Hydrocortisone cream and is only using ketoconazole cream. scalp and ears are stable. Start Elidel cream to ears. \\n3/25/2020: scalp and ears not improved. Discussed Dupixent with patient. Consider after Cn19. Continue current topicals.\\n5/27/2020: scalp still flaring. Punch bx obtained of neck. (folliculitis, but not c/w clinical symptoms and presentation. Favor eczematous derm/AD. Continue current topicals. \\n8/4/2020: rash not well controlled on topicals. Start Dupixent enrollment. BSA 4, SEVERE. IGA score 4. Full biologic labs obtained. RX Valtrex given.\\n9/1/2020: DUPIXENT START TODAY. Recommend Systane eyedrops and Valtrex PRN as needed. \\n10/6/2020: Patient is somewhat improved, overall itching is better. Her ears are still very itchy. She is using Olux foam and ketoconazole cream to ears. She complains of new onset of joint pain above elbows and shoulders. She will see Dr. Dai and we will send letter.\\n11/3/2020: patient is improved overall. She still complains of itchy ears. Recommend talking to her audiologist to see if there are other hearing aids that are made of different material. RX Mimyx given for barrier cream. Continue Protopic 0.1% ointment QD PRN to ears and other topicals. Continue Dupixent injections. IGA score 1\\n12/9/2020: patient is much better. Ears are better. Hearing aid manufacturers told her that hearing aids are made out of medical grade hypoallergenic silicone. Itching has improved. Continue current topicals, antihistamines and Dupixent. Start Zonalon cream to ears for itching. Lab order given to patient today. Discussed that injection site reactions should subside.\\n3/17/21: eyes are very bothersome. Discussed possible component of rosacea. Rec warm rice sock compresses in addition to the Systane eye drops. For now continue Dupixent and topicals.\\n4/19/21: Eyes are still dry and bothersome. She states she will be seeing a dry eye specialist on Wednesday. She reports continued itching in the ears. Recommend not making too many changes to treatment regimen due to patient?s upcoming appt with dry eye specialist. Pramosone cream prescribed today for itching in ears, briefly discussed R/B/SE of gabapentin to consider starting at NOV if itching does not improve.\\n7/19/21: stable. Continue Dupixent Q 2 weeks and topicals. Recently diagnosed with gastroenteritis and possible sepsis. 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 Continue Dupixent and all topicals as needed.\\n1/19/22: patient is stable. Continue Dupixent and topicals PRN.\\n4/20/22: her itching (due to oak pollen) starts 3-4 days prior to next injection. Discussed Rinvoque with patient. Discussed it controls the itching better. Discussed risks, benefits and side effects with patient (increase in malignancies, cardio vascular events, blood clots, DVT - usually associated with prior underlying smoking history or other diseases). Discussed injecting Dupixent Q10 days vs Q14 days. Patient desires to continue Dupixent at shorter intervals. Consider adding on Tim inhibitor during environmental allergy season in the future.\\n5/18/22: Patient reports itching is minimally improved on modified Dupixent dosing (every 9-10 days). She reports worst areas are on the arms and inside ears. Re-discussed R/B/SE of Rinvoq in detail today. Patient reports worst 7/10 itch. Patient reports no personal hx of cancer or blood clots. She reports hx of migraines. Patient reports worsened eye dryness since starting Dupixent therapy. She is agreeable to start Rinvoq, insurance coverage check initiated today. Patient to obtain repeat full biologics labs, lab slip provided to patient to get drawn ASAP (MUST be fasting). She is due for her next Dupixent injection this Sunday. She has not been vaccinated for shingles, recommend getting shingles vaccine first ASAP, then inject Dupixent as scheduled on Sunday. F/u in 2 weeks. Sample saved for pt, plan to start therapy at NOV.\\n5/31/22: Patient received shingles vaccine right after LOV. Last Dupixent injection 5/22/22. Patient is agreeable to start Rinvoq today. Rinvoq PA denied, will submit appeal with SCORAD score (39). Symptoms are worsening, causing intense itchiness and sleeplessness. RINVOQ START TODAY. samples provided in office.\\n7/5/22: Patient reports itching was much better when taking Rinvoq. Decreased itch factor from 6-7, down to a 2-3. Patient reports light headedness, nausea, indigestion, twitching in her chest while taking Rinvoq. She stopped it a couple days ago due to symptoms. She is still recovering from the side effects. Recommend starting Opzelura at this point. Consider restarting Rinvoq every other day once symptoms have resolved. Discussed Vtama with patient as well if Opzelura is not improving her symptoms.\\n8/5/22: Pt reports itching has not improved since starting opzelura. Pt to restart dupixent with loading dose after todays OV, sample given to Pt (1 300mg SC injection, pt states she has 1 pen at home). Rx sent to [facility].\\n9/2/22: pt states doing a little better after restarting Dupixent. Minor dry eyes and using Vytone and Olux foam topically currently. Will give pt Vtama sample today. Discussed considering restarting Rinvoq in future at lower dose. Patient states it worked the best but she complained of N/V and mild palpitations.\\n11/10/22: unimproved. First week after injection is good, then she itches again. Using a variety of topicals. Recommend trying Dermeleve cream and Remedy or glove in a bottle. Samples given. She will try injecting every week (supported by samples) x 1 month.\\n1/4/23: worse again. 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 Discussed restarting  Rinvoq at lower dosing vs prednisone taper. She reports insomnia with it. Patient agreeable to prednisone taper. Discussed MTX with patient. Will consider at follow up. Ears very itchy.
Detail Level: Zone

## 2023-01-04 NOTE — PROCEDURE: DUPIXENT MONITORING
Detail Level: Zone
Length Of Therapy: 3 months
Comments: First dupixent start 9/1/20-5/22/22. Restart : 8/5/22
Add High Risk Medication Management Associated Diagnosis?: No

## 2023-01-24 ENCOUNTER — APPOINTMENT (RX ONLY)
Dept: URBAN - METROPOLITAN AREA CLINIC 125 | Facility: CLINIC | Age: 58
Setting detail: DERMATOLOGY
End: 2023-01-24

## 2023-01-24 DIAGNOSIS — L20.89 OTHER ATOPIC DERMATITIS: ICD-10-CM | Status: INADEQUATELY CONTROLLED

## 2023-01-24 DIAGNOSIS — Z79.899 OTHER LONG TERM (CURRENT) DRUG THERAPY: ICD-10-CM

## 2023-01-24 PROCEDURE — ? DUPIXENT MONITORING

## 2023-01-24 PROCEDURE — ? METHOTREXATE INITIATION

## 2023-01-24 PROCEDURE — ? PRESCRIPTION

## 2023-01-24 PROCEDURE — ? ADDITIONAL NOTES

## 2023-01-24 PROCEDURE — ? HIGH RISK MEDICATION MONITORING

## 2023-01-24 PROCEDURE — ? CHRONOLOGY OF PRESENT ILLNESS

## 2023-01-24 PROCEDURE — ? PRESCRIPTION MEDICATION MANAGEMENT

## 2023-01-24 PROCEDURE — ? ORDER TESTS

## 2023-01-24 PROCEDURE — 99214 OFFICE O/P EST MOD 30 MIN: CPT

## 2023-01-24 PROCEDURE — ? COUNSELING

## 2023-01-24 RX ORDER — METHOTREXATE SODIUM 2.5 MG/1
TABLET ORAL
Qty: 20 | Refills: 0 | Status: ERX | COMMUNITY
Start: 2023-01-24

## 2023-01-24 RX ORDER — FOLIC ACID 1 MG/1
TABLET ORAL
Qty: 90 | Refills: 3 | Status: ERX | COMMUNITY
Start: 2023-01-24

## 2023-01-24 RX ADMIN — FOLIC ACID: 1 TABLET ORAL at 00:00

## 2023-01-24 RX ADMIN — METHOTREXATE SODIUM: 2.5 TABLET ORAL at 00:00

## 2023-01-24 ASSESSMENT — LOCATION DETAILED DESCRIPTION DERM
LOCATION DETAILED: LEFT AXILLARY VAULT
LOCATION DETAILED: LEFT RADIAL DORSAL HAND
LOCATION DETAILED: RIGHT CYMBA CONCHA
LOCATION DETAILED: RIGHT VENTRAL PROXIMAL FOREARM
LOCATION DETAILED: LEFT MEDIAL FRONTAL SCALP
LOCATION DETAILED: LEFT INFERIOR CRUS OF ANTIHELIX
LOCATION DETAILED: RIGHT ULNAR DORSAL HAND
LOCATION DETAILED: LEFT VENTRAL PROXIMAL FOREARM

## 2023-01-24 ASSESSMENT — LOCATION ZONE DERM
LOCATION ZONE: ARM
LOCATION ZONE: SCALP
LOCATION ZONE: EAR
LOCATION ZONE: AXILLAE
LOCATION ZONE: HAND

## 2023-01-24 ASSESSMENT — LOCATION SIMPLE DESCRIPTION DERM
LOCATION SIMPLE: LEFT SCALP
LOCATION SIMPLE: LEFT AXILLARY VAULT
LOCATION SIMPLE: LEFT HAND
LOCATION SIMPLE: LEFT FOREARM
LOCATION SIMPLE: RIGHT HAND
LOCATION SIMPLE: LEFT EAR
LOCATION SIMPLE: RIGHT FOREARM
LOCATION SIMPLE: RIGHT EAR

## 2023-01-24 ASSESSMENT — SEVERITY ASSESSMENT 2020: SEVERITY 2020: MODERATE

## 2023-01-24 ASSESSMENT — BSA ECZEMA: % BODY COVERED IN ECZEMA: 12

## 2023-01-24 NOTE — PROCEDURE: ORDER TESTS
Performing Laboratory: 0
Expected Date Of Service: 01/24/2023
Billing Type: Third-Party Bill
Bill For Surgical Tray: no
Billing Type: Third-Party Bill

## 2023-01-24 NOTE — PROCEDURE: HIGH RISK MEDICATION MONITORING
Itraconazole Counseling:  I discussed with the patient the risks of itraconazole including but not limited to liver damage, nausea/vomiting, neuropathy, and severe allergy.  The patient understands that this medication is best absorbed when taken with acidic beverages such as non-diet cola or ginger ale.  The patient understands that monitoring is required including baseline LFTs and repeat LFTs at intervals.  The patient understands that they are to contact us or the primary physician if concerning signs are noted.
Azithromycin Counseling:  I discussed with the patient the risks of azithromycin including but not limited to GI upset, allergic reaction, drug rash, diarrhea, and yeast infections.
Ivermectin Pregnancy And Lactation Text: This medication is Pregnancy Category C and it isn't known if it is safe during pregnancy. It is also excreted in breast milk.
Infliximab Pregnancy And Lactation Text: This medication is Pregnancy Category B and is considered safe during pregnancy. It is unknown if this medication is excreted in breast milk.
Xolair Counseling:  Patient informed of potential adverse effects including but not limited to fever, muscle aches, rash and allergic reactions.  The patient verbalized understanding of the proper use and possible adverse effects of Xolair.  All of the patient's questions and concerns were addressed.
Bexarotene Counseling:  I discussed with the patient the risks of bexarotene including but not limited to hair loss, dry lips/skin/eyes, liver abnormalities, hyperlipidemia, pancreatitis, depression/suicidal ideation, photosensitivity, drug rash/allergic reactions, hypothyroidism, anemia, leukopenia, infection, cataracts, and teratogenicity.  Patient understands that they will need regular blood tests to check lipid profile, liver function tests, white blood cell count, thyroid function tests and pregnancy test if applicable.
Hydroxychloroquine Pregnancy And Lactation Text: This medication has been shown to cause fetal harm but it isn't assigned a Pregnancy Risk Category. There are small amounts excreted in breast milk.
Xolair Pregnancy And Lactation Text: This medication is Pregnancy Category B and is considered safe during pregnancy. This medication is excreted in breast milk.
Nsaids Counseling: NSAID Counseling: I discussed with the patient that NSAIDs should be taken with food. Prolonged use of NSAIDs can result in the development of stomach ulcers.  Patient advised to stop taking NSAIDs if abdominal pain occurs.  The patient verbalized understanding of the proper use and possible adverse effects of NSAIDs.  All of the patient's questions and concerns were addressed.
Valtrex Pregnancy And Lactation Text: this medication is Pregnancy Category B and is considered safe during pregnancy. This medication is not directly found in breast milk but it's metabolite acyclovir is present.
Eucrisa Pregnancy And Lactation Text: This medication has not been assigned a Pregnancy Risk Category but animal studies failed to show danger with the topical medication. It is unknown if the medication is excreted in breast milk.
Quinolones Pregnancy And Lactation Text: This medication is Pregnancy Category C and it isn't know if it is safe during pregnancy. It is also excreted in breast milk.
Rituxan Counseling:  I discussed with the patient the risks of Rituxan infusions. Side effects can include infusion reactions, severe drug rashes including mucocutaneous reactions, reactivation of latent hepatitis and other infections and rarely progressive multifocal leukoencephalopathy.  All of the patient's questions and concerns were addressed.
Topical Clindamycin Pregnancy And Lactation Text: This medication is Pregnancy Category B and is considered safe during pregnancy. It is unknown if it is excreted in breast milk.
Bexarotene Pregnancy And Lactation Text: This medication is Pregnancy Category X and should not be given to women who are pregnant or may become pregnant. This medication should not be used if you are breast feeding.
Nsaids Pregnancy And Lactation Text: These medications are considered safe up to 30 weeks gestation. It is excreted in breast milk.
Topical Sulfur Applications Counseling: Topical Sulfur Counseling: Patient counseled that this medication may cause skin irritation or allergic reactions.  In the event of skin irritation, the patient was advised to reduce the amount of the drug applied or use it less frequently.   The patient verbalized understanding of the proper use and possible adverse effects of topical sulfur application.  All of the patient's questions and concerns were addressed.
Rifampin Counseling: I discussed with the patient the risks of rifampin including but not limited to liver damage, kidney damage, red-orange body fluids, nausea/vomiting and severe allergy.
Azithromycin Pregnancy And Lactation Text: This medication is considered safe during pregnancy and is also secreted in breast milk.
Hydroquinone Counseling:  Patient advised that medication may result in skin irritation, lightening (hypopigmentation), dryness, and burning.  In the event of skin irritation, the patient was advised to reduce the amount of the drug applied or use it less frequently.  Rarely, spots that are treated with hydroquinone can become darker (pseudoochronosis).  Should this occur, patient instructed to stop medication and call the office. The patient verbalized understanding of the proper use and possible adverse effects of hydroquinone.  All of the patient's questions and concerns were addressed.
Bactrim Counseling:  I discussed with the patient the risks of sulfa antibiotics including but not limited to GI upset, allergic reaction, drug rash, diarrhea, dizziness, photosensitivity, and yeast infections.  Rarely, more serious reactions can occur including but not limited to aplastic anemia, agranulocytosis, methemoglobinemia, blood dyscrasias, liver or kidney failure, lung infiltrates or desquamative/blistering drug rashes.
Ketoconazole Counseling:   Patient counseled regarding improving absorption with orange juice.  Adverse effects include but are not limited to breast enlargement, headache, diarrhea, nausea, upset stomach, liver function test abnormalities, taste disturbance, and stomach pain.  There is a rare possibility of liver failure that can occur when taking ketoconazole. The patient understands that monitoring of LFTs may be required, especially at baseline. The patient verbalized understanding of the proper use and possible adverse effects of ketoconazole.  All of the patient's questions and concerns were addressed.
Isotretinoin Counseling: Patient should get monthly blood tests, not donate blood, not drive at night if vision affected, not share medication, and not undergo elective surgery for 6 months after tx completed. Side effects reviewed, pt to contact office should one occur.
Use Enhanced Medication Counseling?: No
Odomzo Counseling- I discussed with the patient the risks of Odomzo including but not limited to nausea, vomiting, diarrhea, constipation, weight loss, changes in the sense of taste, decreased appetite, muscle spasms, and hair loss.  The patient verbalized understanding of the proper use and possible adverse effects of Odomzo.  All of the patient's questions and concerns were addressed.
Topical Sulfur Applications Pregnancy And Lactation Text: This medication is Pregnancy Category C and has an unknown safety profile during pregnancy. It is unknown if this topical medication is excreted in breast milk.
Rituxan Pregnancy And Lactation Text: This medication is Pregnancy Category C and it isn't know if it is safe during pregnancy. It is unknown if this medication is excreted in breast milk but similar antibodies are known to be excreted.
Azathioprine Counseling:  I discussed with the patient the risks of azathioprine including but not limited to myelosuppression, immunosuppression, hepatotoxicity, lymphoma, and infections.  The patient understands that monitoring is required including baseline LFTs, Creatinine, possible TPMP genotyping and weekly CBCs for the first month and then every 2 weeks thereafter.  The patient verbalized understanding of the proper use and possible adverse effects of azathioprine.  All of the patient's questions and concerns were addressed.
Azathioprine Pregnancy And Lactation Text: This medication is Pregnancy Category D and isn't considered safe during pregnancy. It is unknown if this medication is excreted in breast milk.
Ketoconazole Pregnancy And Lactation Text: This medication is Pregnancy Category C and it isn't know if it is safe during pregnancy. It is also excreted in breast milk and breast feeding isn't recommended.
Siliq Counseling:  I discussed with the patient the risks of Siliq including but not limited to new or worsening depression, suicidal thoughts and behavior, immunosuppression, malignancy, posterior leukoencephalopathy syndrome, and serious infections.  The patient understands that monitoring is required including a PPD at baseline and must alert us or the primary physician if symptoms of infection or other concerning signs are noted. There is also a special program designed to monitor depression which is required with Siliq.
Bactrim Pregnancy And Lactation Text: This medication is Pregnancy Category D and is known to cause fetal risk.  It is also excreted in breast milk.
Isotretinoin Pregnancy And Lactation Text: This medication is Pregnancy Category X and is considered extremely dangerous during pregnancy. It is unknown if it is excreted in breast milk.
High Dose Vitamin A Counseling: Side effects reviewed, pt to contact office should one occur.
Odomzo Pregnancy And Lactation Text: This medication is Pregnancy Category X and is absolutely contraindicated during pregnancy. It is unknown if it is excreted in breast milk.
Zyclara Counseling:  I discussed with the patient the risks of imiquimod including but not limited to erythema, scaling, itching, weeping, crusting, and pain.  Patient understands that the inflammatory response to imiquimod is variable from person to person and was educated regarded proper titration schedule.  If flu-like symptoms develop, patient knows to discontinue the medication and contact us.
Imiquimod Counseling:  I discussed with the patient the risks of imiquimod including but not limited to erythema, scaling, itching, weeping, crusting, and pain.  Patient understands that the inflammatory response to imiquimod is variable from person to person and was educated regarded proper titration schedule.  If flu-like symptoms develop, patient knows to discontinue the medication and contact us.
Imiquimod Pregnancy And Lactation Text: This medication is Pregnancy Category C. It is unknown if this medication is excreted in breast milk.
Cephalexin Counseling: I counseled the patient regarding use of cephalexin as an antibiotic for prophylactic and/or therapeutic purposes. Cephalexin (commonly prescribed under brand name Keflex) is a cephalosporin antibiotic which is active against numerous classes of bacteria, including most skin bacteria. Side effects may include nausea, diarrhea, gastrointestinal upset, rash, hives, yeast infections, and in rare cases, hepatitis, kidney disease, seizures, fever, confusion, neurologic symptoms, and others. Patients with severe allergies to penicillin medications are cautioned that there is about a 10% incidence of cross-reactivity with cephalosporins. When possible, patients with penicillin allergies should use alternatives to cephalosporins for antibiotic therapy.
Siliq Pregnancy And Lactation Text: The risk during pregnancy and breastfeeding is uncertain with this medication.
Terbinafine Counseling: Patient counseling regarding adverse effects of terbinafine including but not limited to headache, diarrhea, rash, upset stomach, liver function test abnormalities, itching, taste/smell disturbance, nausea, abdominal pain, and flatulence.  There is a rare possibility of liver failure that can occur when taking terbinafine.  The patient understands that a baseline LFT and kidney function test may be required. The patient verbalized understanding of the proper use and possible adverse effects of terbinafine.  All of the patient's questions and concerns were addressed.
Cellcept Counseling:  I discussed with the patient the risks of mycophenolate mofetil including but not limited to infection/immunosuppression, GI upset, hypokalemia, hypercholesterolemia, bone marrow suppression, lymphoproliferative disorders, malignancy, GI ulceration/bleed/perforation, colitis, interstitial lung disease, kidney failure, progressive multifocal leukoencephalopathy, and birth defects.  The patient understands that monitoring is required including a baseline creatinine and regular CBC testing. In addition, patient must alert us immediately if symptoms of infection or other concerning signs are noted.
High Dose Vitamin A Pregnancy And Lactation Text: High dose vitamin A therapy is contraindicated during pregnancy and breast feeding.
Cimzia Counseling:  I discussed with the patient the risks of Cimzia including but not limited to immunosuppression, allergic reactions and infections.  The patient understands that monitoring is required including a PPD at baseline and must alert us or the primary physician if symptoms of infection or other concerning signs are noted.
Otezla Counseling: The side effects of Otezla were discussed with the patient, including but not limited to worsening or new depression, weight loss, diarrhea, nausea, upper respiratory tract infection, and headache. Patient instructed to call the office should any adverse effect occur.  The patient verbalized understanding of the proper use and possible adverse effects of Otezla.  All the patient's questions and concerns were addressed.
Terbinafine Pregnancy And Lactation Text: This medication is Pregnancy Category B and is considered safe during pregnancy. It is also excreted in breast milk and breast feeding isn't recommended.
Otezla Pregnancy And Lactation Text: This medication is Pregnancy Category C and it isn't known if it is safe during pregnancy. It is unknown if it is excreted in breast milk.
Minoxidil Counseling: Minoxidil is a topical medication which can increase blood flow where it is applied. It is uncertain how this medication increases hair growth. Side effects are uncommon and include stinging and allergic reactions.
Detail Level: Generalized
Simponi Counseling:  I discussed with the patient the risks of golimumab including but not limited to myelosuppression, immunosuppression, autoimmune hepatitis, demyelinating diseases, lymphoma, and serious infections.  The patient understands that monitoring is required including a PPD at baseline and must alert us or the primary physician if symptoms of infection or other concerning signs are noted.
Cephalexin Pregnancy And Lactation Text: This medication is Pregnancy Category B and considered safe during pregnancy.  It is also excreted in breast milk but can be used safely for shorter doses.
Cimzia Pregnancy And Lactation Text: This medication crosses the placenta but can be considered safe in certain situations. Cimzia may be excreted in breast milk.
Oxybutynin Counseling:  I discussed with the patient the risks of oxybutynin including but not limited to skin rash, drowsiness, dry mouth, difficulty urinating, and blurred vision.
Cyclophosphamide Counseling:  I discussed with the patient the risks of cyclophosphamide including but not limited to hair loss, hormonal abnormalities, decreased fertility, abdominal pain, diarrhea, nausea and vomiting, bone marrow suppression and infection. The patient understands that monitoring is required while taking this medication.
Clindamycin Counseling: I counseled the patient regarding use of clindamycin as an antibiotic for prophylactic and/or therapeutic purposes. Clindamycin is active against numerous classes of bacteria, including skin bacteria. Side effects may include nausea, diarrhea, gastrointestinal upset, rash, hives, yeast infections, and in rare cases, colitis.
Cimetidine Counseling:  I discussed with the patient the risks of Cimetidine including but not limited to gynecomastia, headache, diarrhea, nausea, drowsiness, arrhythmias, pancreatitis, skin rashes, psychosis, bone marrow suppression and kidney toxicity.
Benzoyl Peroxide Counseling: Patient counseled that medicine may cause skin irritation and bleach clothing.  In the event of skin irritation, the patient was advised to reduce the amount of the drug applied or use it less frequently.   The patient verbalized understanding of the proper use and possible adverse effects of benzoyl peroxide.  All of the patient's questions and concerns were addressed.
Picato Counseling:  I discussed with the patient the risks of Picato including but not limited to erythema, scaling, itching, weeping, crusting, and pain.
Rifampin Pregnancy And Lactation Text: This medication is Pregnancy Category C and it isn't know if it is safe during pregnancy. It is also excreted in breast milk and should not be used if you are breast feeding.
Clindamycin Pregnancy And Lactation Text: This medication can be used in pregnancy if certain situations. Clindamycin is also present in breast milk.
Arava Counseling:  Patient counseled regarding adverse effects of Arava including but not limited to nausea, vomiting, abnormalities in liver function tests. Patients may develop mouth sores, rash, diarrhea, and abnormalities in blood counts. The patient understands that monitoring is required including LFTs and blood counts.  There is a rare possibility of scarring of the liver and lung problems that can occur when taking methotrexate. Persistent nausea, loss of appetite, pale stools, dark urine, cough, and shortness of breath should be reported immediately. Patient advised to discontinue Arava treatment and consult with a physician prior to attempting conception. The patient will have to undergo a treatment to eliminate Arava from the body prior to conception.
Cosentyx Counseling:  I discussed with the patient the risks of Cosentyx including but not limited to worsening of Crohn's disease, immunosuppression, allergic reactions and infections.  The patient understands that monitoring is required including a PPD at baseline and must alert us or the primary physician if symptoms of infection or other concerning signs are noted.
Cyclophosphamide Pregnancy And Lactation Text: This medication is Pregnancy Category D and it isn't considered safe during pregnancy. This medication is excreted in breast milk.
Skyrizi Counseling: I discussed with the patient the risks of risankizumab-rzaa including but not limited to immunosuppression, and serious infections.  The patient understands that monitoring is required including a PPD at baseline and must alert us or the primary physician if symptoms of infection or other concerning signs are noted.
Cyclosporine Counseling:  I discussed with the patient the risks of cyclosporine including but not limited to hypertension, gingival hyperplasia,myelosuppression, immunosuppression, liver damage, kidney damage, neurotoxicity, lymphoma, and serious infections. The patient understands that monitoring is required including baseline blood pressure, CBC, CMP, lipid panel and uric acid, and then 1-2 times monthly CMP and blood pressure.
Dapsone Counseling: I discussed with the patient the risks of dapsone including but not limited to hemolytic anemia, agranulocytosis, rashes, methemoglobinemia, kidney failure, peripheral neuropathy, headaches, GI upset, and liver toxicity.  Patients who start dapsone require monitoring including baseline LFTs and weekly CBCs for the first month, then every month thereafter.  The patient verbalized understanding of the proper use and possible adverse effects of dapsone.  All of the patient's questions and concerns were addressed.
Sarecycline Counseling: Patient advised regarding possible photosensitivity and discoloration of the teeth, skin, lips, tongue and gums.  Patient instructed to avoid sunlight, if possible.  When exposed to sunlight, patients should wear protective clothing, sunglasses, and sunscreen.  The patient was instructed to call the office immediately if the following severe adverse effects occur:  hearing changes, easy bruising/bleeding, severe headache, or vision changes.  The patient verbalized understanding of the proper use and possible adverse effects of sarecycline.  All of the patient's questions and concerns were addressed.
Benzoyl Peroxide Pregnancy And Lactation Text: This medication is Pregnancy Category C. It is unknown if benzoyl peroxide is excreted in breast milk.
Carac Counseling:  I discussed with the patient the risks of Carac including but not limited to erythema, scaling, itching, weeping, crusting, and pain.
Birth Control Pills Counseling: Birth Control Pill Counseling: I discussed with the patient the potential side effects of OCPs including but not limited to increased risk of stroke, heart attack, thrombophlebitis, deep venous thrombosis, hepatic adenomas, breast changes, GI upset, headaches, and depression.  The patient verbalized understanding of the proper use and possible adverse effects of OCPs. All of the patient's questions and concerns were addressed.
Dupixent Counseling: I discussed with the patient the risks of dupilumab including but not limited to eye infection and irritation, cold sores, injection site reactions, worsening of asthma, allergic reactions and increased risk of parasitic infection.  Live vaccines should be avoided while taking dupilumab. Dupilumab will also interact with certain medications such as warfarin and cyclosporine. The patient understands that monitoring is required and they must alert us or the primary physician if symptoms of infection or other concerning signs are noted.
Doxycycline Counseling:  Patient counseled regarding possible photosensitivity and increased risk for sunburn.  Patient instructed to avoid sunlight, if possible.  When exposed to sunlight, patients should wear protective clothing, sunglasses, and sunscreen.  The patient was instructed to call the office immediately if the following severe adverse effects occur:  hearing changes, easy bruising/bleeding, severe headache, or vision changes.  The patient verbalized understanding of the proper use and possible adverse effects of doxycycline.  All of the patient's questions and concerns were addressed.
Clofazimine Counseling:  I discussed with the patient the risks of clofazimine including but not limited to skin and eye pigmentation, liver damage, nausea/vomiting, gastrointestinal bleeding and allergy.
Protopic Counseling: Patient may experience a mild burning sensation during topical application. Protopic is not approved in children less than 2 years of age. There have been case reports of hematologic and skin malignancies in patients using topical calcineurin inhibitors although causality is questionable.
Sarecycline Pregnancy And Lactation Text: This medication is Pregnancy Category D and not consider safe during pregnancy. It is also excreted in breast milk.
Doxycycline Pregnancy And Lactation Text: This medication is Pregnancy Category D and not consider safe during pregnancy. It is also excreted in breast milk but is considered safe for shorter treatment courses.
Stelara Counseling:  I discussed with the patient the risks of ustekinumab including but not limited to immunosuppression, malignancy, posterior leukoencephalopathy syndrome, and serious infections.  The patient understands that monitoring is required including a PPD at baseline and must alert us or the primary physician if symptoms of infection or other concerning signs are noted.
Dapsone Pregnancy And Lactation Text: This medication is Pregnancy Category C and is not considered safe during pregnancy or breast feeding.
Clofazimine Pregnancy And Lactation Text: This medication is Pregnancy Category C and isn't considered safe during pregnancy. It is excreted in breast milk.
Dupixent Pregnancy And Lactation Text: This medication likely crosses the placenta but the risk for the fetus is uncertain. This medication is excreted in breast milk.
Carac Pregnancy And Lactation Text: This medication is Pregnancy Category X and contraindicated in pregnancy and in women who may become pregnant. It is unknown if this medication is excreted in breast milk.
Cyclosporine Pregnancy And Lactation Text: This medication is Pregnancy Category C and it isn't know if it is safe during pregnancy. This medication is excreted in breast milk.
Doxepin Counseling:  Patient advised that the medication is sedating and not to drive a car after taking this medication. Patient informed of potential adverse effects including but not limited to dry mouth, urinary retention, and blurry vision.  The patient verbalized understanding of the proper use and possible adverse effects of doxepin.  All of the patient's questions and concerns were addressed.
Birth Control Pills Pregnancy And Lactation Text: This medication should be avoided if pregnant and for the first 30 days post-partum.
Tetracycline Counseling: Patient counseled regarding possible photosensitivity and increased risk for sunburn.  Patient instructed to avoid sunlight, if possible.  When exposed to sunlight, patients should wear protective clothing, sunglasses, and sunscreen.  The patient was instructed to call the office immediately if the following severe adverse effects occur:  hearing changes, easy bruising/bleeding, severe headache, or vision changes.  The patient verbalized understanding of the proper use and possible adverse effects of tetracycline.  All of the patient's questions and concerns were addressed. Patient understands to avoid pregnancy while on therapy due to potential birth defects.
Doxepin Pregnancy And Lactation Text: This medication is Pregnancy Category C and it isn't known if it is safe during pregnancy. It is also excreted in breast milk and breast feeding isn't recommended.
Spironolactone Counseling: Patient advised regarding risks of diarrhea, abdominal pain, hyperkalemia, birth defects (for female patients), liver toxicity and renal toxicity. The patient may need blood work to monitor liver and kidney function and potassium levels while on therapy. The patient verbalized understanding of the proper use and possible adverse effects of spironolactone.  All of the patient's questions and concerns were addressed.
Erivedge Counseling- I discussed with the patient the risks of Erivedge including but not limited to nausea, vomiting, diarrhea, constipation, weight loss, changes in the sense of taste, decreased appetite, muscle spasms, and hair loss.  The patient verbalized understanding of the proper use and possible adverse effects of Erivedge.  All of the patient's questions and concerns were addressed.
Protopic Pregnancy And Lactation Text: This medication is Pregnancy Category C. It is unknown if this medication is excreted in breast milk when applied topically.
Taltz Counseling: I discussed with the patient the risks of ixekizumab including but not limited to immunosuppression, serious infections, worsening of inflammatory bowel disease and drug reactions.  The patient understands that monitoring is required including a PPD at baseline and must alert us or the primary physician if symptoms of infection or other concerning signs are noted.
5-Fu Counseling: 5-Fluorouracil Counseling:  I discussed with the patient the risks of 5-fluorouracil including but not limited to erythema, scaling, itching, weeping, crusting, and pain.
Enbrel Counseling:  I discussed with the patient the risks of etanercept including but not limited to myelosuppression, immunosuppression, autoimmune hepatitis, demyelinating diseases, lymphoma, and infections.  The patient understands that monitoring is required including a PPD at baseline and must alert us or the primary physician if symptoms of infection or other concerning signs are noted.
Colchicine Counseling:  Patient counseled regarding adverse effects including but not limited to stomach upset (nausea, vomiting, stomach pain, or diarrhea).  Patient instructed to limit alcohol consumption while taking this medication.  Colchicine may reduce blood counts especially with prolonged use.  The patient understands that monitoring of kidney function and blood counts may be required, especially at baseline. The patient verbalized understanding of the proper use and possible adverse effects of colchicine.  All of the patient's questions and concerns were addressed.
Methotrexate Counseling:  Patient counseled regarding adverse effects of methotrexate including but not limited to nausea, vomiting, abnormalities in liver function tests. Patients may develop mouth sores, rash, diarrhea, and abnormalities in blood counts. The patient understands that monitoring is required including LFT's and blood counts.  There is a rare possibility of scarring of the liver and lung problems that can occur when taking methotrexate. Persistent nausea, loss of appetite, pale stools, dark urine, cough, and shortness of breath should be reported immediately. Patient advised to discontinue methotrexate treatment at least three months before attempting to become pregnant.  I discussed the need for folate supplements while taking methotrexate.  These supplements can decrease side effects during methotrexate treatment. The patient verbalized understanding of the proper use and possible adverse effects of methotrexate.  All of the patient's questions and concerns were addressed.
Erythromycin Counseling:  I discussed with the patient the risks of erythromycin including but not limited to GI upset, allergic reaction, drug rash, diarrhea, increase in liver enzymes, and yeast infections.
Spironolactone Pregnancy And Lactation Text: This medication can cause feminization of the male fetus and should be avoided during pregnancy. The active metabolite is also found in breast milk.
Erythromycin Pregnancy And Lactation Text: This medication is Pregnancy Category B and is considered safe during pregnancy. It is also excreted in breast milk.
Methotrexate Pregnancy And Lactation Text: This medication is Pregnancy Category X and is known to cause fetal harm. This medication is excreted in breast milk.
Hydroxyzine Counseling: Patient advised that the medication is sedating and not to drive a car after taking this medication.  Patient informed of potential adverse effects including but not limited to dry mouth, urinary retention, and blurry vision.  The patient verbalized understanding of the proper use and possible adverse effects of hydroxyzine.  All of the patient's questions and concerns were addressed.
Solaraze Counseling:  I discussed with the patient the risks of Solaraze including but not limited to erythema, scaling, itching, weeping, crusting, and pain.
Solaraze Pregnancy And Lactation Text: This medication is Pregnancy Category B and is considered safe. There is some data to suggest avoiding during the third trimester. It is unknown if this medication is excreted in breast milk.
Hydroxyzine Pregnancy And Lactation Text: This medication is not safe during pregnancy and should not be taken. It is also excreted in breast milk and breast feeding isn't recommended.
Metronidazole Counseling:  I discussed with the patient the risks of metronidazole including but not limited to seizures, nausea/vomiting, a metallic taste in the mouth, nausea/vomiting and severe allergy.
Humira Counseling:  I discussed with the patient the risks of adalimumab including but not limited to myelosuppression, immunosuppression, autoimmune hepatitis, demyelinating diseases, lymphoma, and serious infections.  The patient understands that monitoring is required including a PPD at baseline and must alert us or the primary physician if symptoms of infection or other concerning signs are noted.
Prednisone Counseling:  I discussed with the patient the risks of prolonged use of prednisone including but not limited to weight gain, insomnia, osteoporosis, mood changes, diabetes, susceptibility to infection, glaucoma and high blood pressure.  In cases where prednisone use is prolonged, patients should be monitored with blood pressure checks, serum glucose levels and an eye exam.  Additionally, the patient may need to be placed on GI prophylaxis, PCP prophylaxis, and calcium and vitamin D supplementation and/or a bisphosphonate.  The patient verbalized understanding of the proper use and the possible adverse effects of prednisone.  All of the patient's questions and concerns were addressed.
Gabapentin Counseling: I discussed with the patient the risks of gabapentin including but not limited to dizziness, somnolence, fatigue and ataxia.
Tremfya Counseling: I discussed with the patient the risks of guselkumab including but not limited to immunosuppression, serious infections, worsening of inflammatory bowel disease and drug reactions.  The patient understands that monitoring is required including a PPD at baseline and must alert us or the primary physician if symptoms of infection or other concerning signs are noted.
SSKI Counseling:  I discussed with the patient the risks of SSKI including but not limited to thyroid abnormalities, metallic taste, GI upset, fever, headache, acne, arthralgias, paraesthesias, lymphadenopathy, easy bleeding, arrhythmias, and allergic reaction.
Drysol Counseling:  I discussed with the patient the risks of drysol/aluminum chloride including but not limited to skin rash, itching, irritation, burning.
Drysol Pregnancy And Lactation Text: This medication is considered safe during pregnancy and breast feeding.
Sski Pregnancy And Lactation Text: This medication is Pregnancy Category D and isn't considered safe during pregnancy. It is excreted in breast milk.
Topical Retinoid counseling:  Patient advised to apply a pea-sized amount only at bedtime and wait 30 minutes after washing their face before applying.  If too drying, patient may add a non-comedogenic moisturizer. The patient verbalized understanding of the proper use and possible adverse effects of retinoids.  All of the patient's questions and concerns were addressed.
Fluconazole Counseling:  Patient counseled regarding adverse effects of fluconazole including but not limited to headache, diarrhea, nausea, upset stomach, liver function test abnormalities, taste disturbance, and stomach pain.  There is a rare possibility of liver failure that can occur when taking fluconazole.  The patient understands that monitoring of LFTs and kidney function test may be required, especially at baseline. The patient verbalized understanding of the proper use and possible adverse effects of fluconazole.  All of the patient's questions and concerns were addressed.
Metronidazole Pregnancy And Lactation Text: This medication is Pregnancy Category B and considered safe during pregnancy.  It is also excreted in breast milk.
Glycopyrrolate Counseling:  I discussed with the patient the risks of glycopyrrolate including but not limited to skin rash, drowsiness, dry mouth, difficulty urinating, and blurred vision.
Minocycline Counseling: Patient advised regarding possible photosensitivity and discoloration of the teeth, skin, lips, tongue and gums.  Patient instructed to avoid sunlight, if possible.  When exposed to sunlight, patients should wear protective clothing, sunglasses, and sunscreen.  The patient was instructed to call the office immediately if the following severe adverse effects occur:  hearing changes, easy bruising/bleeding, severe headache, or vision changes.  The patient verbalized understanding of the proper use and possible adverse effects of minocycline.  All of the patient's questions and concerns were addressed.
Elidel Counseling: Patient may experience a mild burning sensation during topical application. Elidel is not approved in children less than 2 years of age. There have been case reports of hematologic and skin malignancies in patients using topical calcineurin inhibitors although causality is questionable.
Albendazole Counseling:  I discussed with the patient the risks of albendazole including but not limited to cytopenia, kidney damage, nausea/vomiting and severe allergy.  The patient understands that this medication is being used in an off-label manner.
Thalidomide Counseling: I discussed with the patient the risks of thalidomide including but not limited to birth defects, anxiety, weakness, chest pain, dizziness, cough and severe allergy.
Tazorac Counseling:  Patient advised that medication is irritating and drying.  Patient may need to apply sparingly and wash off after an hour before eventually leaving it on overnight.  The patient verbalized understanding of the proper use and possible adverse effects of tazorac.  All of the patient's questions and concerns were addressed.
Glycopyrrolate Pregnancy And Lactation Text: This medication is Pregnancy Category B and is considered safe during pregnancy. It is unknown if it is excreted breast milk.
Ilumya Counseling: I discussed with the patient the risks of tildrakizumab including but not limited to immunosuppression, malignancy, posterior leukoencephalopathy syndrome, and serious infections.  The patient understands that monitoring is required including a PPD at baseline and must alert us or the primary physician if symptoms of infection or other concerning signs are noted.
Xeljanz Counseling: I discussed with the patient the risks of Xeljanz therapy including increased risk of infection, liver issues, headache, diarrhea, or cold symptoms. Live vaccines should be avoided. They were instructed to call if they have any problems.
Xelnickyz Pregnancy And Lactation Text: This medication is Pregnancy Category D and is not considered safe during pregnancy.  The risk during breast feeding is also uncertain.
Acitretin Counseling:  I discussed with the patient the risks of acitretin including but not limited to hair loss, dry lips/skin/eyes, liver damage, hyperlipidemia, depression/suicidal ideation, photosensitivity.  Serious rare side effects can include but are not limited to pancreatitis, pseudotumor cerebri, bony changes, clot formation/stroke/heart attack.  Patient understands that alcohol is contraindicated since it can result in liver toxicity and significantly prolong the elimination of the drug by many years.
Griseofulvin Counseling:  I discussed with the patient the risks of griseofulvin including but not limited to photosensitivity, cytopenia, liver damage, nausea/vomiting and severe allergy.  The patient understands that this medication is best absorbed when taken with a fatty meal (e.g., ice cream or french fries).
Hydroxychloroquine Counseling:  I discussed with the patient that a baseline ophthalmologic exam is needed at the start of therapy and every year thereafter while on therapy. A CBC may also be warranted for monitoring.  The side effects of this medication were discussed with the patient, including but not limited to agranulocytosis, aplastic anemia, seizures, rashes, retinopathy, and liver toxicity. Patient instructed to call the office should any adverse effect occur.  The patient verbalized understanding of the proper use and possible adverse effects of Plaquenil.  All the patient's questions and concerns were addressed.
Acitretin Pregnancy And Lactation Text: This medication is Pregnancy Category X and should not be given to women who are pregnant or may become pregnant in the future. This medication is excreted in breast milk.
Valtrex Counseling: I discussed with the patient the risks of valacyclovir including but not limited to kidney damage, nausea, vomiting and severe allergy.  The patient understands that if the infection seems to be worsening or is not improving, they are to call.
Ivermectin Counseling:  Patient instructed to take medication on an empty stomach with a full glass of water.  Patient informed of potential adverse effects including but not limited to nausea, diarrhea, dizziness, itching, and swelling of the extremities or lymph nodes.  The patient verbalized understanding of the proper use and possible adverse effects of ivermectin.  All of the patient's questions and concerns were addressed.
Tazorac Pregnancy And Lactation Text: This medication is not safe during pregnancy. It is unknown if this medication is excreted in breast milk.
Topical Clindamycin Counseling: Patient counseled that this medication may cause skin irritation or allergic reactions.  In the event of skin irritation, the patient was advised to reduce the amount of the drug applied or use it less frequently.   The patient verbalized understanding of the proper use and possible adverse effects of clindamycin.  All of the patient's questions and concerns were addressed.
Eucrisa Counseling: Patient may experience a mild burning sensation during topical application. Eucrisa is not approved in children less than 2 years of age.
Quinolones Counseling:  I discussed with the patient the risks of fluoroquinolones including but not limited to GI upset, allergic reaction, drug rash, diarrhea, dizziness, photosensitivity, yeast infections, liver function test abnormalities, tendonitis/tendon rupture.
Infliximab Counseling:  I discussed with the patient the risks of infliximab including but not limited to myelosuppression, immunosuppression, autoimmune hepatitis, demyelinating diseases, lymphoma, and serious infections.  The patient understands that monitoring is required including a PPD at baseline and must alert us or the primary physician if symptoms of infection or other concerning signs are noted.
Griseofulvin Pregnancy And Lactation Text: This medication is Pregnancy Category X and is known to cause serious birth defects. It is unknown if this medication is excreted in breast milk but breast feeding should be avoided.
Finasteride Pregnancy And Lactation Text: This medication is absolutely contraindicated during pregnancy. It is unknown if it is excreted in breast milk.
Rhofade Pregnancy And Lactation Text: This medication has not been assigned a Pregnancy Risk Category. It is unknown if the medication is excreted in breast milk.
Dutasteride Pregnancy And Lactation Text: This medication is absolutely contraindicated in women, especially during pregnancy and breast feeding. Feminization of male fetuses is possible if taking while pregnant.
Opioid Pregnancy And Lactation Text: These medications can lead to premature delivery and should be avoided during pregnancy. These medications are also present in breast milk in small amounts.
Calcipotriene Pregnancy And Lactation Text: This medication has not been proven safe during pregnancy. It is unknown if this medication is excreted in breast milk.
Tranexamic Acid Counseling:  Patient advised of the small risk of bleeding problems with tranexamic acid. They were also instructed to call if they developed any nausea, vomiting or diarrhea. All of the patient's questions and concerns were addressed.
Propranolol Counseling:  I discussed with the patient the risks of propranolol including but not limited to low heart rate, low blood pressure, low blood sugar, restlessness and increased cold sensitivity. They should call the office if they experience any of these side effects.
Calcipotriene Counseling:  I discussed with the patient the risks of calcipotriene including but not limited to erythema, scaling, itching, and irritation.
Winlevi Pregnancy And Lactation Text: This medication is considered safe during pregnancy and breastfeeding.
Azelaic Acid Counseling: Patient counseled that medicine may cause skin irritation and to avoid applying near the eyes.  In the event of skin irritation, the patient was advised to reduce the amount of the drug applied or use it less frequently.   The patient verbalized understanding of the proper use and possible adverse effects of azelaic acid.  All of the patient's questions and concerns were addressed.
Libtayo Pregnancy And Lactation Text: This medication is contraindicated in pregnancy and when breast feeding.
Propranolol Pregnancy And Lactation Text: This medication is Pregnancy Category C and it isn't known if it is safe during pregnancy. It is excreted in breast milk.
Opioid Counseling: I discussed with the patient the potential side effects of opioids including but not limited to addiction, altered mental status, and depression. I stressed avoiding alcohol, benzodiazepines, muscle relaxants and sleep aids unless specifically okayed by a physician. The patient verbalized understanding of the proper use and possible adverse effects of opioids. All of the patient's questions and concerns were addressed. They were instructed to flush the remaining pills down the toilet if they did not need them for pain.
Tranexamic Acid Pregnancy And Lactation Text: It is unknown if this medication is safe during pregnancy or breast feeding.
Topical Ketoconazole Counseling: Patient counseled that this medication may cause skin irritation or allergic reactions.  In the event of skin irritation, the patient was advised to reduce the amount of the drug applied or use it less frequently.   The patient verbalized understanding of the proper use and possible adverse effects of ketoconazole.  All of the patient's questions and concerns were addressed.
Wartpeel Counseling:  I discussed with the patient the risks of Wartpeel including but not limited to erythema, scaling, itching, weeping, crusting, and pain.
Oral Minoxidil Pregnancy And Lactation Text: This medication should only be used when clearly needed if you are pregnant, attempting to become pregnant or breast feeding.
Niacinamide Pregnancy And Lactation Text: These medications are considered safe during pregnancy.
Dutasteride Counseling: Dustasteride Counseling:  I discussed with the patient the risks of use of dutasteride including but not limited to decreased libido, decreased ejaculate volume, and gynecomastia. Women who can become pregnant should not handle medication.  All of the patient's questions and concerns were addressed.
Mirvaso Counseling: Mirvaso is a topical medication which can decrease superficial blood flow where applied. Side effects are uncommon and include stinging, redness and allergic reactions.
Rhofade Counseling: Rhofade is a topical medication which can decrease superficial blood flow where applied. Side effects are uncommon and include stinging, redness and allergic reactions.
Oral Minoxidil Counseling- I discussed with the patient the risks of oral minoxidil including but not limited to shortness of breath, swelling of the feet or ankles, dizziness, lightheadedness, unwanted hair growth and allergic reaction.  The patient verbalized understanding of the proper use and possible adverse effects of oral minoxidil.  All of the patient's questions and concerns were addressed.
Winlevi Counseling:  I discussed with the patient the risks of topical clascoterone including but not limited to erythema, scaling, itching, and stinging. Patient voiced their understanding.
Niacinamide Counseling: I recommended taking niacin or niacinamide, also know as vitamin B3, twice daily. Recent evidence suggests that taking vitamin B3 (500 mg twice daily) can reduce the risk of actinic keratoses and non-melanoma skin cancers. Side effects of vitamin B3 include flushing and headache.
Finasteride Counseling:  I discussed with the patient the risks of use of finasteride including but not limited to decreased libido, decreased ejaculate volume, gynecomastia, and depression. Women should not handle medication.  All of the patient's questions and concerns were addressed.
Libtayo Counseling- I discussed with the patient the risks of Libtayo including but not limited to nausea, vomiting, diarrhea, and bone or muscle pain.  The patient verbalized understanding of the proper use and possible adverse effects of Libtayo.  All of the patient's questions and concerns were addressed.
Rinvoq Counseling: I discussed with the patient the risks of Rinvoq therapy including but not limited to upper respiratory tract infections, shingles, cold sores, bronchitis, nausea, cough, fever, acne, and headache. Live vaccines should be avoided.  This medication has been linked to serious infections; higher rate of mortality; malignancy and lymphoproliferative disorders; major adverse cardiovascular events; thrombosis; thrombocytopenia, anemia, and neutropenia; lipid elevations; liver enzyme elevations; and gastrointestinal perforations.
Rinvoq Pregnancy And Lactation Text: Based on animal studies, Rinvoq may cause embryo-fetal harm when administered to pregnant women.  The medication should not be used in pregnancy.  Breastfeeding is not recommended during treatment and for 6 days after the last dose.
Sotyktu Counseling:  I discussed the most common side effects of Sotyktu including: common cold, sore throat, sinus infections, cold sores, canker sores, folliculitis, and acne.  I also discussed more serious side effects of Sotyktu including but not limited to: serious allergic reactions; increased risk for infections such as TB; cancers such as lymphomas; rhabdomyolysis and elevated CPK; and elevated triglycerides and liver enzymes. 
Sotyktu Pregnancy And Lactation Text: There is insufficient data to evaluate whether or not Sotyktu is safe to use during pregnancy.   It is not known if Sotyktu passes into breast milk and whether or not it is safe to use when breastfeeding.  
Olanzapine Counseling- I discussed with the patient the common side effects of olanzapine including but are not limited to: lack of energy, dry mouth, increased appetite, sleepiness, tremor, constipation, dizziness, changes in behavior, or restlessness.  Explained that teenagers are more likely to experience headaches, abdominal pain, pain in the arms or legs, tiredness, and sleepiness.  Serious side effects include but are not limited: increased risk of death in elderly patients who are confused, have memory loss, or dementia-related psychosis; hyperglycemia; increased cholesterol and triglycerides; and weight gain.
Olanzapine Pregnancy And Lactation Text: This medication is pregnancy category C.   There are no adequate and well controlled trials with olanzapine in pregnant females.  Olanzapine should be used during pregnancy only if the potential benefit justifies the potential risk to the fetus.   In a study in lactating healthy women, olanzapine was excreted in breast milk.  It is recommended that women taking olanzapine should not breast feed.
Cibinqo Counseling: I discussed with the patient the risks of Cibinqo therapy including but not limited to common cold, nausea, headache, cold sores, increased blood CPK levels, dizziness, UTIs, fatigue, acne, and vomitting. Live vaccines should be avoided.  This medication has been linked to serious infections; higher rate of mortality; malignancy and lymphoproliferative disorders; major adverse cardiovascular events; thrombosis; thrombocytopenia and lymphopenia; lipid elevations; and retinal detachment.
Opzelura Counseling:  I discussed with the patient the risks of Opzelura including but not limited to nasopharngitis, bronchitis, ear infection, eosinophila, hives, diarrhea, folliculitis, tonsillitis, and rhinorrhea.  Taken orally, this medication has been linked to serious infections; higher rate of mortality; malignancy and lymphoproliferative disorders; major adverse cardiovascular events; thrombosis; thrombocytopenia, anemia, and neutropenia; and lipid elevations.
Adbry Counseling: I discussed with the patient the risks of tralokinumab including but not limited to eye infection and irritation, cold sores, injection site reactions, worsening of asthma, allergic reactions and increased risk of parasitic infection.  Live vaccines should be avoided while taking tralokinumab. The patient understands that monitoring is required and they must alert us or the primary physician if symptoms of infection or other concerning signs are noted.
Cibinqo Pregnancy And Lactation Text: It is unknown if this medication will adversely affect pregnancy or breast feeding.  You should not take this medication if you are currently pregnant or planning a pregnancy or while breastfeeding.
Opzelura Pregnancy And Lactation Text: There is insufficient data to evaluate drug-associated risk for major birth defects, miscarriage, or other adverse maternal or fetal outcomes.  There is a pregnancy registry that monitors pregnancy outcomes in pregnant persons exposed to the medication during pregnancy.  It is unknown if this medication is excreted in breast milk.  Do not breastfeed during treatment and for about 4 weeks after the last dose.
Adbry Pregnancy And Lactation Text: It is unknown if this medication will adversely affect pregnancy or breast feeding.
Low Dose Naltrexone Counseling- I discussed with the patient the potential risks and side effects of low dose naltrexone including but not limited to: more vivid dreams, headaches, nausea, vomiting, abdominal pain, fatigue, dizziness, and anxiety.
Olumiant Counseling: I discussed with the patient the risks of Olumiant therapy including but not limited to upper respiratory tract infections, shingles, cold sores, and nausea. Live vaccines should be avoided.  This medication has been linked to serious infections; higher rate of mortality; malignancy and lymphoproliferative disorders; major adverse cardiovascular events; thrombosis; gastrointestinal perforations; neutropenia; lymphopenia; anemia; liver enzyme elevations; and lipid elevations.
Olumiant Pregnancy And Lactation Text: Based on animal studies, Olumiant may cause embryo-fetal harm when administered to pregnant women.  The medication should not be used in pregnancy.  Breastfeeding is not recommended during treatment.
Low Dose Naltrexone Pregnancy And Lactation Text: Naltrexone is pregnancy category C.  There have been no adequate and well-controlled studies in pregnant women.  It should be used in pregnancy only if the potential benefit justifies the potential risk to the fetus.   Limited data indicates that naltrexone is minimally excreted into breastmilk.

## 2023-01-24 NOTE — PROCEDURE: DUPIXENT MONITORING
Detail Level: Zone
Length Of Therapy: 3 months
Comments: First dupixent start 9/1/20-5/22/22. Restart : 8/5/22. \\nPt will stop Dupixent today 1/24/23 and begin MTX.
Add High Risk Medication Management Associated Diagnosis?: No

## 2023-01-24 NOTE — PROCEDURE: ADDITIONAL NOTES
Render Risk Assessment In Note?: no
Detail Level: Simple
Additional Notes: IGA score today: 3
Additional Notes: Pt will get CBC CMP 1/25/23 and then repeat CBC on 2/1/23.

## 2023-01-24 NOTE — PROCEDURE: PRESCRIPTION MEDICATION MANAGEMENT
Render In Strict Bullet Format?: No
Detail Level: Zone
Initiate Treatment: methotrexate sodium 2.5 mg tablet \\nQuantity: 20.0 Tablet\\nSig: Take 2 PO QD on Thursday (first week, then check cbc if wnl will continue), then take 3 tablets PO BID every Thursday until 1 month (recheck cbc, cmp in 1 month).\\n\\nfolic acid 1 mg tablet \\nQuantity: 90.0 Tablet\\nSig: Take 1 PO QD
Continue Regimen: Systane eye drops QD \\nOlux 0.05% foam BID PRN flares\\nKetoconazole 2% shampoo BIW-TIW\\nKetoconazole 2% cream BID to ears\\nAllegra QAM (continue)\\nClaritin QHS (continue)\\nProtopic ointment 0.1% QD-BID to ears\\nMimyx BID to ears\\nVytone cream BID PRN flares to ears\\nOpzelura cream BID PRN (pt thinks refractory)
Discontinue Regimen: Dupixent 300mg SC every week\\nprednisone 10 mg tablet \\nQuantity: 49.0 Tablet  Days Supply: 22\\nSig: Take 4 PO QAM X4 days, 3 PO QAM X4 days, 2 PO QAM X7 days, 1 PO QAM X7 days then stop
Plan: Pt will finish out prednisone taper and begin MTX 1/26. Will discontinue dupixent injections for now.

## 2023-01-24 NOTE — PROCEDURE: CHRONOLOGY OF PRESENT ILLNESS
Chronology Of Present Illness: 8/8/2019:  Rash, located on the right ear and scalp. The rash is scaly, red, and itchy. The rash has been present for years. She has arthritis. She is not currently on any medications. She was started on Olux foam and ketoconazole cream.\\n11/17/2019: scalp is improved, ears are not. 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 She was started on hydrocortisone butuyrate and ketoconazole for the underarms. Biopsy obtained (eczematous dermatitis)\\n1/3/2020: Axillae have improved. scalp and ears as well. Continue current topicals and Pramosone lotion was added for the underarms.\\n2/5/2020: drastic improvement of axillae. She discontinued Hydrocortisone cream and is only using ketoconazole cream. scalp and ears are stable. Start Elidel cream to ears. \\n3/25/2020: scalp and ears not improved. Discussed Dupixent with patient. Consider after Cn19. Continue current topicals.\\n5/27/2020: scalp still flaring. Punch bx obtained of neck. (folliculitis, but not c/w clinical symptoms and presentation. Favor eczematous derm/AD. Continue current topicals. \\n8/4/2020: rash not well controlled on topicals. Start Dupixent enrollment. BSA 4, SEVERE. IGA score 4. Full biologic labs obtained. RX Valtrex given.\\n9/1/2020: DUPIXENT START TODAY. Recommend Systane eyedrops and Valtrex PRN as needed. \\n10/6/2020: Patient is somewhat improved, overall itching is better. Her ears are still very itchy. She is using Olux foam and ketoconazole cream to ears. She complains of new onset of joint pain above elbows and shoulders. She will see Dr. Dai and we will send letter.\\n11/3/2020: patient is improved overall. She still complains of itchy ears. Recommend talking to her audiologist to see if there are other hearing aids that are made of different material. RX Mimyx given for barrier cream. Continue Protopic 0.1% ointment QD PRN to ears and other topicals. Continue Dupixent injections. IGA score 1\\n12/9/2020: patient is much better. Ears are better. Hearing aid manufacturers told her that hearing aids are made out of medical grade hypoallergenic silicone. Itching has improved. Continue current topicals, antihistamines and Dupixent. Start Zonalon cream to ears for itching. Lab order given to patient today. Discussed that injection site reactions should subside.\\n3/17/21: eyes are very bothersome. Discussed possible component of rosacea. Rec warm rice sock compresses in addition to the Systane eye drops. For now continue Dupixent and topicals.\\n4/19/21: Eyes are still dry and bothersome. She states she will be seeing a dry eye specialist on Wednesday. She reports continued itching in the ears. Recommend not making too many changes to treatment regimen due to patient?s upcoming appt with dry eye specialist. Pramosone cream prescribed today for itching in ears, briefly discussed R/B/SE of gabapentin to consider starting at NOV if itching does not improve.\\n7/19/21: stable. Continue Dupixent Q 2 weeks and topicals. Recently diagnosed with gastroenteritis and possible sepsis. 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 Continue Dupixent and all topicals as needed.\\n1/19/22: patient is stable. Continue Dupixent and topicals PRN.\\n4/20/22: her itching (due to oak pollen) starts 3-4 days prior to next injection. Discussed Rinvoque with patient. Discussed it controls the itching better. Discussed risks, benefits and side effects with patient (increase in malignancies, cardio vascular events, blood clots, DVT - usually associated with prior underlying smoking history or other diseases). Discussed injecting Dupixent Q10 days vs Q14 days. Patient desires to continue Dupixent at shorter intervals. Consider adding on Tim inhibitor during environmental allergy season in the future.\\n5/18/22: Patient reports itching is minimally improved on modified Dupixent dosing (every 9-10 days). She reports worst areas are on the arms and inside ears. Re-discussed R/B/SE of Rinvoq in detail today. Patient reports worst 7/10 itch. Patient reports no personal hx of cancer or blood clots. She reports hx of migraines. Patient reports worsened eye dryness since starting Dupixent therapy. She is agreeable to start Rinvoq, insurance coverage check initiated today. Patient to obtain repeat full biologics labs, lab slip provided to patient to get drawn ASAP (MUST be fasting). She is due for her next Dupixent injection this Sunday. She has not been vaccinated for shingles, recommend getting shingles vaccine first ASAP, then inject Dupixent as scheduled on Sunday. F/u in 2 weeks. Sample saved for pt, plan to start therapy at NOV.\\n5/31/22: Patient received shingles vaccine right after LOV. Last Dupixent injection 5/22/22. Patient is agreeable to start Rinvoq today. Rinvoq PA denied, will submit appeal with SCORAD score (39). Symptoms are worsening, causing intense itchiness and sleeplessness. RINVOQ START TODAY. samples provided in office.\\n7/5/22: Patient reports itching was much better when taking Rinvoq. Decreased itch factor from 6-7, down to a 2-3. Patient reports light headedness, nausea, indigestion, twitching in her chest while taking Rinvoq. She stopped it a couple days ago due to symptoms. She is still recovering from the side effects. Recommend starting Opzelura at this point. Consider restarting Rinvoq every other day once symptoms have resolved. Discussed Vtama with patient as well if Opzelura is not improving her symptoms.\\n8/5/22: Pt reports itching has not improved since starting opzelura. Pt to restart dupixent with loading dose after todays OV, sample given to Pt (1 300mg SC injection, pt states she has 1 pen at home). Rx sent to [email or web address].\\n9/2/22: pt states doing a little better after restarting Dupixent. Minor dry eyes and using Vytone and Olux foam topically currently. Will give pt Vtama sample today. Discussed considering restarting Rinvoq in future at lower dose. Patient states it worked the best but she complained of N/V and mild palpitations.\\n11/10/22: unimproved. First week after injection is good, then she itches again. Using a variety of topicals. Recommend trying Dermeleve cream and Remedy or glove in a bottle. Samples given. She will try injecting every week (supported by samples) x 1 month.\\n1/4/23: worse again. 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 Discussed restarting  Rinvoq at lower dosing vs prednisone taper. She reports insomnia with it. Patient agreeable to prednisone taper. Discussed MTX with patient. Will consider at follow up. Ears very itchy.\\n1/24/23: pt did not tolerate prednisone well, says eczema is unchanged. Itching on ears and shoulders today. Discussed MTX today and test dosage. Discussed regulatory blood work every month while on MTX. Pt would like to start MTX, will send Rx today. Pt to start 1/26. Will discontinue dupixent for now.
Detail Level: Zone

## 2023-02-22 ENCOUNTER — APPOINTMENT (RX ONLY)
Dept: URBAN - METROPOLITAN AREA CLINIC 125 | Facility: CLINIC | Age: 58
Setting detail: DERMATOLOGY
End: 2023-02-22

## 2023-02-22 DIAGNOSIS — Z79.899 OTHER LONG TERM (CURRENT) DRUG THERAPY: ICD-10-CM

## 2023-02-22 DIAGNOSIS — L20.89 OTHER ATOPIC DERMATITIS: ICD-10-CM

## 2023-02-22 DIAGNOSIS — D22 MELANOCYTIC NEVI: ICD-10-CM

## 2023-02-22 PROBLEM — D22.5 MELANOCYTIC NEVI OF TRUNK: Status: ACTIVE | Noted: 2023-02-22

## 2023-02-22 PROCEDURE — ? TREATMENT REGIMEN

## 2023-02-22 PROCEDURE — ? PRESCRIPTION MEDICATION MANAGEMENT

## 2023-02-22 PROCEDURE — 99214 OFFICE O/P EST MOD 30 MIN: CPT

## 2023-02-22 PROCEDURE — ? PRESCRIPTION

## 2023-02-22 PROCEDURE — ? COUNSELING

## 2023-02-22 PROCEDURE — ? METHOTREXATE MONITORING

## 2023-02-22 PROCEDURE — ? ORDER TESTS

## 2023-02-22 PROCEDURE — ? OBSERVATION AND MEASURE

## 2023-02-22 PROCEDURE — ? ADDITIONAL NOTES

## 2023-02-22 PROCEDURE — ? CHRONOLOGY OF PRESENT ILLNESS

## 2023-02-22 PROCEDURE — ? HIGH RISK MEDICATION MONITORING

## 2023-02-22 RX ORDER — METHOTREXATE SODIUM 2.5 MG/1
TABLET ORAL
Qty: 24 | Refills: 0 | Status: ERX

## 2023-02-22 ASSESSMENT — LOCATION DETAILED DESCRIPTION DERM
LOCATION DETAILED: LEFT AXILLARY VAULT
LOCATION DETAILED: LEFT VENTRAL PROXIMAL FOREARM
LOCATION DETAILED: LEFT MEDIAL FRONTAL SCALP
LOCATION DETAILED: RIGHT CYMBA CONCHA
LOCATION DETAILED: LEFT INFERIOR CRUS OF ANTIHELIX
LOCATION DETAILED: RIGHT ULNAR DORSAL HAND
LOCATION DETAILED: SUPERIOR THORACIC SPINE
LOCATION DETAILED: RIGHT VENTRAL PROXIMAL FOREARM
LOCATION DETAILED: LEFT RADIAL DORSAL HAND

## 2023-02-22 ASSESSMENT — LOCATION SIMPLE DESCRIPTION DERM
LOCATION SIMPLE: LEFT EAR
LOCATION SIMPLE: LEFT AXILLARY VAULT
LOCATION SIMPLE: LEFT SCALP
LOCATION SIMPLE: RIGHT FOREARM
LOCATION SIMPLE: LEFT HAND
LOCATION SIMPLE: RIGHT HAND
LOCATION SIMPLE: RIGHT EAR
LOCATION SIMPLE: UPPER BACK
LOCATION SIMPLE: LEFT FOREARM

## 2023-02-22 ASSESSMENT — LOCATION ZONE DERM
LOCATION ZONE: ARM
LOCATION ZONE: EAR
LOCATION ZONE: HAND
LOCATION ZONE: AXILLAE
LOCATION ZONE: SCALP
LOCATION ZONE: TRUNK

## 2023-02-22 NOTE — PROCEDURE: ORDER TESTS
Performing Laboratory: 0
Expected Date Of Service: 02/22/2023
Billing Type: Third-Party Bill
Bill For Surgical Tray: no

## 2023-02-22 NOTE — PROCEDURE: CHRONOLOGY OF PRESENT ILLNESS
Chronology Of Present Illness: 8/8/2019:  Rash, located on the right ear and scalp. The rash is scaly, red, and itchy. The rash has been present for years. She has arthritis. She is not currently on any medications. She was started on Olux foam and ketoconazole cream.\\n11/17/2019: scalp is improved, ears are not. 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 She was started on hydrocortisone butuyrate and ketoconazole for the underarms. Biopsy obtained (eczematous dermatitis)\\n1/3/2020: Axillae have improved. scalp and ears as well. Continue current topicals and Pramosone lotion was added for the underarms.\\n2/5/2020: drastic improvement of axillae. She discontinued Hydrocortisone cream and is only using ketoconazole cream. scalp and ears are stable. Start Elidel cream to ears. \\n3/25/2020: scalp and ears not improved. Discussed Dupixent with patient. Consider after Cn19. Continue current topicals.\\n5/27/2020: scalp still flaring. Punch bx obtained of neck. (folliculitis, but not c/w clinical symptoms and presentation. Favor eczematous derm/AD. Continue current topicals. \\n8/4/2020: rash not well controlled on topicals. Start Dupixent enrollment. BSA 4, SEVERE. IGA score 4. Full biologic labs obtained. RX Valtrex given.\\n9/1/2020: DUPIXENT START TODAY. Recommend Systane eyedrops and Valtrex PRN as needed. \\n10/6/2020: Patient is somewhat improved, overall itching is better. Her ears are still very itchy. She is using Olux foam and ketoconazole cream to ears. She complains of new onset of joint pain above elbows and shoulders. She will see Dr. Dai and we will send letter.\\n11/3/2020: patient is improved overall. She still complains of itchy ears. Recommend talking to her audiologist to see if there are other hearing aids that are made of different material. RX Mimyx given for barrier cream. Continue Protopic 0.1% ointment QD PRN to ears and other topicals. Continue Dupixent injections. IGA score 1\\n12/9/2020: patient is much better. Ears are better. Hearing aid manufacturers told her that hearing aids are made out of medical grade hypoallergenic silicone. Itching has improved. Continue current topicals, antihistamines and Dupixent. Start Zonalon cream to ears for itching. Lab order given to patient today. Discussed that injection site reactions should subside.\\n3/17/21: eyes are very bothersome. Discussed possible component of rosacea. Rec warm rice sock compresses in addition to the Systane eye drops. For now continue Dupixent and topicals.\\n4/19/21: Eyes are still dry and bothersome. She states she will be seeing a dry eye specialist on Wednesday. She reports continued itching in the ears. Recommend not making too many changes to treatment regimen due to patient?s upcoming appt with dry eye specialist. Pramosone cream prescribed today for itching in ears, briefly discussed R/B/SE of gabapentin to consider starting at NOV if itching does not improve.\\n7/19/21: stable. Continue Dupixent Q 2 weeks and topicals. Recently diagnosed with gastroenteritis and possible sepsis. 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 Continue Dupixent and all topicals as needed.\\n1/19/22: patient is stable. Continue Dupixent and topicals PRN.\\n4/20/22: her itching (due to oak pollen) starts 3-4 days prior to next injection. Discussed Rinvoque with patient. Discussed it controls the itching better. Discussed risks, benefits and side effects with patient (increase in malignancies, cardio vascular events, blood clots, DVT - usually associated with prior underlying smoking history or other diseases). Discussed injecting Dupixent Q10 days vs Q14 days. Patient desires to continue Dupixent at shorter intervals. Consider adding on Tim inhibitor during environmental allergy season in the future.\\n5/18/22: Patient reports itching is minimally improved on modified Dupixent dosing (every 9-10 days). She reports worst areas are on the arms and inside ears. Re-discussed R/B/SE of Rinvoq in detail today. Patient reports worst 7/10 itch. Patient reports no personal hx of cancer or blood clots. She reports hx of migraines. Patient reports worsened eye dryness since starting Dupixent therapy. She is agreeable to start Rinvoq, insurance coverage check initiated today. Patient to obtain repeat full biologics labs, lab slip provided to patient to get drawn ASAP (MUST be fasting). She is due for her next Dupixent injection this Sunday. She has not been vaccinated for shingles, recommend getting shingles vaccine first ASAP, then inject Dupixent as scheduled on Sunday. F/u in 2 weeks. Sample saved for pt, plan to start therapy at NOV.\\n5/31/22: Patient received shingles vaccine right after LOV. Last Dupixent injection 5/22/22. Patient is agreeable to start Rinvoq today. Rinvoq PA denied, will submit appeal with SCORAD score (39). Symptoms are worsening, causing intense itchiness and sleeplessness. RINVOQ START TODAY. samples provided in office.\\n7/5/22: Patient reports itching was much better when taking Rinvoq. Decreased itch factor from 6-7, down to a 2-3. Patient reports light headedness, nausea, indigestion, twitching in her chest while taking Rinvoq. She stopped it a couple days ago due to symptoms. She is still recovering from the side effects. Recommend starting Opzelura at this point. Consider restarting Rinvoq every other day once symptoms have resolved. Discussed Vtama with patient as well if Opzelura is not improving her symptoms.\\n8/5/22: Pt reports itching has not improved since starting opzelura. Pt to restart dupixent with loading dose after todays OV, sample given to Pt (1 300mg SC injection, pt states she has 1 pen at home). Rx sent to [facility].\\n9/2/22: pt states doing a little better after restarting Dupixent. Minor dry eyes and using Vytone and Olux foam topically currently. Will give pt Vtama sample today. Discussed considering restarting Rinvoq in future at lower dose. Patient states it worked the best but she complained of N/V and mild palpitations.\\n11/10/22: unimproved. First week after injection is good, then she itches again. Using a variety of topicals. Recommend trying Dermeleve cream and Remedy or glove in a bottle. Samples given. She will try injecting every week (supported by samples) x 1 month.\\n1/4/23: worse again. 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 Discussed restarting  Rinvoq at lower dosing vs prednisone taper. She reports insomnia with it. Patient agreeable to prednisone taper. Discussed MTX with patient. Will consider at follow up. Ears very itchy.\\n1/24/23: pt did not tolerate prednisone well, says eczema is unchanged. Itching on ears and shoulders today. Discussed MTX today and test dosage. Discussed regulatory blood work every month while on MTX. Pt would like to start MTX, will send Rx today. Pt to start 1/26. Will discontinue dupixent for now.\\n2/22/23: pt fatigued and foggy on MTX. worse on dose day, better throughout the week. Ears look normal on exam today. Consider inc dosage at f/u if not further improved
Detail Level: Zone

## 2023-02-22 NOTE — PROCEDURE: METHOTREXATE MONITORING
Add Additional Dosage: No
Dosage 4 (Mg/Week): 0
Detail Level: Zone
Current Dosage (Optional): 15mg/week
Calculate Cumulative Dosage Automatically?: No - Use Free Text
Document Previous Cumulative Dosage (Historical Patients Only): No - New Methotrexate Patient
Length Of Therapy (Optional): 1 month
Most Recent Cbc (Optional): 2/17/23

## 2023-02-22 NOTE — PROCEDURE: ADDITIONAL NOTES
Render Risk Assessment In Note?: no
Detail Level: Simple
Additional Notes: IGA score today: 3
Additional Notes: Will check labs QMonth for 3 months, then recheck every 3 months. Lab order given today.

## 2023-02-22 NOTE — PROCEDURE: PRESCRIPTION MEDICATION MANAGEMENT
Render In Strict Bullet Format?: No
Detail Level: Zone
Continue Regimen: methotrexate sodium 2.5 mg tablet \\nQuantity: 20.0 Tablet\\nSig: Take 3 tablets PO BID every Thursday until 1 month (recheck cbc, cmp in 1 month).\\nfolic acid 1 mg tablet \\nQuantity: 90.0 Tablet\\nSig: Take 1 PO QD\\nSystane eye drops QD \\nOlux 0.05% foam BID PRN flares\\nKetoconazole 2% shampoo BIW-TIW\\nKetoconazole 2% cream BID to ears\\nAllegra QAM (continue)\\nClaritin QHS (continue)\\nProtopic ointment 0.1% QD-BID to ears\\nMimyx BID to ears\\nVytone cream BID PRN flares to ears\\nOpzelura cream BID PRN (pt thinks refractory)
Discontinue Regimen: Dupixent 300mg SC every week\\nprednisone 10 mg tablet \\nQuantity: 49.0 Tablet  Days Supply: 22\\nSig: Take 4 PO QAM X4 days, 3 PO QAM X4 days, 2 PO QAM X7 days, 1 PO QAM X7 days then stop
Plan: Will resend Rx once 2/17 labs are rec’d from Dr. Muro

## 2023-03-21 ENCOUNTER — APPOINTMENT (RX ONLY)
Dept: URBAN - METROPOLITAN AREA CLINIC 125 | Facility: CLINIC | Age: 58
Setting detail: DERMATOLOGY
End: 2023-03-21

## 2023-03-21 DIAGNOSIS — Z79.899 OTHER LONG TERM (CURRENT) DRUG THERAPY: ICD-10-CM

## 2023-03-21 DIAGNOSIS — L72.0 EPIDERMAL CYST: ICD-10-CM

## 2023-03-21 DIAGNOSIS — L20.89 OTHER ATOPIC DERMATITIS: ICD-10-CM | Status: INADEQUATELY CONTROLLED

## 2023-03-21 PROCEDURE — 99214 OFFICE O/P EST MOD 30 MIN: CPT

## 2023-03-21 PROCEDURE — ? ADDITIONAL NOTES

## 2023-03-21 PROCEDURE — ? COUNSELING

## 2023-03-21 PROCEDURE — ? METHOTREXATE MONITORING

## 2023-03-21 PROCEDURE — ? OBSERVATION AND MEASURE

## 2023-03-21 PROCEDURE — ? PRESCRIPTION

## 2023-03-21 PROCEDURE — ? HIGH RISK MEDICATION MONITORING

## 2023-03-21 PROCEDURE — ? ORDER TESTS

## 2023-03-21 PROCEDURE — ? PRESCRIPTION MEDICATION MANAGEMENT

## 2023-03-21 PROCEDURE — ? CHRONOLOGY OF PRESENT ILLNESS

## 2023-03-21 RX ORDER — METHOTREXATE SODIUM 2.5 MG/1
TABLET ORAL
Qty: 24 | Refills: 0 | Status: ERX

## 2023-03-21 RX ORDER — MUPIROCIN 20 MG/G
OINTMENT TOPICAL
Qty: 22 | Refills: 2 | Status: ERX | COMMUNITY
Start: 2023-03-21

## 2023-03-21 RX ADMIN — MUPIROCIN: 20 OINTMENT TOPICAL at 00:00

## 2023-03-21 ASSESSMENT — LOCATION ZONE DERM
LOCATION ZONE: HAND
LOCATION ZONE: SCALP
LOCATION ZONE: NOSE
LOCATION ZONE: EAR
LOCATION ZONE: AXILLAE
LOCATION ZONE: ARM

## 2023-03-21 ASSESSMENT — LOCATION DETAILED DESCRIPTION DERM
LOCATION DETAILED: NASAL DORSUM
LOCATION DETAILED: LEFT INFERIOR CRUS OF ANTIHELIX
LOCATION DETAILED: RIGHT VENTRAL PROXIMAL FOREARM
LOCATION DETAILED: LEFT AXILLARY VAULT
LOCATION DETAILED: LEFT VENTRAL PROXIMAL FOREARM
LOCATION DETAILED: LEFT MEDIAL FRONTAL SCALP
LOCATION DETAILED: RIGHT ULNAR DORSAL HAND
LOCATION DETAILED: LEFT RADIAL DORSAL HAND
LOCATION DETAILED: RIGHT CYMBA CONCHA

## 2023-03-21 ASSESSMENT — LOCATION SIMPLE DESCRIPTION DERM
LOCATION SIMPLE: RIGHT EAR
LOCATION SIMPLE: LEFT EAR
LOCATION SIMPLE: LEFT SCALP
LOCATION SIMPLE: NOSE
LOCATION SIMPLE: RIGHT FOREARM
LOCATION SIMPLE: LEFT FOREARM
LOCATION SIMPLE: LEFT HAND
LOCATION SIMPLE: RIGHT HAND
LOCATION SIMPLE: LEFT AXILLARY VAULT

## 2023-03-21 NOTE — PROCEDURE: CHRONOLOGY OF PRESENT ILLNESS
Chronology Of Present Illness: 8/8/2019:  Rash, located on the right ear and scalp. The rash is scaly, red, and itchy. The rash has been present for years. She has arthritis. She is not currently on any medications. She was started on Olux foam and ketoconazole cream.\\n11/17/2019: scalp is improved, ears are not. 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 She was started on hydrocortisone butuyrate and ketoconazole for the underarms. Biopsy obtained (eczematous dermatitis)\\n1/3/2020: Axillae have improved. scalp and ears as well. Continue current topicals and Pramosone lotion was added for the underarms.\\n2/5/2020: drastic improvement of axillae. She discontinued Hydrocortisone cream and is only using ketoconazole cream. scalp and ears are stable. Start Elidel cream to ears. \\n3/25/2020: scalp and ears not improved. Discussed Dupixent with patient. Consider after Cn19. Continue current topicals.\\n5/27/2020: scalp still flaring. Punch bx obtained of neck. (folliculitis, but not c/w clinical symptoms and presentation. Favor eczematous derm/AD. Continue current topicals. \\n8/4/2020: rash not well controlled on topicals. Start Dupixent enrollment. BSA 4, SEVERE. IGA score 4. Full biologic labs obtained. RX Valtrex given.\\n9/1/2020: DUPIXENT START TODAY. Recommend Systane eyedrops and Valtrex PRN as needed. \\n10/6/2020: Patient is somewhat improved, overall itching is better. Her ears are still very itchy. She is using Olux foam and ketoconazole cream to ears. She complains of new onset of joint pain above elbows and shoulders. She will see Dr. Dai and we will send letter.\\n11/3/2020: patient is improved overall. She still complains of itchy ears. Recommend talking to her audiologist to see if there are other hearing aids that are made of different material. RX Mimyx given for barrier cream. Continue Protopic 0.1% ointment QD PRN to ears and other topicals. Continue Dupixent injections. IGA score 1\\n12/9/2020: patient is much better. Ears are better. Hearing aid manufacturers told her that hearing aids are made out of medical grade hypoallergenic silicone. Itching has improved. Continue current topicals, antihistamines and Dupixent. Start Zonalon cream to ears for itching. Lab order given to patient today. Discussed that injection site reactions should subside.\\n3/17/21: eyes are very bothersome. Discussed possible component of rosacea. Rec warm rice sock compresses in addition to the Systane eye drops. For now continue Dupixent and topicals.\\n4/19/21: Eyes are still dry and bothersome. She states she will be seeing a dry eye specialist on Wednesday. She reports continued itching in the ears. Recommend not making too many changes to treatment regimen due to patient?s upcoming appt with dry eye specialist. Pramosone cream prescribed today for itching in ears, briefly discussed R/B/SE of gabapentin to consider starting at NOV if itching does not improve.\\n7/19/21: stable. Continue Dupixent Q 2 weeks and topicals. Recently diagnosed with gastroenteritis and possible sepsis. 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 Continue Dupixent and all topicals as needed.\\n1/19/22: patient is stable. Continue Dupixent and topicals PRN.\\n4/20/22: her itching (due to oak pollen) starts 3-4 days prior to next injection. Discussed Rinvoque with patient. Discussed it controls the itching better. Discussed risks, benefits and side effects with patient (increase in malignancies, cardio vascular events, blood clots, DVT - usually associated with prior underlying smoking history or other diseases). Discussed injecting Dupixent Q10 days vs Q14 days. Patient desires to continue Dupixent at shorter intervals. Consider adding on Tim inhibitor during environmental allergy season in the future.\\n5/18/22: Patient reports itching is minimally improved on modified Dupixent dosing (every 9-10 days). She reports worst areas are on the arms and inside ears. Re-discussed R/B/SE of Rinvoq in detail today. Patient reports worst 7/10 itch. Patient reports no personal hx of cancer or blood clots. She reports hx of migraines. Patient reports worsened eye dryness since starting Dupixent therapy. She is agreeable to start Rinvoq, insurance coverage check initiated today. Patient to obtain repeat full biologics labs, lab slip provided to patient to get drawn ASAP (MUST be fasting). She is due for her next Dupixent injection this Sunday. She has not been vaccinated for shingles, recommend getting shingles vaccine first ASAP, then inject Dupixent as scheduled on Sunday. F/u in 2 weeks. Sample saved for pt, plan to start therapy at NOV.\\n5/31/22: Patient received shingles vaccine right after LOV. Last Dupixent injection 5/22/22. Patient is agreeable to start Rinvoq today. Rinvoq PA denied, will submit appeal with SCORAD score (39). Symptoms are worsening, causing intense itchiness and sleeplessness. RINVOQ START TODAY. samples provided in office.\\n7/5/22: Patient reports itching was much better when taking Rinvoq. Decreased itch factor from 6-7, down to a 2-3. Patient reports light headedness, nausea, indigestion, twitching in her chest while taking Rinvoq. She stopped it a couple days ago due to symptoms. She is still recovering from the side effects. Recommend starting Opzelura at this point. Consider restarting Rinvoq every other day once symptoms have resolved. Discussed Vtama with patient as well if Opzelura is not improving her symptoms.\\n8/5/22: Pt reports itching has not improved since starting opzelura. Pt to restart dupixent with loading dose after todays OV, sample given to Pt (1 300mg SC injection, pt states she has 1 pen at home). Rx sent to [facility].\\n9/2/22: pt states doing a little better after restarting Dupixent. Minor dry eyes and using Vytone and Olux foam topically currently. Will give pt Vtama sample today. Discussed considering restarting Rinvoq in future at lower dose. Patient states it worked the best but she complained of N/V and mild palpitations.\\n11/10/22: unimproved. First week after injection is good, then she itches again. Using a variety of topicals. Recommend trying Dermeleve cream and Remedy or glove in a bottle. Samples given. She will try injecting every week (supported by samples) x 1 month.\\n1/4/23: worse again. 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 Discussed restarting  Rinvoq at lower dosing vs prednisone taper. She reports insomnia with it. Patient agreeable to prednisone taper. Discussed MTX with patient. Will consider at follow up. Ears very itchy.\\n1/24/23: pt did not tolerate prednisone well, says eczema is unchanged. Itching on ears and shoulders today. Discussed MTX today and test dosage. Discussed regulatory blood work every month while on MTX. Pt would like to start MTX, will send Rx today. Pt to start 1/26. Will discontinue dupixent for now.\\n2/22/23: pt fatigued and foggy on MTX. worse on dose day, better throughout the week. Ears look normal on exam today. Consider inc dosage at f/u if not further improved\\n3/21/23: pt reports less foggy and fatigue on current regimen. Same dose for another month, plan to increase NOV pending labs WNL.
Detail Level: Zone

## 2023-03-21 NOTE — PROCEDURE: PRESCRIPTION MEDICATION MANAGEMENT
Render In Strict Bullet Format?: No
Detail Level: Zone
Continue Regimen: methotrexate sodium 2.5 mg tablet \\nQuantity: 20.0 Tablet\\nSig: Take 3 tablets PO BID every Thursday until 1 month (recheck cbc, cmp in 1 month).\\nfolic acid 1 mg tablet \\nQuantity: 90.0 Tablet\\nSig: Take 1 PO QD\\nSystane eye drops QD \\nOlux 0.05% foam BID PRN flares\\nKetoconazole 2% shampoo BIW-[id number]\\nKetoconazole 2% cream BID to ears\\nAllegra QAM (continue)\\nClaritin QHS (continue)\\nProtopic ointment 0.1% QD-BID to ears\\nMimyx BID to ears\\nVytone cream BID PRN flares to ears\\nOpzelura cream BID PRN (pt thinks refractory)
Discontinue Regimen: Dupixent 300mg SC every week\\nprednisone 10 mg tablet \\nQuantity: 49.0 Tablet  Days Supply: 22\\nSig: Take 4 PO QAM X4 days, 3 PO QAM X4 days, 2 PO QAM X7 days, 1 PO QAM X7 days then stop
Plan: Will resend Rx once 2/17 labs are rec?d from Dr. Muro
Initiate Treatment: mupirocin 2 % topical ointment \\nQuantity: 22.0 g  Days Supply: 30\\nSig: Apply to affected area on nose QD-BID.
Plan: Consider observation vs. BX if patient desires.

## 2023-03-21 NOTE — PROCEDURE: METHOTREXATE MONITORING
Add Additional Dosage: No
Dosage 4 (Mg/Week): 0
Detail Level: Zone
Current Dosage (Optional): 15mg/week
Calculate Cumulative Dosage Automatically?: No - Use Free Text
Document Previous Cumulative Dosage (Historical Patients Only): No - New Methotrexate Patient
Length Of Therapy (Optional): 2 months
Most Recent Cbc (Optional): 3/15/23

## 2023-03-21 NOTE — PROCEDURE: HIGH RISK MEDICATION MONITORING
Itraconazole Counseling:  I discussed with the patient the risks of itraconazole including but not limited to liver damage, nausea/vomiting, neuropathy, and severe allergy.  The patient understands that this medication is best absorbed when taken with acidic beverages such as non-diet cola or ginger ale.  The patient understands that monitoring is required including baseline LFTs and repeat LFTs at intervals.  The patient understands that they are to contact us or the primary physician if concerning signs are noted.
Azithromycin Counseling:  I discussed with the patient the risks of azithromycin including but not limited to GI upset, allergic reaction, drug rash, diarrhea, and yeast infections.
Ivermectin Pregnancy And Lactation Text: This medication is Pregnancy Category C and it isn't known if it is safe during pregnancy. It is also excreted in breast milk.
Infliximab Pregnancy And Lactation Text: This medication is Pregnancy Category B and is considered safe during pregnancy. It is unknown if this medication is excreted in breast milk.
Xolair Counseling:  Patient informed of potential adverse effects including but not limited to fever, muscle aches, rash and allergic reactions.  The patient verbalized understanding of the proper use and possible adverse effects of Xolair.  All of the patient's questions and concerns were addressed.
Bexarotene Counseling:  I discussed with the patient the risks of bexarotene including but not limited to hair loss, dry lips/skin/eyes, liver abnormalities, hyperlipidemia, pancreatitis, depression/suicidal ideation, photosensitivity, drug rash/allergic reactions, hypothyroidism, anemia, leukopenia, infection, cataracts, and teratogenicity.  Patient understands that they will need regular blood tests to check lipid profile, liver function tests, white blood cell count, thyroid function tests and pregnancy test if applicable.
Hydroxychloroquine Pregnancy And Lactation Text: This medication has been shown to cause fetal harm but it isn't assigned a Pregnancy Risk Category. There are small amounts excreted in breast milk.
Xolair Pregnancy And Lactation Text: This medication is Pregnancy Category B and is considered safe during pregnancy. This medication is excreted in breast milk.
Nsaids Counseling: NSAID Counseling: I discussed with the patient that NSAIDs should be taken with food. Prolonged use of NSAIDs can result in the development of stomach ulcers.  Patient advised to stop taking NSAIDs if abdominal pain occurs.  The patient verbalized understanding of the proper use and possible adverse effects of NSAIDs.  All of the patient's questions and concerns were addressed.
Valtrex Pregnancy And Lactation Text: this medication is Pregnancy Category B and is considered safe during pregnancy. This medication is not directly found in breast milk but it's metabolite acyclovir is present.
Eucrisa Pregnancy And Lactation Text: This medication has not been assigned a Pregnancy Risk Category but animal studies failed to show danger with the topical medication. It is unknown if the medication is excreted in breast milk.
Quinolones Pregnancy And Lactation Text: This medication is Pregnancy Category C and it isn't know if it is safe during pregnancy. It is also excreted in breast milk.
Rituxan Counseling:  I discussed with the patient the risks of Rituxan infusions. Side effects can include infusion reactions, severe drug rashes including mucocutaneous reactions, reactivation of latent hepatitis and other infections and rarely progressive multifocal leukoencephalopathy.  All of the patient's questions and concerns were addressed.
Topical Clindamycin Pregnancy And Lactation Text: This medication is Pregnancy Category B and is considered safe during pregnancy. It is unknown if it is excreted in breast milk.
Bexarotene Pregnancy And Lactation Text: This medication is Pregnancy Category X and should not be given to women who are pregnant or may become pregnant. This medication should not be used if you are breast feeding.
Nsaids Pregnancy And Lactation Text: These medications are considered safe up to 30 weeks gestation. It is excreted in breast milk.
Topical Sulfur Applications Counseling: Topical Sulfur Counseling: Patient counseled that this medication may cause skin irritation or allergic reactions.  In the event of skin irritation, the patient was advised to reduce the amount of the drug applied or use it less frequently.   The patient verbalized understanding of the proper use and possible adverse effects of topical sulfur application.  All of the patient's questions and concerns were addressed.
Rifampin Counseling: I discussed with the patient the risks of rifampin including but not limited to liver damage, kidney damage, red-orange body fluids, nausea/vomiting and severe allergy.
Azithromycin Pregnancy And Lactation Text: This medication is considered safe during pregnancy and is also secreted in breast milk.
Hydroquinone Counseling:  Patient advised that medication may result in skin irritation, lightening (hypopigmentation), dryness, and burning.  In the event of skin irritation, the patient was advised to reduce the amount of the drug applied or use it less frequently.  Rarely, spots that are treated with hydroquinone can become darker (pseudoochronosis).  Should this occur, patient instructed to stop medication and call the office. The patient verbalized understanding of the proper use and possible adverse effects of hydroquinone.  All of the patient's questions and concerns were addressed.
Bactrim Counseling:  I discussed with the patient the risks of sulfa antibiotics including but not limited to GI upset, allergic reaction, drug rash, diarrhea, dizziness, photosensitivity, and yeast infections.  Rarely, more serious reactions can occur including but not limited to aplastic anemia, agranulocytosis, methemoglobinemia, blood dyscrasias, liver or kidney failure, lung infiltrates or desquamative/blistering drug rashes.
Ketoconazole Counseling:   Patient counseled regarding improving absorption with orange juice.  Adverse effects include but are not limited to breast enlargement, headache, diarrhea, nausea, upset stomach, liver function test abnormalities, taste disturbance, and stomach pain.  There is a rare possibility of liver failure that can occur when taking ketoconazole. The patient understands that monitoring of LFTs may be required, especially at baseline. The patient verbalized understanding of the proper use and possible adverse effects of ketoconazole.  All of the patient's questions and concerns were addressed.
Isotretinoin Counseling: Patient should get monthly blood tests, not donate blood, not drive at night if vision affected, not share medication, and not undergo elective surgery for 6 months after tx completed. Side effects reviewed, pt to contact office should one occur.
Use Enhanced Medication Counseling?: No
Odomzo Counseling- I discussed with the patient the risks of Odomzo including but not limited to nausea, vomiting, diarrhea, constipation, weight loss, changes in the sense of taste, decreased appetite, muscle spasms, and hair loss.  The patient verbalized understanding of the proper use and possible adverse effects of Odomzo.  All of the patient's questions and concerns were addressed.
Topical Sulfur Applications Pregnancy And Lactation Text: This medication is Pregnancy Category C and has an unknown safety profile during pregnancy. It is unknown if this topical medication is excreted in breast milk.
Rituxan Pregnancy And Lactation Text: This medication is Pregnancy Category C and it isn't know if it is safe during pregnancy. It is unknown if this medication is excreted in breast milk but similar antibodies are known to be excreted.
Azathioprine Counseling:  I discussed with the patient the risks of azathioprine including but not limited to myelosuppression, immunosuppression, hepatotoxicity, lymphoma, and infections.  The patient understands that monitoring is required including baseline LFTs, Creatinine, possible TPMP genotyping and weekly CBCs for the first month and then every 2 weeks thereafter.  The patient verbalized understanding of the proper use and possible adverse effects of azathioprine.  All of the patient's questions and concerns were addressed.
Azathioprine Pregnancy And Lactation Text: This medication is Pregnancy Category D and isn't considered safe during pregnancy. It is unknown if this medication is excreted in breast milk.
Ketoconazole Pregnancy And Lactation Text: This medication is Pregnancy Category C and it isn't know if it is safe during pregnancy. It is also excreted in breast milk and breast feeding isn't recommended.
Siliq Counseling:  I discussed with the patient the risks of Siliq including but not limited to new or worsening depression, suicidal thoughts and behavior, immunosuppression, malignancy, posterior leukoencephalopathy syndrome, and serious infections.  The patient understands that monitoring is required including a PPD at baseline and must alert us or the primary physician if symptoms of infection or other concerning signs are noted. There is also a special program designed to monitor depression which is required with Siliq.
Bactrim Pregnancy And Lactation Text: This medication is Pregnancy Category D and is known to cause fetal risk.  It is also excreted in breast milk.
Isotretinoin Pregnancy And Lactation Text: This medication is Pregnancy Category X and is considered extremely dangerous during pregnancy. It is unknown if it is excreted in breast milk.
High Dose Vitamin A Counseling: Side effects reviewed, pt to contact office should one occur.
Odomzo Pregnancy And Lactation Text: This medication is Pregnancy Category X and is absolutely contraindicated during pregnancy. It is unknown if it is excreted in breast milk.
Zyclara Counseling:  I discussed with the patient the risks of imiquimod including but not limited to erythema, scaling, itching, weeping, crusting, and pain.  Patient understands that the inflammatory response to imiquimod is variable from person to person and was educated regarded proper titration schedule.  If flu-like symptoms develop, patient knows to discontinue the medication and contact us.
Imiquimod Counseling:  I discussed with the patient the risks of imiquimod including but not limited to erythema, scaling, itching, weeping, crusting, and pain.  Patient understands that the inflammatory response to imiquimod is variable from person to person and was educated regarded proper titration schedule.  If flu-like symptoms develop, patient knows to discontinue the medication and contact us.
Imiquimod Pregnancy And Lactation Text: This medication is Pregnancy Category C. It is unknown if this medication is excreted in breast milk.
Cephalexin Counseling: I counseled the patient regarding use of cephalexin as an antibiotic for prophylactic and/or therapeutic purposes. Cephalexin (commonly prescribed under brand name Keflex) is a cephalosporin antibiotic which is active against numerous classes of bacteria, including most skin bacteria. Side effects may include nausea, diarrhea, gastrointestinal upset, rash, hives, yeast infections, and in rare cases, hepatitis, kidney disease, seizures, fever, confusion, neurologic symptoms, and others. Patients with severe allergies to penicillin medications are cautioned that there is about a 10% incidence of cross-reactivity with cephalosporins. When possible, patients with penicillin allergies should use alternatives to cephalosporins for antibiotic therapy.
Siliq Pregnancy And Lactation Text: The risk during pregnancy and breastfeeding is uncertain with this medication.
Terbinafine Counseling: Patient counseling regarding adverse effects of terbinafine including but not limited to headache, diarrhea, rash, upset stomach, liver function test abnormalities, itching, taste/smell disturbance, nausea, abdominal pain, and flatulence.  There is a rare possibility of liver failure that can occur when taking terbinafine.  The patient understands that a baseline LFT and kidney function test may be required. The patient verbalized understanding of the proper use and possible adverse effects of terbinafine.  All of the patient's questions and concerns were addressed.
Cellcept Counseling:  I discussed with the patient the risks of mycophenolate mofetil including but not limited to infection/immunosuppression, GI upset, hypokalemia, hypercholesterolemia, bone marrow suppression, lymphoproliferative disorders, malignancy, GI ulceration/bleed/perforation, colitis, interstitial lung disease, kidney failure, progressive multifocal leukoencephalopathy, and birth defects.  The patient understands that monitoring is required including a baseline creatinine and regular CBC testing. In addition, patient must alert us immediately if symptoms of infection or other concerning signs are noted.
High Dose Vitamin A Pregnancy And Lactation Text: High dose vitamin A therapy is contraindicated during pregnancy and breast feeding.
Cimzia Counseling:  I discussed with the patient the risks of Cimzia including but not limited to immunosuppression, allergic reactions and infections.  The patient understands that monitoring is required including a PPD at baseline and must alert us or the primary physician if symptoms of infection or other concerning signs are noted.
Otezla Counseling: The side effects of Otezla were discussed with the patient, including but not limited to worsening or new depression, weight loss, diarrhea, nausea, upper respiratory tract infection, and headache. Patient instructed to call the office should any adverse effect occur.  The patient verbalized understanding of the proper use and possible adverse effects of Otezla.  All the patient's questions and concerns were addressed.
Terbinafine Pregnancy And Lactation Text: This medication is Pregnancy Category B and is considered safe during pregnancy. It is also excreted in breast milk and breast feeding isn't recommended.
Otezla Pregnancy And Lactation Text: This medication is Pregnancy Category C and it isn't known if it is safe during pregnancy. It is unknown if it is excreted in breast milk.
Minoxidil Counseling: Minoxidil is a topical medication which can increase blood flow where it is applied. It is uncertain how this medication increases hair growth. Side effects are uncommon and include stinging and allergic reactions.
Detail Level: Generalized
Simponi Counseling:  I discussed with the patient the risks of golimumab including but not limited to myelosuppression, immunosuppression, autoimmune hepatitis, demyelinating diseases, lymphoma, and serious infections.  The patient understands that monitoring is required including a PPD at baseline and must alert us or the primary physician if symptoms of infection or other concerning signs are noted.
Cephalexin Pregnancy And Lactation Text: This medication is Pregnancy Category B and considered safe during pregnancy.  It is also excreted in breast milk but can be used safely for shorter doses.
Cimzia Pregnancy And Lactation Text: This medication crosses the placenta but can be considered safe in certain situations. Cimzia may be excreted in breast milk.
Oxybutynin Counseling:  I discussed with the patient the risks of oxybutynin including but not limited to skin rash, drowsiness, dry mouth, difficulty urinating, and blurred vision.
Cyclophosphamide Counseling:  I discussed with the patient the risks of cyclophosphamide including but not limited to hair loss, hormonal abnormalities, decreased fertility, abdominal pain, diarrhea, nausea and vomiting, bone marrow suppression and infection. The patient understands that monitoring is required while taking this medication.
Clindamycin Counseling: I counseled the patient regarding use of clindamycin as an antibiotic for prophylactic and/or therapeutic purposes. Clindamycin is active against numerous classes of bacteria, including skin bacteria. Side effects may include nausea, diarrhea, gastrointestinal upset, rash, hives, yeast infections, and in rare cases, colitis.
Cimetidine Counseling:  I discussed with the patient the risks of Cimetidine including but not limited to gynecomastia, headache, diarrhea, nausea, drowsiness, arrhythmias, pancreatitis, skin rashes, psychosis, bone marrow suppression and kidney toxicity.
Benzoyl Peroxide Counseling: Patient counseled that medicine may cause skin irritation and bleach clothing.  In the event of skin irritation, the patient was advised to reduce the amount of the drug applied or use it less frequently.   The patient verbalized understanding of the proper use and possible adverse effects of benzoyl peroxide.  All of the patient's questions and concerns were addressed.
Picato Counseling:  I discussed with the patient the risks of Picato including but not limited to erythema, scaling, itching, weeping, crusting, and pain.
Rifampin Pregnancy And Lactation Text: This medication is Pregnancy Category C and it isn't know if it is safe during pregnancy. It is also excreted in breast milk and should not be used if you are breast feeding.
Clindamycin Pregnancy And Lactation Text: This medication can be used in pregnancy if certain situations. Clindamycin is also present in breast milk.
Arava Counseling:  Patient counseled regarding adverse effects of Arava including but not limited to nausea, vomiting, abnormalities in liver function tests. Patients may develop mouth sores, rash, diarrhea, and abnormalities in blood counts. The patient understands that monitoring is required including LFTs and blood counts.  There is a rare possibility of scarring of the liver and lung problems that can occur when taking methotrexate. Persistent nausea, loss of appetite, pale stools, dark urine, cough, and shortness of breath should be reported immediately. Patient advised to discontinue Arava treatment and consult with a physician prior to attempting conception. The patient will have to undergo a treatment to eliminate Arava from the body prior to conception.
Cosentyx Counseling:  I discussed with the patient the risks of Cosentyx including but not limited to worsening of Crohn's disease, immunosuppression, allergic reactions and infections.  The patient understands that monitoring is required including a PPD at baseline and must alert us or the primary physician if symptoms of infection or other concerning signs are noted.
Cyclophosphamide Pregnancy And Lactation Text: This medication is Pregnancy Category D and it isn't considered safe during pregnancy. This medication is excreted in breast milk.
Skyrizi Counseling: I discussed with the patient the risks of risankizumab-rzaa including but not limited to immunosuppression, and serious infections.  The patient understands that monitoring is required including a PPD at baseline and must alert us or the primary physician if symptoms of infection or other concerning signs are noted.
Cyclosporine Counseling:  I discussed with the patient the risks of cyclosporine including but not limited to hypertension, gingival hyperplasia,myelosuppression, immunosuppression, liver damage, kidney damage, neurotoxicity, lymphoma, and serious infections. The patient understands that monitoring is required including baseline blood pressure, CBC, CMP, lipid panel and uric acid, and then 1-2 times monthly CMP and blood pressure.
Dapsone Counseling: I discussed with the patient the risks of dapsone including but not limited to hemolytic anemia, agranulocytosis, rashes, methemoglobinemia, kidney failure, peripheral neuropathy, headaches, GI upset, and liver toxicity.  Patients who start dapsone require monitoring including baseline LFTs and weekly CBCs for the first month, then every month thereafter.  The patient verbalized understanding of the proper use and possible adverse effects of dapsone.  All of the patient's questions and concerns were addressed.
Sarecycline Counseling: Patient advised regarding possible photosensitivity and discoloration of the teeth, skin, lips, tongue and gums.  Patient instructed to avoid sunlight, if possible.  When exposed to sunlight, patients should wear protective clothing, sunglasses, and sunscreen.  The patient was instructed to call the office immediately if the following severe adverse effects occur:  hearing changes, easy bruising/bleeding, severe headache, or vision changes.  The patient verbalized understanding of the proper use and possible adverse effects of sarecycline.  All of the patient's questions and concerns were addressed.
Benzoyl Peroxide Pregnancy And Lactation Text: This medication is Pregnancy Category C. It is unknown if benzoyl peroxide is excreted in breast milk.
Carac Counseling:  I discussed with the patient the risks of Carac including but not limited to erythema, scaling, itching, weeping, crusting, and pain.
Birth Control Pills Counseling: Birth Control Pill Counseling: I discussed with the patient the potential side effects of OCPs including but not limited to increased risk of stroke, heart attack, thrombophlebitis, deep venous thrombosis, hepatic adenomas, breast changes, GI upset, headaches, and depression.  The patient verbalized understanding of the proper use and possible adverse effects of OCPs. All of the patient's questions and concerns were addressed.
Dupixent Counseling: I discussed with the patient the risks of dupilumab including but not limited to eye infection and irritation, cold sores, injection site reactions, worsening of asthma, allergic reactions and increased risk of parasitic infection.  Live vaccines should be avoided while taking dupilumab. Dupilumab will also interact with certain medications such as warfarin and cyclosporine. The patient understands that monitoring is required and they must alert us or the primary physician if symptoms of infection or other concerning signs are noted.
Doxycycline Counseling:  Patient counseled regarding possible photosensitivity and increased risk for sunburn.  Patient instructed to avoid sunlight, if possible.  When exposed to sunlight, patients should wear protective clothing, sunglasses, and sunscreen.  The patient was instructed to call the office immediately if the following severe adverse effects occur:  hearing changes, easy bruising/bleeding, severe headache, or vision changes.  The patient verbalized understanding of the proper use and possible adverse effects of doxycycline.  All of the patient's questions and concerns were addressed.
Clofazimine Counseling:  I discussed with the patient the risks of clofazimine including but not limited to skin and eye pigmentation, liver damage, nausea/vomiting, gastrointestinal bleeding and allergy.
Protopic Counseling: Patient may experience a mild burning sensation during topical application. Protopic is not approved in children less than 2 years of age. There have been case reports of hematologic and skin malignancies in patients using topical calcineurin inhibitors although causality is questionable.
Sarecycline Pregnancy And Lactation Text: This medication is Pregnancy Category D and not consider safe during pregnancy. It is also excreted in breast milk.
Doxycycline Pregnancy And Lactation Text: This medication is Pregnancy Category D and not consider safe during pregnancy. It is also excreted in breast milk but is considered safe for shorter treatment courses.
Stelara Counseling:  I discussed with the patient the risks of ustekinumab including but not limited to immunosuppression, malignancy, posterior leukoencephalopathy syndrome, and serious infections.  The patient understands that monitoring is required including a PPD at baseline and must alert us or the primary physician if symptoms of infection or other concerning signs are noted.
Dapsone Pregnancy And Lactation Text: This medication is Pregnancy Category C and is not considered safe during pregnancy or breast feeding.
Clofazimine Pregnancy And Lactation Text: This medication is Pregnancy Category C and isn't considered safe during pregnancy. It is excreted in breast milk.
Dupixent Pregnancy And Lactation Text: This medication likely crosses the placenta but the risk for the fetus is uncertain. This medication is excreted in breast milk.
Carac Pregnancy And Lactation Text: This medication is Pregnancy Category X and contraindicated in pregnancy and in women who may become pregnant. It is unknown if this medication is excreted in breast milk.
Cyclosporine Pregnancy And Lactation Text: This medication is Pregnancy Category C and it isn't know if it is safe during pregnancy. This medication is excreted in breast milk.
Doxepin Counseling:  Patient advised that the medication is sedating and not to drive a car after taking this medication. Patient informed of potential adverse effects including but not limited to dry mouth, urinary retention, and blurry vision.  The patient verbalized understanding of the proper use and possible adverse effects of doxepin.  All of the patient's questions and concerns were addressed.
Birth Control Pills Pregnancy And Lactation Text: This medication should be avoided if pregnant and for the first 30 days post-partum.
Tetracycline Counseling: Patient counseled regarding possible photosensitivity and increased risk for sunburn.  Patient instructed to avoid sunlight, if possible.  When exposed to sunlight, patients should wear protective clothing, sunglasses, and sunscreen.  The patient was instructed to call the office immediately if the following severe adverse effects occur:  hearing changes, easy bruising/bleeding, severe headache, or vision changes.  The patient verbalized understanding of the proper use and possible adverse effects of tetracycline.  All of the patient's questions and concerns were addressed. Patient understands to avoid pregnancy while on therapy due to potential birth defects.
Doxepin Pregnancy And Lactation Text: This medication is Pregnancy Category C and it isn't known if it is safe during pregnancy. It is also excreted in breast milk and breast feeding isn't recommended.
Spironolactone Counseling: Patient advised regarding risks of diarrhea, abdominal pain, hyperkalemia, birth defects (for female patients), liver toxicity and renal toxicity. The patient may need blood work to monitor liver and kidney function and potassium levels while on therapy. The patient verbalized understanding of the proper use and possible adverse effects of spironolactone.  All of the patient's questions and concerns were addressed.
Erivedge Counseling- I discussed with the patient the risks of Erivedge including but not limited to nausea, vomiting, diarrhea, constipation, weight loss, changes in the sense of taste, decreased appetite, muscle spasms, and hair loss.  The patient verbalized understanding of the proper use and possible adverse effects of Erivedge.  All of the patient's questions and concerns were addressed.
Protopic Pregnancy And Lactation Text: This medication is Pregnancy Category C. It is unknown if this medication is excreted in breast milk when applied topically.
Taltz Counseling: I discussed with the patient the risks of ixekizumab including but not limited to immunosuppression, serious infections, worsening of inflammatory bowel disease and drug reactions.  The patient understands that monitoring is required including a PPD at baseline and must alert us or the primary physician if symptoms of infection or other concerning signs are noted.
5-Fu Counseling: 5-Fluorouracil Counseling:  I discussed with the patient the risks of 5-fluorouracil including but not limited to erythema, scaling, itching, weeping, crusting, and pain.
Enbrel Counseling:  I discussed with the patient the risks of etanercept including but not limited to myelosuppression, immunosuppression, autoimmune hepatitis, demyelinating diseases, lymphoma, and infections.  The patient understands that monitoring is required including a PPD at baseline and must alert us or the primary physician if symptoms of infection or other concerning signs are noted.
Colchicine Counseling:  Patient counseled regarding adverse effects including but not limited to stomach upset (nausea, vomiting, stomach pain, or diarrhea).  Patient instructed to limit alcohol consumption while taking this medication.  Colchicine may reduce blood counts especially with prolonged use.  The patient understands that monitoring of kidney function and blood counts may be required, especially at baseline. The patient verbalized understanding of the proper use and possible adverse effects of colchicine.  All of the patient's questions and concerns were addressed.
Methotrexate Counseling:  Patient counseled regarding adverse effects of methotrexate including but not limited to nausea, vomiting, abnormalities in liver function tests. Patients may develop mouth sores, rash, diarrhea, and abnormalities in blood counts. The patient understands that monitoring is required including LFT's and blood counts.  There is a rare possibility of scarring of the liver and lung problems that can occur when taking methotrexate. Persistent nausea, loss of appetite, pale stools, dark urine, cough, and shortness of breath should be reported immediately. Patient advised to discontinue methotrexate treatment at least three months before attempting to become pregnant.  I discussed the need for folate supplements while taking methotrexate.  These supplements can decrease side effects during methotrexate treatment. The patient verbalized understanding of the proper use and possible adverse effects of methotrexate.  All of the patient's questions and concerns were addressed.
Erythromycin Counseling:  I discussed with the patient the risks of erythromycin including but not limited to GI upset, allergic reaction, drug rash, diarrhea, increase in liver enzymes, and yeast infections.
Spironolactone Pregnancy And Lactation Text: This medication can cause feminization of the male fetus and should be avoided during pregnancy. The active metabolite is also found in breast milk.
Erythromycin Pregnancy And Lactation Text: This medication is Pregnancy Category B and is considered safe during pregnancy. It is also excreted in breast milk.
Methotrexate Pregnancy And Lactation Text: This medication is Pregnancy Category X and is known to cause fetal harm. This medication is excreted in breast milk.
Hydroxyzine Counseling: Patient advised that the medication is sedating and not to drive a car after taking this medication.  Patient informed of potential adverse effects including but not limited to dry mouth, urinary retention, and blurry vision.  The patient verbalized understanding of the proper use and possible adverse effects of hydroxyzine.  All of the patient's questions and concerns were addressed.
Solaraze Counseling:  I discussed with the patient the risks of Solaraze including but not limited to erythema, scaling, itching, weeping, crusting, and pain.
Solaraze Pregnancy And Lactation Text: This medication is Pregnancy Category B and is considered safe. There is some data to suggest avoiding during the third trimester. It is unknown if this medication is excreted in breast milk.
Hydroxyzine Pregnancy And Lactation Text: This medication is not safe during pregnancy and should not be taken. It is also excreted in breast milk and breast feeding isn't recommended.
Metronidazole Counseling:  I discussed with the patient the risks of metronidazole including but not limited to seizures, nausea/vomiting, a metallic taste in the mouth, nausea/vomiting and severe allergy.
Humira Counseling:  I discussed with the patient the risks of adalimumab including but not limited to myelosuppression, immunosuppression, autoimmune hepatitis, demyelinating diseases, lymphoma, and serious infections.  The patient understands that monitoring is required including a PPD at baseline and must alert us or the primary physician if symptoms of infection or other concerning signs are noted.
Prednisone Counseling:  I discussed with the patient the risks of prolonged use of prednisone including but not limited to weight gain, insomnia, osteoporosis, mood changes, diabetes, susceptibility to infection, glaucoma and high blood pressure.  In cases where prednisone use is prolonged, patients should be monitored with blood pressure checks, serum glucose levels and an eye exam.  Additionally, the patient may need to be placed on GI prophylaxis, PCP prophylaxis, and calcium and vitamin D supplementation and/or a bisphosphonate.  The patient verbalized understanding of the proper use and the possible adverse effects of prednisone.  All of the patient's questions and concerns were addressed.
Gabapentin Counseling: I discussed with the patient the risks of gabapentin including but not limited to dizziness, somnolence, fatigue and ataxia.
Tremfya Counseling: I discussed with the patient the risks of guselkumab including but not limited to immunosuppression, serious infections, worsening of inflammatory bowel disease and drug reactions.  The patient understands that monitoring is required including a PPD at baseline and must alert us or the primary physician if symptoms of infection or other concerning signs are noted.
SSKI Counseling:  I discussed with the patient the risks of SSKI including but not limited to thyroid abnormalities, metallic taste, GI upset, fever, headache, acne, arthralgias, paraesthesias, lymphadenopathy, easy bleeding, arrhythmias, and allergic reaction.
Drysol Counseling:  I discussed with the patient the risks of drysol/aluminum chloride including but not limited to skin rash, itching, irritation, burning.
Drysol Pregnancy And Lactation Text: This medication is considered safe during pregnancy and breast feeding.
Sski Pregnancy And Lactation Text: This medication is Pregnancy Category D and isn't considered safe during pregnancy. It is excreted in breast milk.
Topical Retinoid counseling:  Patient advised to apply a pea-sized amount only at bedtime and wait 30 minutes after washing their face before applying.  If too drying, patient may add a non-comedogenic moisturizer. The patient verbalized understanding of the proper use and possible adverse effects of retinoids.  All of the patient's questions and concerns were addressed.
Fluconazole Counseling:  Patient counseled regarding adverse effects of fluconazole including but not limited to headache, diarrhea, nausea, upset stomach, liver function test abnormalities, taste disturbance, and stomach pain.  There is a rare possibility of liver failure that can occur when taking fluconazole.  The patient understands that monitoring of LFTs and kidney function test may be required, especially at baseline. The patient verbalized understanding of the proper use and possible adverse effects of fluconazole.  All of the patient's questions and concerns were addressed.
Metronidazole Pregnancy And Lactation Text: This medication is Pregnancy Category B and considered safe during pregnancy.  It is also excreted in breast milk.
Glycopyrrolate Counseling:  I discussed with the patient the risks of glycopyrrolate including but not limited to skin rash, drowsiness, dry mouth, difficulty urinating, and blurred vision.
Minocycline Counseling: Patient advised regarding possible photosensitivity and discoloration of the teeth, skin, lips, tongue and gums.  Patient instructed to avoid sunlight, if possible.  When exposed to sunlight, patients should wear protective clothing, sunglasses, and sunscreen.  The patient was instructed to call the office immediately if the following severe adverse effects occur:  hearing changes, easy bruising/bleeding, severe headache, or vision changes.  The patient verbalized understanding of the proper use and possible adverse effects of minocycline.  All of the patient's questions and concerns were addressed.
Elidel Counseling: Patient may experience a mild burning sensation during topical application. Elidel is not approved in children less than 2 years of age. There have been case reports of hematologic and skin malignancies in patients using topical calcineurin inhibitors although causality is questionable.
Albendazole Counseling:  I discussed with the patient the risks of albendazole including but not limited to cytopenia, kidney damage, nausea/vomiting and severe allergy.  The patient understands that this medication is being used in an off-label manner.
Thalidomide Counseling: I discussed with the patient the risks of thalidomide including but not limited to birth defects, anxiety, weakness, chest pain, dizziness, cough and severe allergy.
Tazorac Counseling:  Patient advised that medication is irritating and drying.  Patient may need to apply sparingly and wash off after an hour before eventually leaving it on overnight.  The patient verbalized understanding of the proper use and possible adverse effects of tazorac.  All of the patient's questions and concerns were addressed.
Glycopyrrolate Pregnancy And Lactation Text: This medication is Pregnancy Category B and is considered safe during pregnancy. It is unknown if it is excreted breast milk.
Ilumya Counseling: I discussed with the patient the risks of tildrakizumab including but not limited to immunosuppression, malignancy, posterior leukoencephalopathy syndrome, and serious infections.  The patient understands that monitoring is required including a PPD at baseline and must alert us or the primary physician if symptoms of infection or other concerning signs are noted.
Xeljanz Counseling: I discussed with the patient the risks of Xeljanz therapy including increased risk of infection, liver issues, headache, diarrhea, or cold symptoms. Live vaccines should be avoided. They were instructed to call if they have any problems.
Xelnickyz Pregnancy And Lactation Text: This medication is Pregnancy Category D and is not considered safe during pregnancy.  The risk during breast feeding is also uncertain.
Acitretin Counseling:  I discussed with the patient the risks of acitretin including but not limited to hair loss, dry lips/skin/eyes, liver damage, hyperlipidemia, depression/suicidal ideation, photosensitivity.  Serious rare side effects can include but are not limited to pancreatitis, pseudotumor cerebri, bony changes, clot formation/stroke/heart attack.  Patient understands that alcohol is contraindicated since it can result in liver toxicity and significantly prolong the elimination of the drug by many years.
Griseofulvin Counseling:  I discussed with the patient the risks of griseofulvin including but not limited to photosensitivity, cytopenia, liver damage, nausea/vomiting and severe allergy.  The patient understands that this medication is best absorbed when taken with a fatty meal (e.g., ice cream or french fries).
Hydroxychloroquine Counseling:  I discussed with the patient that a baseline ophthalmologic exam is needed at the start of therapy and every year thereafter while on therapy. A CBC may also be warranted for monitoring.  The side effects of this medication were discussed with the patient, including but not limited to agranulocytosis, aplastic anemia, seizures, rashes, retinopathy, and liver toxicity. Patient instructed to call the office should any adverse effect occur.  The patient verbalized understanding of the proper use and possible adverse effects of Plaquenil.  All the patient's questions and concerns were addressed.
Acitretin Pregnancy And Lactation Text: This medication is Pregnancy Category X and should not be given to women who are pregnant or may become pregnant in the future. This medication is excreted in breast milk.
Valtrex Counseling: I discussed with the patient the risks of valacyclovir including but not limited to kidney damage, nausea, vomiting and severe allergy.  The patient understands that if the infection seems to be worsening or is not improving, they are to call.
Ivermectin Counseling:  Patient instructed to take medication on an empty stomach with a full glass of water.  Patient informed of potential adverse effects including but not limited to nausea, diarrhea, dizziness, itching, and swelling of the extremities or lymph nodes.  The patient verbalized understanding of the proper use and possible adverse effects of ivermectin.  All of the patient's questions and concerns were addressed.
Tazorac Pregnancy And Lactation Text: This medication is not safe during pregnancy. It is unknown if this medication is excreted in breast milk.
Topical Clindamycin Counseling: Patient counseled that this medication may cause skin irritation or allergic reactions.  In the event of skin irritation, the patient was advised to reduce the amount of the drug applied or use it less frequently.   The patient verbalized understanding of the proper use and possible adverse effects of clindamycin.  All of the patient's questions and concerns were addressed.
Eucrisa Counseling: Patient may experience a mild burning sensation during topical application. Eucrisa is not approved in children less than 2 years of age.
Quinolones Counseling:  I discussed with the patient the risks of fluoroquinolones including but not limited to GI upset, allergic reaction, drug rash, diarrhea, dizziness, photosensitivity, yeast infections, liver function test abnormalities, tendonitis/tendon rupture.
Infliximab Counseling:  I discussed with the patient the risks of infliximab including but not limited to myelosuppression, immunosuppression, autoimmune hepatitis, demyelinating diseases, lymphoma, and serious infections.  The patient understands that monitoring is required including a PPD at baseline and must alert us or the primary physician if symptoms of infection or other concerning signs are noted.
Griseofulvin Pregnancy And Lactation Text: This medication is Pregnancy Category X and is known to cause serious birth defects. It is unknown if this medication is excreted in breast milk but breast feeding should be avoided.
Finasteride Pregnancy And Lactation Text: This medication is absolutely contraindicated during pregnancy. It is unknown if it is excreted in breast milk.
Rhofade Pregnancy And Lactation Text: This medication has not been assigned a Pregnancy Risk Category. It is unknown if the medication is excreted in breast milk.
Dutasteride Pregnancy And Lactation Text: This medication is absolutely contraindicated in women, especially during pregnancy and breast feeding. Feminization of male fetuses is possible if taking while pregnant.
Opioid Pregnancy And Lactation Text: These medications can lead to premature delivery and should be avoided during pregnancy. These medications are also present in breast milk in small amounts.
Calcipotriene Pregnancy And Lactation Text: This medication has not been proven safe during pregnancy. It is unknown if this medication is excreted in breast milk.
Tranexamic Acid Counseling:  Patient advised of the small risk of bleeding problems with tranexamic acid. They were also instructed to call if they developed any nausea, vomiting or diarrhea. All of the patient's questions and concerns were addressed.
Propranolol Counseling:  I discussed with the patient the risks of propranolol including but not limited to low heart rate, low blood pressure, low blood sugar, restlessness and increased cold sensitivity. They should call the office if they experience any of these side effects.
Calcipotriene Counseling:  I discussed with the patient the risks of calcipotriene including but not limited to erythema, scaling, itching, and irritation.
Winlevi Pregnancy And Lactation Text: This medication is considered safe during pregnancy and breastfeeding.
Azelaic Acid Counseling: Patient counseled that medicine may cause skin irritation and to avoid applying near the eyes.  In the event of skin irritation, the patient was advised to reduce the amount of the drug applied or use it less frequently.   The patient verbalized understanding of the proper use and possible adverse effects of azelaic acid.  All of the patient's questions and concerns were addressed.
Libtayo Pregnancy And Lactation Text: This medication is contraindicated in pregnancy and when breast feeding.
Propranolol Pregnancy And Lactation Text: This medication is Pregnancy Category C and it isn't known if it is safe during pregnancy. It is excreted in breast milk.
Opioid Counseling: I discussed with the patient the potential side effects of opioids including but not limited to addiction, altered mental status, and depression. I stressed avoiding alcohol, benzodiazepines, muscle relaxants and sleep aids unless specifically okayed by a physician. The patient verbalized understanding of the proper use and possible adverse effects of opioids. All of the patient's questions and concerns were addressed. They were instructed to flush the remaining pills down the toilet if they did not need them for pain.
Tranexamic Acid Pregnancy And Lactation Text: It is unknown if this medication is safe during pregnancy or breast feeding.
Topical Ketoconazole Counseling: Patient counseled that this medication may cause skin irritation or allergic reactions.  In the event of skin irritation, the patient was advised to reduce the amount of the drug applied or use it less frequently.   The patient verbalized understanding of the proper use and possible adverse effects of ketoconazole.  All of the patient's questions and concerns were addressed.
Wartpeel Counseling:  I discussed with the patient the risks of Wartpeel including but not limited to erythema, scaling, itching, weeping, crusting, and pain.
Oral Minoxidil Pregnancy And Lactation Text: This medication should only be used when clearly needed if you are pregnant, attempting to become pregnant or breast feeding.
Niacinamide Pregnancy And Lactation Text: These medications are considered safe during pregnancy.
Dutasteride Counseling: Dustasteride Counseling:  I discussed with the patient the risks of use of dutasteride including but not limited to decreased libido, decreased ejaculate volume, and gynecomastia. Women who can become pregnant should not handle medication.  All of the patient's questions and concerns were addressed.
Mirvaso Counseling: Mirvaso is a topical medication which can decrease superficial blood flow where applied. Side effects are uncommon and include stinging, redness and allergic reactions.
Rhofade Counseling: Rhofade is a topical medication which can decrease superficial blood flow where applied. Side effects are uncommon and include stinging, redness and allergic reactions.
Oral Minoxidil Counseling- I discussed with the patient the risks of oral minoxidil including but not limited to shortness of breath, swelling of the feet or ankles, dizziness, lightheadedness, unwanted hair growth and allergic reaction.  The patient verbalized understanding of the proper use and possible adverse effects of oral minoxidil.  All of the patient's questions and concerns were addressed.
Winlevi Counseling:  I discussed with the patient the risks of topical clascoterone including but not limited to erythema, scaling, itching, and stinging. Patient voiced their understanding.
Niacinamide Counseling: I recommended taking niacin or niacinamide, also know as vitamin B3, twice daily. Recent evidence suggests that taking vitamin B3 (500 mg twice daily) can reduce the risk of actinic keratoses and non-melanoma skin cancers. Side effects of vitamin B3 include flushing and headache.
Finasteride Counseling:  I discussed with the patient the risks of use of finasteride including but not limited to decreased libido, decreased ejaculate volume, gynecomastia, and depression. Women should not handle medication.  All of the patient's questions and concerns were addressed.
Libtayo Counseling- I discussed with the patient the risks of Libtayo including but not limited to nausea, vomiting, diarrhea, and bone or muscle pain.  The patient verbalized understanding of the proper use and possible adverse effects of Libtayo.  All of the patient's questions and concerns were addressed.
Rinvoq Counseling: I discussed with the patient the risks of Rinvoq therapy including but not limited to upper respiratory tract infections, shingles, cold sores, bronchitis, nausea, cough, fever, acne, and headache. Live vaccines should be avoided.  This medication has been linked to serious infections; higher rate of mortality; malignancy and lymphoproliferative disorders; major adverse cardiovascular events; thrombosis; thrombocytopenia, anemia, and neutropenia; lipid elevations; liver enzyme elevations; and gastrointestinal perforations.
Rinvoq Pregnancy And Lactation Text: Based on animal studies, Rinvoq may cause embryo-fetal harm when administered to pregnant women.  The medication should not be used in pregnancy.  Breastfeeding is not recommended during treatment and for 6 days after the last dose.
Sotyktu Counseling:  I discussed the most common side effects of Sotyktu including: common cold, sore throat, sinus infections, cold sores, canker sores, folliculitis, and acne.? I also discussed more serious side effects of Sotyktu including but not limited to: serious allergic reactions; increased risk for infections such as TB; cancers such as lymphomas; rhabdomyolysis and elevated CPK; and elevated triglycerides and liver enzymes.?
Sotyktu Pregnancy And Lactation Text: There is insufficient data to evaluate whether or not Sotyktu is safe to use during pregnancy.? ?It is not known if Sotyktu passes into breast milk and whether or not it is safe to use when breastfeeding.??
Olanzapine Counseling- I discussed with the patient the common side effects of olanzapine including but are not limited to: lack of energy, dry mouth, increased appetite, sleepiness, tremor, constipation, dizziness, changes in behavior, or restlessness.  Explained that teenagers are more likely to experience headaches, abdominal pain, pain in the arms or legs, tiredness, and sleepiness.  Serious side effects include but are not limited: increased risk of death in elderly patients who are confused, have memory loss, or dementia-related psychosis; hyperglycemia; increased cholesterol and triglycerides; and weight gain.
Olanzapine Pregnancy And Lactation Text: This medication is pregnancy category C.   There are no adequate and well controlled trials with olanzapine in pregnant females.  Olanzapine should be used during pregnancy only if the potential benefit justifies the potential risk to the fetus.   In a study in lactating healthy women, olanzapine was excreted in breast milk.  It is recommended that women taking olanzapine should not breast feed.
Cibinqo Counseling: I discussed with the patient the risks of Cibinqo therapy including but not limited to common cold, nausea, headache, cold sores, increased blood CPK levels, dizziness, UTIs, fatigue, acne, and vomitting. Live vaccines should be avoided.  This medication has been linked to serious infections; higher rate of mortality; malignancy and lymphoproliferative disorders; major adverse cardiovascular events; thrombosis; thrombocytopenia and lymphopenia; lipid elevations; and retinal detachment.
Opzelura Counseling:  I discussed with the patient the risks of Opzelura including but not limited to nasopharngitis, bronchitis, ear infection, eosinophila, hives, diarrhea, folliculitis, tonsillitis, and rhinorrhea.  Taken orally, this medication has been linked to serious infections; higher rate of mortality; malignancy and lymphoproliferative disorders; major adverse cardiovascular events; thrombosis; thrombocytopenia, anemia, and neutropenia; and lipid elevations.
Adbry Counseling: I discussed with the patient the risks of tralokinumab including but not limited to eye infection and irritation, cold sores, injection site reactions, worsening of asthma, allergic reactions and increased risk of parasitic infection.  Live vaccines should be avoided while taking tralokinumab. The patient understands that monitoring is required and they must alert us or the primary physician if symptoms of infection or other concerning signs are noted.
Cibinqo Pregnancy And Lactation Text: It is unknown if this medication will adversely affect pregnancy or breast feeding.  You should not take this medication if you are currently pregnant or planning a pregnancy or while breastfeeding.
Opzelura Pregnancy And Lactation Text: There is insufficient data to evaluate drug-associated risk for major birth defects, miscarriage, or other adverse maternal or fetal outcomes.  There is a pregnancy registry that monitors pregnancy outcomes in pregnant persons exposed to the medication during pregnancy.  It is unknown if this medication is excreted in breast milk.  Do not breastfeed during treatment and for about 4 weeks after the last dose.
Adbry Pregnancy And Lactation Text: It is unknown if this medication will adversely affect pregnancy or breast feeding.
Low Dose Naltrexone Counseling- I discussed with the patient the potential risks and side effects of low dose naltrexone including but not limited to: more vivid dreams, headaches, nausea, vomiting, abdominal pain, fatigue, dizziness, and anxiety.
Olumiant Counseling: I discussed with the patient the risks of Olumiant therapy including but not limited to upper respiratory tract infections, shingles, cold sores, and nausea. Live vaccines should be avoided.  This medication has been linked to serious infections; higher rate of mortality; malignancy and lymphoproliferative disorders; major adverse cardiovascular events; thrombosis; gastrointestinal perforations; neutropenia; lymphopenia; anemia; liver enzyme elevations; and lipid elevations.
Olumiant Pregnancy And Lactation Text: Based on animal studies, Olumiant may cause embryo-fetal harm when administered to pregnant women.  The medication should not be used in pregnancy.  Breastfeeding is not recommended during treatment.
Low Dose Naltrexone Pregnancy And Lactation Text: Naltrexone is pregnancy category C.  There have been no adequate and well-controlled studies in pregnant women.  It should be used in pregnancy only if the potential benefit justifies the potential risk to the fetus.   Limited data indicates that naltrexone is minimally excreted into breastmilk.

## 2023-03-21 NOTE — PROCEDURE: ADDITIONAL NOTES
Render Risk Assessment In Note?: no
Detail Level: Simple
Additional Notes: IGA score today: 3
Additional Notes: Labs a month from todays OV. Lab slip provided.

## 2023-04-04 ENCOUNTER — APPOINTMENT (RX ONLY)
Dept: URBAN - METROPOLITAN AREA CLINIC 125 | Facility: CLINIC | Age: 58
Setting detail: DERMATOLOGY
End: 2023-04-04

## 2023-04-04 DIAGNOSIS — Z79.899 OTHER LONG TERM (CURRENT) DRUG THERAPY: ICD-10-CM

## 2023-04-04 DIAGNOSIS — L259 CONTACT DERMATITIS AND OTHER ECZEMA, UNSPECIFIED CAUSE: ICD-10-CM | Status: INADEQUATELY CONTROLLED

## 2023-04-04 DIAGNOSIS — K13.0 DISEASES OF LIPS: ICD-10-CM

## 2023-04-04 DIAGNOSIS — L20.89 OTHER ATOPIC DERMATITIS: ICD-10-CM | Status: IMPROVED

## 2023-04-04 PROBLEM — L30.9 DERMATITIS, UNSPECIFIED: Status: ACTIVE | Noted: 2023-04-04

## 2023-04-04 PROCEDURE — ? METHOTREXATE MONITORING

## 2023-04-04 PROCEDURE — ? COUNSELING

## 2023-04-04 PROCEDURE — ? ADDITIONAL NOTES

## 2023-04-04 PROCEDURE — ? HIGH RISK MEDICATION MONITORING

## 2023-04-04 PROCEDURE — 99214 OFFICE O/P EST MOD 30 MIN: CPT

## 2023-04-04 PROCEDURE — ? PRESCRIPTION

## 2023-04-04 PROCEDURE — ? TREATMENT REGIMEN

## 2023-04-04 PROCEDURE — ? CHRONOLOGY OF PRESENT ILLNESS

## 2023-04-04 PROCEDURE — ? PRESCRIPTION MEDICATION MANAGEMENT

## 2023-04-04 RX ORDER — TRIAMCINOLONE ACETONIDE 1 MG/G
CREAM TOPICAL
Qty: 453.6 | Refills: 1 | Status: ERX | COMMUNITY
Start: 2023-04-04

## 2023-04-04 RX ADMIN — TRIAMCINOLONE ACETONIDE: 1 CREAM TOPICAL at 00:00

## 2023-04-04 ASSESSMENT — LOCATION SIMPLE DESCRIPTION DERM
LOCATION SIMPLE: LEFT AXILLARY VAULT
LOCATION SIMPLE: LEFT SCALP
LOCATION SIMPLE: RIGHT FOREARM
LOCATION SIMPLE: LEFT FOREARM
LOCATION SIMPLE: RIGHT EAR
LOCATION SIMPLE: RIGHT AXILLARY VAULT
LOCATION SIMPLE: RIGHT HAND
LOCATION SIMPLE: LEFT EAR
LOCATION SIMPLE: LEFT LIP
LOCATION SIMPLE: LEFT HAND

## 2023-04-04 ASSESSMENT — LOCATION DETAILED DESCRIPTION DERM
LOCATION DETAILED: LEFT SUPERIOR VERMILION LIP
LOCATION DETAILED: LEFT INFERIOR CRUS OF ANTIHELIX
LOCATION DETAILED: RIGHT CYMBA CONCHA
LOCATION DETAILED: LEFT RADIAL DORSAL HAND
LOCATION DETAILED: RIGHT VENTRAL PROXIMAL FOREARM
LOCATION DETAILED: RIGHT AXILLARY VAULT
LOCATION DETAILED: RIGHT ULNAR DORSAL HAND
LOCATION DETAILED: LEFT VENTRAL PROXIMAL FOREARM
LOCATION DETAILED: LEFT MEDIAL FRONTAL SCALP
LOCATION DETAILED: LEFT AXILLARY VAULT

## 2023-04-04 ASSESSMENT — LOCATION ZONE DERM
LOCATION ZONE: LIP
LOCATION ZONE: EAR
LOCATION ZONE: ARM
LOCATION ZONE: HAND
LOCATION ZONE: AXILLAE
LOCATION ZONE: SCALP

## 2023-04-04 ASSESSMENT — SEVERITY ASSESSMENT 2020: SEVERITY 2020: MODERATE

## 2023-04-04 ASSESSMENT — BSA ECZEMA: % BODY COVERED IN ECZEMA: 12

## 2023-04-04 ASSESSMENT — ITCH NUMERIC RATING SCALE: HOW SEVERE IS YOUR ITCHING?: 3

## 2023-04-04 NOTE — PROCEDURE: TREATMENT REGIMEN
Detail Level: Generalized
Initiate Treatment: Dr. Poe PRN\\nTAC 0.1% cream BID to jumpstart it
Plan: Patient has lab slip and will get labs prior to next appointment

## 2023-04-04 NOTE — PROCEDURE: COUNSELING
Detail Level: Zone
Petrolatum Recommendations: Dr. Dan?s chapstick PRN
Aquaphor Recommendations: Aquaphor multiple times a day

## 2023-04-04 NOTE — HPI: RASH
What Type Of Note Output Would You Prefer (Optional)?: Standard Output
How Severe Is Your Rash?: moderate
Is This A New Presentation, Or A Follow-Up?: Rash
Additional History: Rash started in right armpit on Sunday and now her lips feel rashy since this morning.
How Severe Is Your Rash?: mild

## 2023-04-21 ENCOUNTER — APPOINTMENT (RX ONLY)
Dept: URBAN - METROPOLITAN AREA CLINIC 125 | Facility: CLINIC | Age: 58
Setting detail: DERMATOLOGY
End: 2023-04-21

## 2023-04-21 ENCOUNTER — RX ONLY (OUTPATIENT)
Age: 58
Setting detail: RX ONLY
End: 2023-04-21

## 2023-04-21 DIAGNOSIS — Z79.899 OTHER LONG TERM (CURRENT) DRUG THERAPY: ICD-10-CM

## 2023-04-21 DIAGNOSIS — L72.0 EPIDERMAL CYST: ICD-10-CM

## 2023-04-21 DIAGNOSIS — K13.0 DISEASES OF LIPS: ICD-10-CM

## 2023-04-21 DIAGNOSIS — L259 CONTACT DERMATITIS AND OTHER ECZEMA, UNSPECIFIED CAUSE: ICD-10-CM

## 2023-04-21 DIAGNOSIS — L20.89 OTHER ATOPIC DERMATITIS: ICD-10-CM

## 2023-04-21 PROBLEM — D48.5 NEOPLASM OF UNCERTAIN BEHAVIOR OF SKIN: Status: ACTIVE | Noted: 2023-04-21

## 2023-04-21 PROBLEM — L30.9 DERMATITIS, UNSPECIFIED: Status: ACTIVE | Noted: 2023-04-21

## 2023-04-21 PROCEDURE — ? OBSERVATION AND MEASURE

## 2023-04-21 PROCEDURE — ? TREATMENT REGIMEN

## 2023-04-21 PROCEDURE — ? CHRONOLOGY OF PRESENT ILLNESS

## 2023-04-21 PROCEDURE — ? HIGH RISK MEDICATION MONITORING

## 2023-04-21 PROCEDURE — ? PRESCRIPTION MEDICATION MANAGEMENT

## 2023-04-21 PROCEDURE — ? METHOTREXATE MONITORING

## 2023-04-21 PROCEDURE — ? COUNSELING

## 2023-04-21 PROCEDURE — 99214 OFFICE O/P EST MOD 30 MIN: CPT

## 2023-04-21 PROCEDURE — ? ORDER TESTS

## 2023-04-21 PROCEDURE — ? ADDITIONAL NOTES

## 2023-04-21 RX ORDER — METHOTREXATE SODIUM 2.5 MG/1
TABLET ORAL
Qty: 28 | Refills: 0 | Status: ERX

## 2023-04-21 ASSESSMENT — LOCATION SIMPLE DESCRIPTION DERM
LOCATION SIMPLE: LEFT AXILLARY VAULT
LOCATION SIMPLE: RIGHT FOREARM
LOCATION SIMPLE: RIGHT EAR
LOCATION SIMPLE: LEFT EAR
LOCATION SIMPLE: LEFT HAND
LOCATION SIMPLE: LEFT LIP
LOCATION SIMPLE: RIGHT HAND
LOCATION SIMPLE: LEFT SCALP
LOCATION SIMPLE: NOSE
LOCATION SIMPLE: LEFT FOREARM
LOCATION SIMPLE: RIGHT AXILLARY VAULT

## 2023-04-21 ASSESSMENT — LOCATION DETAILED DESCRIPTION DERM
LOCATION DETAILED: RIGHT ULNAR DORSAL HAND
LOCATION DETAILED: LEFT AXILLARY VAULT
LOCATION DETAILED: LEFT RADIAL DORSAL HAND
LOCATION DETAILED: LEFT VENTRAL PROXIMAL FOREARM
LOCATION DETAILED: LEFT SUPERIOR VERMILION LIP
LOCATION DETAILED: LEFT INFERIOR CRUS OF ANTIHELIX
LOCATION DETAILED: RIGHT AXILLARY VAULT
LOCATION DETAILED: LEFT MEDIAL FRONTAL SCALP
LOCATION DETAILED: RIGHT CYMBA CONCHA
LOCATION DETAILED: NASAL DORSUM
LOCATION DETAILED: RIGHT VENTRAL PROXIMAL FOREARM

## 2023-04-21 ASSESSMENT — LOCATION ZONE DERM
LOCATION ZONE: ARM
LOCATION ZONE: AXILLAE
LOCATION ZONE: EAR
LOCATION ZONE: NOSE
LOCATION ZONE: SCALP
LOCATION ZONE: LIP
LOCATION ZONE: HAND

## 2023-04-21 ASSESSMENT — ITCH NUMERIC RATING SCALE: HOW SEVERE IS YOUR ITCHING?: 3

## 2023-04-21 ASSESSMENT — SEVERITY ASSESSMENT 2020: SEVERITY 2020: MODERATE

## 2023-04-21 ASSESSMENT — BSA ECZEMA: % BODY COVERED IN ECZEMA: 6

## 2023-04-21 NOTE — PROCEDURE: PRESCRIPTION MEDICATION MANAGEMENT
Render In Strict Bullet Format?: No
Detail Level: Zone
Continue Regimen: methotrexate sodium 2.5 mg tablet: Take 3 tablets PO BID every Thursday\\nfolic acid 1 mg tablet: Take 1 PO QD\\nSystane eye drops QD \\nOlux 0.05% foam BID PRN flares\\nKetoconazole 2% shampoo BIW-TIW\\nKetoconazole 2% cream BID to ears\\nAllegra QAM \\nClaritin QHS \\nProtopic ointment 0.1% QD-BID to ears\\nMimyx BID to ears\\nVytone cream BID PRN flares to ears
Plan: Oak allergies are bad right now. Probably related to new rash.
Initiate Treatment: triamcinolone acetonide 0.1 % topical cream : AAA rash on armpits, arms BID 2 weeks on, 1 week off, repeat PRN flares. Avoid face, groin
Continue Regimen: :\\n-mupirocin 2 % topical ointment : Apply to affected area on nose QD-BID.

## 2023-04-21 NOTE — PROCEDURE: METHOTREXATE MONITORING
Add Additional Dosage: No
Dosage 4 (Mg/Week): 0
Detail Level: Zone
Current Dosage (Optional): 17.5mg/week
Calculate Cumulative Dosage Automatically?: No - Use Free Text
Document Previous Cumulative Dosage (Historical Patients Only): No - New Methotrexate Patient
Length Of Therapy (Optional): 3 months
Most Recent Cbc (Optional): 3/15/23

## 2023-04-21 NOTE — PROCEDURE: ORDER TESTS
Performing Laboratory: 0
Bill For Surgical Tray: no
Billing Type: Third-Party Bill
Expected Date Of Service: 04/21/2023

## 2023-04-21 NOTE — PROCEDURE: TREATMENT REGIMEN
Detail Level: Generalized
Plan: Lab slip provided today.
Continue Regimen: :\\n-Dr. Poe PRN\\n-TAC 0.1% cream BID to jumpstart it

## 2023-04-21 NOTE — PROCEDURE: CHRONOLOGY OF PRESENT ILLNESS
Chronology Of Present Illness: 8/8/2019:  Rash, located on the right ear and scalp. The rash is scaly, red, and itchy. The rash has been present for years. She has arthritis. She is not currently on any medications. She was started on Olux foam and ketoconazole cream.\\n11/17/2019: scalp is improved, ears are not. 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 She was started on hydrocortisone butuyrate and ketoconazole for the underarms. Biopsy obtained (eczematous dermatitis)\\n1/3/2020: Axillae have improved. scalp and ears as well. Continue current topicals and Pramosone lotion was added for the underarms.\\n2/5/2020: drastic improvement of axillae. She discontinued Hydrocortisone cream and is only using ketoconazole cream. scalp and ears are stable. Start Elidel cream to ears. \\n3/25/2020: scalp and ears not improved. Discussed Dupixent with patient. Consider after Cn19. Continue current topicals.\\n5/27/2020: scalp still flaring. Punch bx obtained of neck. (folliculitis, but not c/w clinical symptoms and presentation. Favor eczematous derm/AD. Continue current topicals. \\n8/4/2020: rash not well controlled on topicals. Start Dupixent enrollment. BSA 4, SEVERE. IGA score 4. Full biologic labs obtained. RX Valtrex given.\\n9/1/2020: DUPIXENT START TODAY. Recommend Systane eyedrops and Valtrex PRN as needed. \\n10/6/2020: Patient is somewhat improved, overall itching is better. Her ears are still very itchy. She is using Olux foam and ketoconazole cream to ears. She complains of new onset of joint pain above elbows and shoulders. She will see Dr. Dai and we will send letter.\\n11/3/2020: patient is improved overall. She still complains of itchy ears. Recommend talking to her audiologist to see if there are other hearing aids that are made of different material. RX Mimyx given for barrier cream. Continue Protopic 0.1% ointment QD PRN to ears and other topicals. Continue Dupixent injections. IGA score 1\\n12/9/2020: patient is much better. Ears are better. Hearing aid manufacturers told her that hearing aids are made out of medical grade hypoallergenic silicone. Itching has improved. Continue current topicals, antihistamines and Dupixent. Start Zonalon cream to ears for itching. Lab order given to patient today. Discussed that injection site reactions should subside.\\n3/17/21: eyes are very bothersome. Discussed possible component of rosacea. Rec warm rice sock compresses in addition to the Systane eye drops. For now continue Dupixent and topicals.\\n4/19/21: Eyes are still dry and bothersome. She states she will be seeing a dry eye specialist on Wednesday. She reports continued itching in the ears. Recommend not making too many changes to treatment regimen due to patient?s upcoming appt with dry eye specialist. Pramosone cream prescribed today for itching in ears, briefly discussed R/B/SE of gabapentin to consider starting at NOV if itching does not improve.\\n7/19/21: stable. Continue Dupixent Q 2 weeks and topicals. Recently diagnosed with gastroenteritis and possible sepsis. 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 Continue Dupixent and all topicals as needed.\\n1/19/22: patient is stable. Continue Dupixent and topicals PRN.\\n4/20/22: her itching (due to oak pollen) starts 3-4 days prior to next injection. Discussed Rinvoque with patient. Discussed it controls the itching better. Discussed risks, benefits and side effects with patient (increase in malignancies, cardio vascular events, blood clots, DVT - usually associated with prior underlying smoking history or other diseases). Discussed injecting Dupixent Q10 days vs Q14 days. Patient desires to continue Dupixent at shorter intervals. Consider adding on Tim inhibitor during environmental allergy season in the future.\\n5/18/22: Patient reports itching is minimally improved on modified Dupixent dosing (every 9-10 days). She reports worst areas are on the arms and inside ears. Re-discussed R/B/SE of Rinvoq in detail today. Patient reports worst 7/10 itch. Patient reports no personal hx of cancer or blood clots. She reports hx of migraines. Patient reports worsened eye dryness since starting Dupixent therapy. She is agreeable to start Rinvoq, insurance coverage check initiated today. Patient to obtain repeat full biologics labs, lab slip provided to patient to get drawn ASAP (MUST be fasting). She is due for her next Dupixent injection this Sunday. She has not been vaccinated for shingles, recommend getting shingles vaccine first ASAP, then inject Dupixent as scheduled on Sunday. F/u in 2 weeks. Sample saved for pt, plan to start therapy at NOV.\\n5/31/22: Patient received shingles vaccine right after LOV. Last Dupixent injection 5/22/22. Patient is agreeable to start Rinvoq today. Rinvoq PA denied, will submit appeal with SCORAD score (39). Symptoms are worsening, causing intense itchiness and sleeplessness. RINVOQ START TODAY. samples provided in office.\\n7/5/22: Patient reports itching was much better when taking Rinvoq. Decreased itch factor from 6-7, down to a 2-3. Patient reports light headedness, nausea, indigestion, twitching in her chest while taking Rinvoq. She stopped it a couple days ago due to symptoms. She is still recovering from the side effects. Recommend starting Opzelura at this point. Consider restarting Rinvoq every other day once symptoms have resolved. Discussed Vtama with patient as well if Opzelura is not improving her symptoms.\\n8/5/22: Pt reports itching has not improved since starting opzelura. Pt to restart dupixent with loading dose after todays OV, sample given to Pt (1 300mg SC injection, pt states she has 1 pen at home). Rx sent to [facility].\\n9/2/22: pt states doing a little better after restarting Dupixent. Minor dry eyes and using Vytone and Olux foam topically currently. Will give pt Vtama sample today. Discussed considering restarting Rinvoq in future at lower dose. Patient states it worked the best but she complained of N/V and mild palpitations.\\n11/10/22: unimproved. First week after injection is good, then she itches again. Using a variety of topicals. Recommend trying Dermeleve cream and Remedy or glove in a bottle. Samples given. She will try injecting every week (supported by samples) x 1 month.\\n1/4/23: worse again. 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 Discussed restarting  Rinvoq at lower dosing vs prednisone taper. She reports insomnia with it. Patient agreeable to prednisone taper. Discussed MTX with patient. Will consider at follow up. Ears very itchy.\\n1/24/23: pt did not tolerate prednisone well, says eczema is unchanged. Itching on ears and shoulders today. Discussed MTX today and test dosage. Discussed regulatory blood work every month while on MTX. Pt would like to start MTX, will send Rx today. Pt to start 1/26. Will discontinue dupixent for now.\\n2/22/23: pt fatigued and foggy on MTX. worse on dose day, better throughout the week. Ears look normal on exam today. Consider inc dosage at f/u if not further improved\\n3/21/23: pt reports less foggy and fatigue on current regimen. Continue same dose of MTX for another month, plan to increase NOV pending labs WNL.\\n4/4/23: new rash armpits. C/w ACD/ICD/eczema. Treat with TAC 0.1%. AD overall getting better. Less itching. Continue MTX per current regimen. Today not evaluated. Work in for rash.\\n04/21/23: pt reports rash under armpits has resolved. States she noticed a new rash yesterday also under LT underarm. As of today we will be increasing MTX dosage to 17.5/ week. Pt to take 3 QAM and 4 QPM only on fridays. Pt reports improvement in itching but still remains moderate to severe (5 to 6). Labs reviewed in OV, lab slip provided to pt for next OV.
Detail Level: Zone

## 2023-05-08 ENCOUNTER — RX ONLY (OUTPATIENT)
Age: 58
Setting detail: RX ONLY
End: 2023-05-08

## 2023-05-08 RX ORDER — METHOTREXATE SODIUM 2.5 MG/1
TABLET ORAL
Qty: 28 | Refills: 0 | Status: ERX

## 2023-05-23 ENCOUNTER — APPOINTMENT (RX ONLY)
Dept: URBAN - METROPOLITAN AREA CLINIC 125 | Facility: CLINIC | Age: 58
Setting detail: DERMATOLOGY
End: 2023-05-23

## 2023-05-23 DIAGNOSIS — Z79.899 OTHER LONG TERM (CURRENT) DRUG THERAPY: ICD-10-CM

## 2023-05-23 DIAGNOSIS — L259 CONTACT DERMATITIS AND OTHER ECZEMA, UNSPECIFIED CAUSE: ICD-10-CM | Status: UNCHANGED

## 2023-05-23 DIAGNOSIS — L20.89 OTHER ATOPIC DERMATITIS: ICD-10-CM | Status: INADEQUATELY CONTROLLED

## 2023-05-23 PROBLEM — D48.5 NEOPLASM OF UNCERTAIN BEHAVIOR OF SKIN: Status: ACTIVE | Noted: 2023-05-23

## 2023-05-23 PROBLEM — L30.9 DERMATITIS, UNSPECIFIED: Status: ACTIVE | Noted: 2023-05-23

## 2023-05-23 PROCEDURE — ? METHOTREXATE MONITORING

## 2023-05-23 PROCEDURE — ? CHRONOLOGY OF PRESENT ILLNESS

## 2023-05-23 PROCEDURE — ? BIOPSY BY SHAVE METHOD

## 2023-05-23 PROCEDURE — 99214 OFFICE O/P EST MOD 30 MIN: CPT | Mod: 25

## 2023-05-23 PROCEDURE — ? ADDITIONAL NOTES

## 2023-05-23 PROCEDURE — ? COUNSELING

## 2023-05-23 PROCEDURE — ? PRESCRIPTION MEDICATION MANAGEMENT

## 2023-05-23 PROCEDURE — ? HIGH RISK MEDICATION MONITORING

## 2023-05-23 PROCEDURE — 11102 TANGNTL BX SKIN SINGLE LES: CPT

## 2023-05-23 PROCEDURE — ? PRESCRIPTION

## 2023-05-23 PROCEDURE — ? ORDER TESTS

## 2023-05-23 PROCEDURE — ? TREATMENT REGIMEN

## 2023-05-23 RX ORDER — METHOTREXATE SODIUM 2.5 MG/1
TABLET ORAL
Qty: 32 | Refills: 0 | Status: ERX

## 2023-05-23 ASSESSMENT — LOCATION ZONE DERM
LOCATION ZONE: SCALP
LOCATION ZONE: HAND
LOCATION ZONE: AXILLAE
LOCATION ZONE: ARM
LOCATION ZONE: EAR

## 2023-05-23 ASSESSMENT — LOCATION DETAILED DESCRIPTION DERM
LOCATION DETAILED: RIGHT CYMBA CONCHA
LOCATION DETAILED: RIGHT AXILLARY VAULT
LOCATION DETAILED: LEFT AXILLARY VAULT
LOCATION DETAILED: LEFT VENTRAL PROXIMAL FOREARM
LOCATION DETAILED: RIGHT VENTRAL PROXIMAL FOREARM
LOCATION DETAILED: LEFT INFERIOR CRUS OF ANTIHELIX
LOCATION DETAILED: RIGHT ULNAR DORSAL HAND
LOCATION DETAILED: LEFT MEDIAL FRONTAL SCALP
LOCATION DETAILED: LEFT RADIAL DORSAL HAND

## 2023-05-23 ASSESSMENT — LOCATION SIMPLE DESCRIPTION DERM
LOCATION SIMPLE: RIGHT FOREARM
LOCATION SIMPLE: LEFT EAR
LOCATION SIMPLE: LEFT AXILLARY VAULT
LOCATION SIMPLE: RIGHT HAND
LOCATION SIMPLE: LEFT FOREARM
LOCATION SIMPLE: RIGHT EAR
LOCATION SIMPLE: RIGHT AXILLARY VAULT
LOCATION SIMPLE: LEFT HAND
LOCATION SIMPLE: LEFT SCALP

## 2023-05-23 ASSESSMENT — BSA ECZEMA: % BODY COVERED IN ECZEMA: 6

## 2023-05-23 ASSESSMENT — SEVERITY ASSESSMENT 2020: SEVERITY 2020: MODERATE

## 2023-05-23 NOTE — PROCEDURE: CHRONOLOGY OF PRESENT ILLNESS
Chronology Of Present Illness: 8/8/2019:  Rash, located on the right ear and scalp. The rash is scaly, red, and itchy. The rash has been present for years. She has arthritis. She is not currently on any medications. She was started on Olux foam and ketoconazole cream.\\n11/17/2019: scalp is improved, ears are not. 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 She was started on hydrocortisone butuyrate and ketoconazole for the underarms. Biopsy obtained (eczematous dermatitis)\\n1/3/2020: Axillae have improved. scalp and ears as well. Continue current topicals and Pramosone lotion was added for the underarms.\\n2/5/2020: drastic improvement of axillae. She discontinued Hydrocortisone cream and is only using ketoconazole cream. scalp and ears are stable. Start Elidel cream to ears. \\n3/25/2020: scalp and ears not improved. Discussed Dupixent with patient. Consider after Cn19. Continue current topicals.\\n5/27/2020: scalp still flaring. Punch bx obtained of neck. (folliculitis, but not c/w clinical symptoms and presentation. Favor eczematous derm/AD. Continue current topicals. \\n8/4/2020: rash not well controlled on topicals. Start Dupixent enrollment. BSA 4, SEVERE. IGA score 4. Full biologic labs obtained. RX Valtrex given.\\n9/1/2020: DUPIXENT START TODAY. Recommend Systane eyedrops and Valtrex PRN as needed. \\n10/6/2020: Patient is somewhat improved, overall itching is better. Her ears are still very itchy. She is using Olux foam and ketoconazole cream to ears. She complains of new onset of joint pain above elbows and shoulders. She will see Dr. Dai and we will send letter.\\n11/3/2020: patient is improved overall. She still complains of itchy ears. Recommend talking to her audiologist to see if there are other hearing aids that are made of different material. RX Mimyx given for barrier cream. Continue Protopic 0.1% ointment QD PRN to ears and other topicals. Continue Dupixent injections. IGA score 1\\n12/9/2020: patient is much better. Ears are better. Hearing aid manufacturers told her that hearing aids are made out of medical grade hypoallergenic silicone. Itching has improved. Continue current topicals, antihistamines and Dupixent. Start Zonalon cream to ears for itching. Lab order given to patient today. Discussed that injection site reactions should subside.\\n3/17/21: eyes are very bothersome. Discussed possible component of rosacea. Rec warm rice sock compresses in addition to the Systane eye drops. For now continue Dupixent and topicals.\\n4/19/21: Eyes are still dry and bothersome. She states she will be seeing a dry eye specialist on Wednesday. She reports continued itching in the ears. Recommend not making too many changes to treatment regimen due to patient?s upcoming appt with dry eye specialist. Pramosone cream prescribed today for itching in ears, briefly discussed R/B/SE of gabapentin to consider starting at NOV if itching does not improve.\\n7/19/21: stable. Continue Dupixent Q 2 weeks and topicals. Recently diagnosed with gastroenteritis and possible sepsis. 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 Continue Dupixent and all topicals as needed.\\n1/19/22: patient is stable. Continue Dupixent and topicals PRN.\\n4/20/22: her itching (due to oak pollen) starts 3-4 days prior to next injection. Discussed Rinvoque with patient. Discussed it controls the itching better. Discussed risks, benefits and side effects with patient (increase in malignancies, cardio vascular events, blood clots, DVT - usually associated with prior underlying smoking history or other diseases). Discussed injecting Dupixent Q10 days vs Q14 days. Patient desires to continue Dupixent at shorter intervals. Consider adding on Tim inhibitor during environmental allergy season in the future.\\n5/18/22: Patient reports itching is minimally improved on modified Dupixent dosing (every 9-10 days). She reports worst areas are on the arms and inside ears. Re-discussed R/B/SE of Rinvoq in detail today. Patient reports worst 7/10 itch. Patient reports no personal hx of cancer or blood clots. She reports hx of migraines. Patient reports worsened eye dryness since starting Dupixent therapy. She is agreeable to start Rinvoq, insurance coverage check initiated today. Patient to obtain repeat full biologics labs, lab slip provided to patient to get drawn ASAP (MUST be fasting). She is due for her next Dupixent injection this Sunday. She has not been vaccinated for shingles, recommend getting shingles vaccine first ASAP, then inject Dupixent as scheduled on Sunday. F/u in 2 weeks. Sample saved for pt, plan to start therapy at NOV.\\n5/31/22: Patient received shingles vaccine right after LOV. Last Dupixent injection 5/22/22. Patient is agreeable to start Rinvoq today. Rinvoq PA denied, will submit appeal with SCORAD score (39). Symptoms are worsening, causing intense itchiness and sleeplessness. RINVOQ START TODAY. samples provided in office.\\n7/5/22: Patient reports itching was much better when taking Rinvoq. Decreased itch factor from 6-7, down to a 2-3. Patient reports light headedness, nausea, indigestion, twitching in her chest while taking Rinvoq. She stopped it a couple days ago due to symptoms. She is still recovering from the side effects. Recommend starting Opzelura at this point. Consider restarting Rinvoq every other day once symptoms have resolved. Discussed Vtama with patient as well if Opzelura is not improving her symptoms.\\n8/5/22: Pt reports itching has not improved since starting opzelura. Pt to restart dupixent with loading dose after todays OV, sample given to Pt (1 300mg SC injection, pt states she has 1 pen at home). Rx sent to [facility].\\n9/2/22: pt states doing a little better after restarting Dupixent. Minor dry eyes and using Vytone and Olux foam topically currently. Will give pt Vtama sample today. Discussed considering restarting Rinvoq in future at lower dose. Patient states it worked the best but she complained of N/V and mild palpitations.\\n11/10/22: unimproved. First week after injection is good, then she itches again. Using a variety of topicals. Recommend trying Dermeleve cream and Remedy or glove in a bottle. Samples given. She will try injecting every week (supported by samples) x 1 month.\\n1/4/23: worse again. 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 Discussed restarting  Rinvoq at lower dosing vs prednisone taper. She reports insomnia with it. Patient agreeable to prednisone taper. Discussed MTX with patient. Will consider at follow up. Ears very itchy.\\n1/24/23: pt did not tolerate prednisone well, says eczema is unchanged. Itching on ears and shoulders today. Discussed MTX today and test dosage. Discussed regulatory blood work every month while on MTX. Pt would like to start MTX, will send Rx today. Pt to start 1/26. Will discontinue dupixent for now. MTX START TODAY.\\n2/22/23: pt fatigued and foggy on MTX. worse on dose day, better throughout the week. Ears look normal on exam today. Consider inc dosage at f/u if not further improved\\n3/21/23: pt reports less foggy and fatigue on current regimen. Continue same dose of MTX for another month, plan to increase NOV pending labs WNL.\\n4/4/23: new rash armpits. C/w ACD/ICD/eczema. Treat with TAC 0.1%. AD overall getting better. Less itching. Continue MTX per current regimen. Today not evaluated. Work in for rash.\\n04/21/23: pt reports rash under armpits has resolved. States she noticed a new rash yesterday also under LT underarm. As of today we will be increasing MTX dosage to 17.5/ week. Pt to take 3 QAM and 4 QPM only on fridays. Pt reports improvement in itching but still remains moderate to severe (5 to 6). Labs reviewed in OV, lab slip provided to pt for next OV.\\n5/23/23: unimproved. Recommend Olux foam to scalp. Recommend increase MTX to 4 pills BID. Patient agreeable.
Detail Level: Zone

## 2023-05-23 NOTE — PROCEDURE: BIOPSY BY SHAVE METHOD
Detail Level: Detailed
Depth Of Biopsy: dermis
Was A Bandage Applied: Yes
Size Of Lesion In Cm: 0.7
X Size Of Lesion In Cm: 0.5
Biopsy Type: H and E
Biopsy Method: Personna blade
Anesthesia Type: 1% lidocaine with epinephrine and a 1:10 solution of 8.4% sodium bicarbonate
Additional Anesthesia Volume In Cc (Will Not Render If 0): 0
Hemostasis: Drysol
Wound Care: Vaseline
Dressing: bandage
Destruction After The Procedure: No
Type Of Destruction Used: Curettage
Curettage Text: The wound bed was treated with curettage after the biopsy was performed.
Cryotherapy Text: The wound bed was treated with cryotherapy after the biopsy was performed.
Electrodesiccation Text: The wound bed was treated with electrodesiccation after the biopsy was performed.
Electrodesiccation And Curettage Text: The wound bed was treated with electrodesiccation and curettage after the biopsy was performed.
Silver Nitrate Text: The wound bed was treated with silver nitrate after the biopsy was performed.
Lab: [id number]
Lab Facility: [id number]
Consent: Verbal consent was obtained and risks were reviewed including but not limited to scarring, infection, bleeding, scabbing, incomplete removal, nerve damage and allergy to anesthesia.
Post-Care Instructions: I reviewed with the patient in detail post-care instructions. Patient is to keep the biopsy site clean with soap and water, and apply Vaseline or Aquaphor twice daily until healed.
Notification Instructions: Patient will be notified of biopsy results. However, patient instructed to call the office if not contacted within 2 weeks.
Billing Type: Third-Party Bill
Information: Selecting Yes will display possible errors in your note based on the variables you have selected. This validation is only offered as a suggestion for you. PLEASE NOTE THAT THE VALIDATION TEXT WILL BE REMOVED WHEN YOU FINALIZE YOUR NOTE. IF YOU WANT TO FAX A PRELIMINARY NOTE YOU WILL NEED TO TOGGLE THIS TO 'NO' IF YOU DO NOT WANT IT IN YOUR FAXED NOTE.

## 2023-05-23 NOTE — PROCEDURE: METHOTREXATE MONITORING
Add Additional Dosage: No
Dosage 4 (Mg/Week): 0
Dosage 2 (Mg/Week): 17.5
Detail Level: Zone
Dosage 1 (Mg/Week): 15
Current Dosage (Optional): 17.5mg/week
Date Dosage 1 Started: 01/24/2023
Date Dosage 2 Started: 04/21/2023
Calculate Cumulative Dosage Automatically?: Yes
Date Dosage Ended/Discontinued (Not Needed For Dosage Change): 05/23/2023
Comments: start 1/24/2023
Most Recent Cbc (Optional): 05/16/2023

## 2023-05-23 NOTE — PROCEDURE: MIPS QUALITY
2027
Quality 431: Preventive Care And Screening: Unhealthy Alcohol Use - Screening: Patient screened for unhealthy alcohol use using a single question and scores less than 2 times per year
Quality 110: Preventive Care And Screening: Influenza Immunization: Influenza Immunization not Administered due to Patient Allergy.
Detail Level: Detailed
Quality 130: Documentation Of Current Medications In The Medical Record: Current Medications Documented
Quality 402: Tobacco Use And Help With Quitting Among Adolescents: Patient screened for tobacco and never smoked
Quality 226: Preventive Care And Screening: Tobacco Use: Screening And Cessation Intervention: Patient screened for tobacco use and is an ex/non-smoker
Quality 431: Preventive Care And Screening: Unhealthy Alcohol Use - Screening: Patient not identified as an unhealthy alcohol user when screened for unhealthy alcohol use using a systematic screening method

## 2023-05-23 NOTE — PROCEDURE: PRESCRIPTION MEDICATION MANAGEMENT
Render In Strict Bullet Format?: No
Detail Level: Zone
Continue Regimen: folic acid 1 mg tablet: Take 1 PO QD\\nOlux 0.05% foam BID PRN flares (ears)\\nKetoconazole 2% shampoo BIW-TIW\\nKetoconazole 2% cream BID to ears
Modify Regimen: increase methotrexate sodium 2.5 mg tablet: Take 4 tablets PO BID every Friday
Continue Regimen: triamcinolone acetonide 0.1 % topical cream : AAA rash on armpits, arms BID 2 weeks on, 1 week off, repeat PRN flares. Avoid face, groin

## 2023-06-20 ENCOUNTER — APPOINTMENT (RX ONLY)
Dept: URBAN - METROPOLITAN AREA CLINIC 125 | Facility: CLINIC | Age: 58
Setting detail: DERMATOLOGY
End: 2023-06-20

## 2023-06-20 DIAGNOSIS — Z79.899 OTHER LONG TERM (CURRENT) DRUG THERAPY: ICD-10-CM

## 2023-06-20 DIAGNOSIS — L20.89 OTHER ATOPIC DERMATITIS: ICD-10-CM

## 2023-06-20 DIAGNOSIS — L82.0 INFLAMED SEBORRHEIC KERATOSIS: ICD-10-CM

## 2023-06-20 DIAGNOSIS — L259 CONTACT DERMATITIS AND OTHER ECZEMA, UNSPECIFIED CAUSE: ICD-10-CM | Status: STABLE

## 2023-06-20 DIAGNOSIS — D22 MELANOCYTIC NEVI: ICD-10-CM

## 2023-06-20 PROBLEM — D22.5 MELANOCYTIC NEVI OF TRUNK: Status: ACTIVE | Noted: 2023-06-20

## 2023-06-20 PROBLEM — L30.9 DERMATITIS, UNSPECIFIED: Status: ACTIVE | Noted: 2023-06-20

## 2023-06-20 PROBLEM — D48.5 NEOPLASM OF UNCERTAIN BEHAVIOR OF SKIN: Status: ACTIVE | Noted: 2023-06-20

## 2023-06-20 PROCEDURE — ? TREATMENT REGIMEN

## 2023-06-20 PROCEDURE — ? PRESCRIPTION

## 2023-06-20 PROCEDURE — ? COUNSELING

## 2023-06-20 PROCEDURE — ? CHRONOLOGY OF PRESENT ILLNESS

## 2023-06-20 PROCEDURE — ? ADDITIONAL NOTES

## 2023-06-20 PROCEDURE — ? HIGH RISK MEDICATION MONITORING

## 2023-06-20 PROCEDURE — ? OBSERVATION AND MEASURE

## 2023-06-20 PROCEDURE — 99214 OFFICE O/P EST MOD 30 MIN: CPT

## 2023-06-20 PROCEDURE — ? PRESCRIPTION MEDICATION MANAGEMENT

## 2023-06-20 PROCEDURE — ? METHOTREXATE MONITORING

## 2023-06-20 PROCEDURE — ? ORDER TESTS

## 2023-06-20 RX ORDER — METHOTREXATE SODIUM 2.5 MG/1
TABLET ORAL
Qty: 32 | Refills: 0 | Status: ERX

## 2023-06-20 ASSESSMENT — LOCATION ZONE DERM
LOCATION ZONE: TRUNK
LOCATION ZONE: HAND
LOCATION ZONE: SCALP
LOCATION ZONE: EAR
LOCATION ZONE: ARM
LOCATION ZONE: NOSE
LOCATION ZONE: AXILLAE

## 2023-06-20 ASSESSMENT — LOCATION SIMPLE DESCRIPTION DERM
LOCATION SIMPLE: LEFT HAND
LOCATION SIMPLE: LEFT SCALP
LOCATION SIMPLE: RIGHT AXILLARY VAULT
LOCATION SIMPLE: UPPER BACK
LOCATION SIMPLE: RIGHT EAR
LOCATION SIMPLE: LEFT AXILLARY VAULT
LOCATION SIMPLE: RIGHT FOREARM
LOCATION SIMPLE: LEFT FOREARM
LOCATION SIMPLE: RIGHT HAND
LOCATION SIMPLE: NOSE
LOCATION SIMPLE: LEFT EAR

## 2023-06-20 ASSESSMENT — LOCATION DETAILED DESCRIPTION DERM
LOCATION DETAILED: RIGHT CYMBA CONCHA
LOCATION DETAILED: LEFT RADIAL DORSAL HAND
LOCATION DETAILED: RIGHT AXILLARY VAULT
LOCATION DETAILED: LEFT INFERIOR CRUS OF ANTIHELIX
LOCATION DETAILED: RIGHT VENTRAL PROXIMAL FOREARM
LOCATION DETAILED: RIGHT ULNAR DORSAL HAND
LOCATION DETAILED: LEFT VENTRAL PROXIMAL FOREARM
LOCATION DETAILED: LEFT AXILLARY VAULT
LOCATION DETAILED: NASAL ROOT
LOCATION DETAILED: INFERIOR THORACIC SPINE
LOCATION DETAILED: LEFT MEDIAL FRONTAL SCALP

## 2023-06-20 ASSESSMENT — SEVERITY ASSESSMENT 2020: SEVERITY 2020: MODERATE

## 2023-06-20 ASSESSMENT — BSA ECZEMA: % BODY COVERED IN ECZEMA: 1

## 2023-06-20 NOTE — PROCEDURE: TREATMENT REGIMEN
Detail Level: Generalized
Plan: Pt has standing order. Last labs 06/13/23 WNL. Plan to get blood work before NOV.
Initiate Treatment: otc hydrocolloid bandage

## 2023-06-20 NOTE — PROCEDURE: PRESCRIPTION MEDICATION MANAGEMENT
Render In Strict Bullet Format?: No
Detail Level: Zone
Continue Regimen: methotrexate sodium 2.5 mg tablet: Take 4 tablets PO BID every Friday\\nfolic acid 1 mg tablet: Take 1 PO QD\\nOlux 0.05% foam BID PRN flares (ears)\\nKetoconazole 2% shampoo BIW-TIW\\nKetoconazole 2% cream BID to ears
Plan: Recommended applying Ketoconazole QD and TAC QD weekends only.
Continue Regimen: triamcinolone acetonide 0.1 % topical cream : AAA rash on armpits, arms BID 2 weeks on, 1 week off, repeat PRN flares. Avoid face, groin\\nKetoconazole 2% cream

## 2023-06-20 NOTE — PROCEDURE: METHOTREXATE MONITORING
Add Additional Dosage: No
Dosage 4 (Mg/Week): 0
Dosage 2 (Mg/Week): 17.5
Detail Level: Zone
Dosage 1 (Mg/Week): 15
Current Dosage (Optional): 17.5mg/week
Date Dosage 1 Started: 01/24/2023
Date Dosage 2 Started: 04/21/2023
Calculate Cumulative Dosage Automatically?: Yes
Date Dosage Ended/Discontinued (Not Needed For Dosage Change): 05/23/2023
Comments: start 1/24/2023
Most Recent Cbc (Optional): 06/13/2023

## 2023-06-20 NOTE — PROCEDURE: CHRONOLOGY OF PRESENT ILLNESS
Chronology Of Present Illness: 8/8/2019:  Rash, located on the right ear and scalp. The rash is scaly, red, and itchy. The rash has been present for years. She has arthritis. She is not currently on any medications. She was started on Olux foam and ketoconazole cream.\\n11/17/2019: scalp is improved, ears are not. 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 She was started on hydrocortisone butuyrate and ketoconazole for the underarms. Biopsy obtained (eczematous dermatitis)\\n1/3/2020: Axillae have improved. scalp and ears as well. Continue current topicals and Pramosone lotion was added for the underarms.\\n2/5/2020: drastic improvement of axillae. She discontinued Hydrocortisone cream and is only using ketoconazole cream. scalp and ears are stable. Start Elidel cream to ears. \\n3/25/2020: scalp and ears not improved. Discussed Dupixent with patient. Consider after Cn19. Continue current topicals.\\n5/27/2020: scalp still flaring. Punch bx obtained of neck. (folliculitis, but not c/w clinical symptoms and presentation. Favor eczematous derm/AD. Continue current topicals. \\n8/4/2020: rash not well controlled on topicals. Start Dupixent enrollment. BSA 4, SEVERE. IGA score 4. Full biologic labs obtained. RX Valtrex given.\\n9/1/2020: DUPIXENT START TODAY. Recommend Systane eyedrops and Valtrex PRN as needed. \\n10/6/2020: Patient is somewhat improved, overall itching is better. Her ears are still very itchy. She is using Olux foam and ketoconazole cream to ears. She complains of new onset of joint pain above elbows and shoulders. She will see Dr. Dai and we will send letter.\\n11/3/2020: patient is improved overall. She still complains of itchy ears. Recommend talking to her audiologist to see if there are other hearing aids that are made of different material. RX Mimyx given for barrier cream. Continue Protopic 0.1% ointment QD PRN to ears and other topicals. Continue Dupixent injections. IGA score 1\\n12/9/2020: patient is much better. Ears are better. Hearing aid manufacturers told her that hearing aids are made out of medical grade hypoallergenic silicone. Itching has improved. Continue current topicals, antihistamines and Dupixent. Start Zonalon cream to ears for itching. Lab order given to patient today. Discussed that injection site reactions should subside.\\n3/17/21: eyes are very bothersome. Discussed possible component of rosacea. Rec warm rice sock compresses in addition to the Systane eye drops. For now continue Dupixent and topicals.\\n4/19/21: Eyes are still dry and bothersome. She states she will be seeing a dry eye specialist on Wednesday. She reports continued itching in the ears. Recommend not making too many changes to treatment regimen due to patient?s upcoming appt with dry eye specialist. Pramosone cream prescribed today for itching in ears, briefly discussed R/B/SE of gabapentin to consider starting at NOV if itching does not improve.\\n7/19/21: stable. Continue Dupixent Q 2 weeks and topicals. Recently diagnosed with gastroenteritis and possible sepsis. 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 Continue Dupixent and all topicals as needed.\\n1/19/22: patient is stable. Continue Dupixent and topicals PRN.\\n4/20/22: her itching (due to oak pollen) starts 3-4 days prior to next injection. Discussed Rinvoque with patient. Discussed it controls the itching better. Discussed risks, benefits and side effects with patient (increase in malignancies, cardio vascular events, blood clots, DVT - usually associated with prior underlying smoking history or other diseases). Discussed injecting Dupixent Q10 days vs Q14 days. Patient desires to continue Dupixent at shorter intervals. Consider adding on Tim inhibitor during environmental allergy season in the future.\\n5/18/22: Patient reports itching is minimally improved on modified Dupixent dosing (every 9-10 days). She reports worst areas are on the arms and inside ears. Re-discussed R/B/SE of Rinvoq in detail today. Patient reports worst 7/10 itch. Patient reports no personal hx of cancer or blood clots. She reports hx of migraines. Patient reports worsened eye dryness since starting Dupixent therapy. She is agreeable to start Rinvoq, insurance coverage check initiated today. Patient to obtain repeat full biologics labs, lab slip provided to patient to get drawn ASAP (MUST be fasting). She is due for her next Dupixent injection this Sunday. She has not been vaccinated for shingles, recommend getting shingles vaccine first ASAP, then inject Dupixent as scheduled on Sunday. F/u in 2 weeks. Sample saved for pt, plan to start therapy at NOV.\\n5/31/22: Patient received shingles vaccine right after LOV. Last Dupixent injection 5/22/22. Patient is agreeable to start Rinvoq today. Rinvoq PA denied, will submit appeal with SCORAD score (39). Symptoms are worsening, causing intense itchiness and sleeplessness. RINVOQ START TODAY. samples provided in office.\\n7/5/22: Patient reports itching was much better when taking Rinvoq. Decreased itch factor from 6-7, down to a 2-3. Patient reports light headedness, nausea, indigestion, twitching in her chest while taking Rinvoq. She stopped it a couple days ago due to symptoms. She is still recovering from the side effects. Recommend starting Opzelura at this point. Consider restarting Rinvoq every other day once symptoms have resolved. Discussed Vtama with patient as well if Opzelura is not improving her symptoms.\\n8/5/22: Pt reports itching has not improved since starting opzelura. Pt to restart dupixent with loading dose after todays OV, sample given to Pt (1 300mg SC injection, pt states she has 1 pen at home). Rx sent to [facility].\\n9/2/22: pt states doing a little better after restarting Dupixent. Minor dry eyes and using Vytone and Olux foam topically currently. Will give pt Vtama sample today. Discussed considering restarting Rinvoq in future at lower dose. Patient states it worked the best but she complained of N/V and mild palpitations.\\n11/10/22: unimproved. First week after injection is good, then she itches again. Using a variety of topicals. Recommend trying Dermeleve cream and Remedy or glove in a bottle. Samples given. She will try injecting every week (supported by samples) x 1 month.\\n1/4/23: worse again. 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 Discussed restarting  Rinvoq at lower dosing vs prednisone taper. She reports insomnia with it. Patient agreeable to prednisone taper. Discussed MTX with patient. Will consider at follow up. Ears very itchy.\\n1/24/23: pt did not tolerate prednisone well, says eczema is unchanged. Itching on ears and shoulders today. Discussed MTX today and test dosage. Discussed regulatory blood work every month while on MTX. Pt would like to start MTX, will send Rx today. Pt to start 1/26. Will discontinue dupixent for now. MTX START TODAY.\\n2/22/23: pt fatigued and foggy on MTX. worse on dose day, better throughout the week. Ears look normal on exam today. Consider inc dosage at f/u if not further improved\\n3/21/23: pt reports less foggy and fatigue on current regimen. Continue same dose of MTX for another month, plan to increase NOV pending labs WNL.\\n4/4/23: new rash armpits. C/w ACD/ICD/eczema. Treat with TAC 0.1%. AD overall getting better. Less itching. Continue MTX per current regimen. Today not evaluated. Work in for rash.\\n04/21/23: pt reports rash under armpits has resolved. States she noticed a new rash yesterday also under LT underarm. As of today we will be increasing MTX dosage to 17.5/ week. Pt to take 3 QAM and 4 QPM only on fridays. Pt reports improvement in itching but still remains moderate to severe (5 to 6). Labs reviewed in OV, lab slip provided to pt for next OV.\\n5/23/23: unimproved. Recommend Olux foam to scalp. Recommend increase MTX to 4 pills BID. Patient agreeable.\\n6/20/23: pt stable. She reports side effects including fogginess and headaches with increased MTX dosage. Recommended continuing on current dosage and observe for resolution of side effects. Discussed patch testing in the fall.
Detail Level: Zone

## 2023-06-28 NOTE — PROCEDURE: MIPS QUALITY
Female
Quality 431: Preventive Care And Screening: Unhealthy Alcohol Use - Screening: Patient screened for unhealthy alcohol use using a single question and scores less than 2 times per year
Quality 110: Preventive Care And Screening: Influenza Immunization: Influenza Immunization not Administered due to Patient Allergy.
Detail Level: Detailed
Quality 130: Documentation Of Current Medications In The Medical Record: Current Medications Documented
Quality 402: Tobacco Use And Help With Quitting Among Adolescents: Patient screened for tobacco and never smoked
Quality 226: Preventive Care And Screening: Tobacco Use: Screening And Cessation Intervention: Patient screened for tobacco use and is an ex/non-smoker
Quality 431: Preventive Care And Screening: Unhealthy Alcohol Use - Screening: Patient not identified as an unhealthy alcohol user when screened for unhealthy alcohol use using a systematic screening method

## 2023-07-18 ENCOUNTER — APPOINTMENT (RX ONLY)
Dept: URBAN - METROPOLITAN AREA CLINIC 125 | Facility: CLINIC | Age: 58
Setting detail: DERMATOLOGY
End: 2023-07-18

## 2023-07-18 ENCOUNTER — RX ONLY (OUTPATIENT)
Age: 58
Setting detail: RX ONLY
End: 2023-07-18

## 2023-07-18 DIAGNOSIS — Z79.899 OTHER LONG TERM (CURRENT) DRUG THERAPY: ICD-10-CM

## 2023-07-18 DIAGNOSIS — L20.89 OTHER ATOPIC DERMATITIS: ICD-10-CM

## 2023-07-18 DIAGNOSIS — L259 CONTACT DERMATITIS AND OTHER ECZEMA, UNSPECIFIED CAUSE: ICD-10-CM

## 2023-07-18 DIAGNOSIS — L82.0 INFLAMED SEBORRHEIC KERATOSIS: ICD-10-CM

## 2023-07-18 PROBLEM — L30.9 DERMATITIS, UNSPECIFIED: Status: ACTIVE | Noted: 2023-07-18

## 2023-07-18 PROCEDURE — ? HIGH RISK MEDICATION MONITORING

## 2023-07-18 PROCEDURE — ? OBSERVATION AND MEASURE

## 2023-07-18 PROCEDURE — ? ADDITIONAL NOTES

## 2023-07-18 PROCEDURE — ? ORDER TESTS

## 2023-07-18 PROCEDURE — ? TREATMENT REGIMEN

## 2023-07-18 PROCEDURE — ? LIQUID NITROGEN

## 2023-07-18 PROCEDURE — ? CHRONOLOGY OF PRESENT ILLNESS

## 2023-07-18 PROCEDURE — ? PRESCRIPTION MEDICATION MANAGEMENT

## 2023-07-18 PROCEDURE — ? COUNSELING

## 2023-07-18 PROCEDURE — 17110 DESTRUCTION B9 LES UP TO 14: CPT

## 2023-07-18 PROCEDURE — ? METHOTREXATE MONITORING

## 2023-07-18 PROCEDURE — 99214 OFFICE O/P EST MOD 30 MIN: CPT | Mod: 25

## 2023-07-18 RX ORDER — METHOTREXATE SODIUM 2.5 MG/1
TABLET ORAL
Qty: 32 | Refills: 0 | Status: ERX

## 2023-07-18 ASSESSMENT — LOCATION DETAILED DESCRIPTION DERM
LOCATION DETAILED: RIGHT CYMBA CONCHA
LOCATION DETAILED: LEFT RADIAL DORSAL HAND
LOCATION DETAILED: LEFT INFERIOR CRUS OF ANTIHELIX
LOCATION DETAILED: LEFT MEDIAL FRONTAL SCALP
LOCATION DETAILED: LEFT AXILLARY VAULT
LOCATION DETAILED: RIGHT VENTRAL PROXIMAL FOREARM
LOCATION DETAILED: LEFT VENTRAL PROXIMAL FOREARM
LOCATION DETAILED: LEFT NASAL SIDEWALL
LOCATION DETAILED: RIGHT AXILLARY VAULT
LOCATION DETAILED: RIGHT ULNAR DORSAL HAND

## 2023-07-18 ASSESSMENT — LOCATION SIMPLE DESCRIPTION DERM
LOCATION SIMPLE: LEFT SCALP
LOCATION SIMPLE: RIGHT AXILLARY VAULT
LOCATION SIMPLE: LEFT FOREARM
LOCATION SIMPLE: LEFT EAR
LOCATION SIMPLE: LEFT HAND
LOCATION SIMPLE: LEFT NOSE
LOCATION SIMPLE: RIGHT HAND
LOCATION SIMPLE: RIGHT EAR
LOCATION SIMPLE: RIGHT FOREARM
LOCATION SIMPLE: LEFT AXILLARY VAULT

## 2023-07-18 ASSESSMENT — LOCATION ZONE DERM
LOCATION ZONE: ARM
LOCATION ZONE: EAR
LOCATION ZONE: HAND
LOCATION ZONE: AXILLAE
LOCATION ZONE: SCALP
LOCATION ZONE: NOSE

## 2023-07-18 ASSESSMENT — BSA ECZEMA: % BODY COVERED IN ECZEMA: 1

## 2023-07-18 ASSESSMENT — SEVERITY ASSESSMENT 2020: SEVERITY 2020: MODERATE

## 2023-07-18 NOTE — PROCEDURE: TREATMENT REGIMEN
Detail Level: Generalized
Plan: Pt has standing order. Last labs 07/12/23 WNL. Plan to get blood work before NOV.

## 2023-07-18 NOTE — PROCEDURE: METHOTREXATE MONITORING
Add Additional Dosage: No
Dosage 4 (Mg/Week): 0
Dosage 2 (Mg/Week): 17.5
Detail Level: Zone
Dosage 1 (Mg/Week): 15
Current Dosage (Optional): 17.5mg/week
Date Dosage 1 Started: 01/24/2023
Date Dosage 2 Started: 04/21/2023
Calculate Cumulative Dosage Automatically?: Yes
Date Dosage Ended/Discontinued (Not Needed For Dosage Change): 05/23/2023
Comments: start 1/24/2023
Most Recent Cbc (Optional): 7/12/23

## 2023-07-18 NOTE — PROCEDURE: CHRONOLOGY OF PRESENT ILLNESS
Chronology Of Present Illness: 8/8/2019:  Rash, located on the right ear and scalp. The rash is scaly, red, and itchy. The rash has been present for years. She has arthritis. She is not currently on any medications. She was started on Olux foam and ketoconazole cream.\\n11/17/2019: scalp is improved, ears are not. 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 She was started on hydrocortisone butuyrate and ketoconazole for the underarms. Biopsy obtained (eczematous dermatitis)\\n1/3/2020: Axillae have improved. scalp and ears as well. Continue current topicals and Pramosone lotion was added for the underarms.\\n2/5/2020: drastic improvement of axillae. She discontinued Hydrocortisone cream and is only using ketoconazole cream. scalp and ears are stable. Start Elidel cream to ears. \\n3/25/2020: scalp and ears not improved. Discussed Dupixent with patient. Consider after Cn19. Continue current topicals.\\n5/27/2020: scalp still flaring. Punch bx obtained of neck. (folliculitis, but not c/w clinical symptoms and presentation. Favor eczematous derm/AD. Continue current topicals. \\n8/4/2020: rash not well controlled on topicals. Start Dupixent enrollment. BSA 4, SEVERE. IGA score 4. Full biologic labs obtained. RX Valtrex given.\\n9/1/2020: DUPIXENT START TODAY. Recommend Systane eyedrops and Valtrex PRN as needed. \\n10/6/2020: Patient is somewhat improved, overall itching is better. Her ears are still very itchy. She is using Olux foam and ketoconazole cream to ears. She complains of new onset of joint pain above elbows and shoulders. She will see Dr. Dai and we will send letter.\\n11/3/2020: patient is improved overall. She still complains of itchy ears. Recommend talking to her audiologist to see if there are other hearing aids that are made of different material. RX Mimyx given for barrier cream. Continue Protopic 0.1% ointment QD PRN to ears and other topicals. Continue Dupixent injections. IGA score 1\\n12/9/2020: patient is much better. Ears are better. Hearing aid manufacturers told her that hearing aids are made out of medical grade hypoallergenic silicone. Itching has improved. Continue current topicals, antihistamines and Dupixent. Start Zonalon cream to ears for itching. Lab order given to patient today. Discussed that injection site reactions should subside.\\n3/17/21: eyes are very bothersome. Discussed possible component of rosacea. Rec warm rice sock compresses in addition to the Systane eye drops. For now continue Dupixent and topicals.\\n4/19/21: Eyes are still dry and bothersome. She states she will be seeing a dry eye specialist on Wednesday. She reports continued itching in the ears. Recommend not making too many changes to treatment regimen due to patient?s upcoming appt with dry eye specialist. Pramosone cream prescribed today for itching in ears, briefly discussed R/B/SE of gabapentin to consider starting at NOV if itching does not improve.\\n7/19/21: stable. Continue Dupixent Q 2 weeks and topicals. Recently diagnosed with gastroenteritis and possible sepsis. 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 Continue Dupixent and all topicals as needed.\\n1/19/22: patient is stable. Continue Dupixent and topicals PRN.\\n4/20/22: her itching (due to oak pollen) starts 3-4 days prior to next injection. Discussed Rinvoque with patient. Discussed it controls the itching better. Discussed risks, benefits and side effects with patient (increase in malignancies, cardio vascular events, blood clots, DVT - usually associated with prior underlying smoking history or other diseases). Discussed injecting Dupixent Q10 days vs Q14 days. Patient desires to continue Dupixent at shorter intervals. Consider adding on Tim inhibitor during environmental allergy season in the future.\\n5/18/22: Patient reports itching is minimally improved on modified Dupixent dosing (every 9-10 days). She reports worst areas are on the arms and inside ears. Re-discussed R/B/SE of Rinvoq in detail today. Patient reports worst 7/10 itch. Patient reports no personal hx of cancer or blood clots. She reports hx of migraines. Patient reports worsened eye dryness since starting Dupixent therapy. She is agreeable to start Rinvoq, insurance coverage check initiated today. Patient to obtain repeat full biologics labs, lab slip provided to patient to get drawn ASAP (MUST be fasting). She is due for her next Dupixent injection this Sunday. She has not been vaccinated for shingles, recommend getting shingles vaccine first ASAP, then inject Dupixent as scheduled on Sunday. F/u in 2 weeks. Sample saved for pt, plan to start therapy at NOV.\\n5/31/22: Patient received shingles vaccine right after LOV. Last Dupixent injection 5/22/22. Patient is agreeable to start Rinvoq today. Rinvoq PA denied, will submit appeal with SCORAD score (39). Symptoms are worsening, causing intense itchiness and sleeplessness. RINVOQ START TODAY. samples provided in office.\\n7/5/22: Patient reports itching was much better when taking Rinvoq. Decreased itch factor from 6-7, down to a 2-3. Patient reports light headedness, nausea, indigestion, twitching in her chest while taking Rinvoq. She stopped it a couple days ago due to symptoms. She is still recovering from the side effects. Recommend starting Opzelura at this point. Consider restarting Rinvoq every other day once symptoms have resolved. Discussed Vtama with patient as well if Opzelura is not improving her symptoms.\\n8/5/22: Pt reports itching has not improved since starting opzelura. Pt to restart dupixent with loading dose after todays OV, sample given to Pt (1 300mg SC injection, pt states she has 1 pen at home). Rx sent to [facility].\\n9/2/22: pt states doing a little better after restarting Dupixent. Minor dry eyes and using Vytone and Olux foam topically currently. Will give pt Vtama sample today. Discussed considering restarting Rinvoq in future at lower dose. Patient states it worked the best but she complained of N/V and mild palpitations.\\n11/10/22: unimproved. First week after injection is good, then she itches again. Using a variety of topicals. Recommend trying Dermeleve cream and Remedy or glove in a bottle. Samples given. She will try injecting every week (supported by samples) x 1 month.\\n1/4/23: worse again. 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 Discussed restarting  Rinvoq at lower dosing vs prednisone taper. She reports insomnia with it. Patient agreeable to prednisone taper. Discussed MTX with patient. Will consider at follow up. Ears very itchy.\\n1/24/23: pt did not tolerate prednisone well, says eczema is unchanged. Itching on ears and shoulders today. Discussed MTX today and test dosage. Discussed regulatory blood work every month while on MTX. Pt would like to start MTX, will send Rx today. Pt to start 1/26. Will discontinue dupixent for now. MTX START TODAY.\\n2/22/23: pt fatigued and foggy on MTX. worse on dose day, better throughout the week. Ears look normal on exam today. Consider inc dosage at f/u if not further improved\\n3/21/23: pt reports less foggy and fatigue on current regimen. Continue same dose of MTX for another month, plan to increase NOV pending labs WNL.\\n4/4/23: new rash armpits. C/w ACD/ICD/eczema. Treat with TAC 0.1%. AD overall getting better. Less itching. Continue MTX per current regimen. Today not evaluated. Work in for rash.\\n04/21/23: pt reports rash under armpits has resolved. States she noticed a new rash yesterday also under LT underarm. As of today we will be increasing MTX dosage to 17.5/ week. Pt to take 3 QAM and 4 QPM only on fridays. Pt reports improvement in itching but still remains moderate to severe (5 to 6). Labs reviewed in OV, lab slip provided to pt for next OV.\\n5/23/23: unimproved. Recommend Olux foam to scalp. Recommend increase MTX to 4 pills BID. Patient agreeable.\\n6/20/23: pt stable. She reports side effects including fogginess and headaches with increased MTX dosage. Recommended continuing on current dosage and observe for resolution of side effects. Discussed patch testing in the fall.\\n07/19/23: pt reports stable. Still feeling nauseous and foggy but tolerable. Discussed continuing current regimen, pt agreeable.
Detail Level: Zone

## 2023-07-18 NOTE — PROCEDURE: LIQUID NITROGEN
Show Topical Anesthesia Variable?: Yes
Detail Level: Detailed
Include Z78.9 (Other Specified Conditions Influencing Health Status) As An Associated Diagnosis?: No
Post-Care Instructions: I reviewed with the patient in detail post-care instructions. Patient is to wear sunprotection, and avoid picking at any of the treated lesions. Patient may apply Vaseline to crusted or scabbed areas.
Spray Paint Text: The liquid nitrogen was applied to the skin utilizing a spray paint frosting technique.
Number Of Freeze-Thaw Cycles: 1 freeze-thaw cycle
Consent: The patient's consent was obtained including but not limited to risks of crusting, scabbing, blistering, scarring, darker or lighter pigmentary change, recurrence, incomplete removal and infection.
Medical Necessity Clause: This procedure was medically necessary because the lesions that were treated were intensely:
Medical Necessity Information: It is in your best interest to select a reason for this procedure from the list below. All of these items fulfill various CMS LCD requirements except the new and changing color options.

## 2023-08-22 ENCOUNTER — APPOINTMENT (RX ONLY)
Dept: URBAN - METROPOLITAN AREA CLINIC 125 | Facility: CLINIC | Age: 58
Setting detail: DERMATOLOGY
End: 2023-08-22

## 2023-08-22 DIAGNOSIS — Z79.899 OTHER LONG TERM (CURRENT) DRUG THERAPY: ICD-10-CM

## 2023-08-22 DIAGNOSIS — L20.89 OTHER ATOPIC DERMATITIS: ICD-10-CM

## 2023-08-22 DIAGNOSIS — L259 CONTACT DERMATITIS AND OTHER ECZEMA, UNSPECIFIED CAUSE: ICD-10-CM

## 2023-08-22 DIAGNOSIS — L82.0 INFLAMED SEBORRHEIC KERATOSIS: ICD-10-CM

## 2023-08-22 PROBLEM — L30.9 DERMATITIS, UNSPECIFIED: Status: ACTIVE | Noted: 2023-08-22

## 2023-08-22 PROCEDURE — ? COUNSELING

## 2023-08-22 PROCEDURE — ? ORDER TESTS

## 2023-08-22 PROCEDURE — ? PRESCRIPTION MEDICATION MANAGEMENT

## 2023-08-22 PROCEDURE — 17110 DESTRUCTION B9 LES UP TO 14: CPT

## 2023-08-22 PROCEDURE — ? METHOTREXATE MONITORING

## 2023-08-22 PROCEDURE — ? ADDITIONAL NOTES

## 2023-08-22 PROCEDURE — ? LIQUID NITROGEN

## 2023-08-22 PROCEDURE — ? HIGH RISK MEDICATION MONITORING

## 2023-08-22 PROCEDURE — ? TREATMENT REGIMEN

## 2023-08-22 PROCEDURE — 99214 OFFICE O/P EST MOD 30 MIN: CPT | Mod: 25

## 2023-08-22 PROCEDURE — ? CHRONOLOGY OF PRESENT ILLNESS

## 2023-08-22 PROCEDURE — ? OBSERVATION AND MEASURE

## 2023-08-22 ASSESSMENT — LOCATION DETAILED DESCRIPTION DERM
LOCATION DETAILED: RIGHT ULNAR DORSAL HAND
LOCATION DETAILED: LEFT INFERIOR CRUS OF ANTIHELIX
LOCATION DETAILED: LEFT NASAL SIDEWALL
LOCATION DETAILED: RIGHT AXILLARY VAULT
LOCATION DETAILED: LEFT MEDIAL FRONTAL SCALP
LOCATION DETAILED: LEFT RADIAL DORSAL HAND
LOCATION DETAILED: RIGHT CYMBA CONCHA
LOCATION DETAILED: LEFT VENTRAL PROXIMAL FOREARM
LOCATION DETAILED: LEFT AXILLARY VAULT
LOCATION DETAILED: RIGHT VENTRAL PROXIMAL FOREARM

## 2023-08-22 ASSESSMENT — BSA ECZEMA: % BODY COVERED IN ECZEMA: 1

## 2023-08-22 ASSESSMENT — LOCATION SIMPLE DESCRIPTION DERM
LOCATION SIMPLE: LEFT SCALP
LOCATION SIMPLE: LEFT HAND
LOCATION SIMPLE: RIGHT AXILLARY VAULT
LOCATION SIMPLE: RIGHT HAND
LOCATION SIMPLE: RIGHT EAR
LOCATION SIMPLE: RIGHT FOREARM
LOCATION SIMPLE: LEFT AXILLARY VAULT
LOCATION SIMPLE: LEFT NOSE
LOCATION SIMPLE: LEFT FOREARM
LOCATION SIMPLE: LEFT EAR

## 2023-08-22 ASSESSMENT — LOCATION ZONE DERM
LOCATION ZONE: NOSE
LOCATION ZONE: AXILLAE
LOCATION ZONE: ARM
LOCATION ZONE: SCALP
LOCATION ZONE: HAND
LOCATION ZONE: EAR

## 2023-08-22 ASSESSMENT — SEVERITY ASSESSMENT 2020: SEVERITY 2020: MODERATE

## 2023-08-22 NOTE — PROCEDURE: CHRONOLOGY OF PRESENT ILLNESS
Chronology Of Present Illness: 8/8/2019:  Rash, located on the right ear and scalp. The rash is scaly, red, and itchy. The rash has been present for years. She has arthritis. She is not currently on any medications. She was started on Olux foam and ketoconazole cream.\\n11/17/2019: scalp is improved, ears are not. 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 She was started on hydrocortisone butuyrate and ketoconazole for the underarms. Biopsy obtained (eczematous dermatitis)\\n1/3/2020: Axillae have improved. scalp and ears as well. Continue current topicals and Pramosone lotion was added for the underarms.\\n2/5/2020: drastic improvement of axillae. She discontinued Hydrocortisone cream and is only using ketoconazole cream. scalp and ears are stable. Start Elidel cream to ears. \\n3/25/2020: scalp and ears not improved. Discussed Dupixent with patient. Consider after Cn19. Continue current topicals.\\n5/27/2020: scalp still flaring. Punch bx obtained of neck. (folliculitis, but not c/w clinical symptoms and presentation. Favor eczematous derm/AD. Continue current topicals. \\n8/4/2020: rash not well controlled on topicals. Start Dupixent enrollment. BSA 4, SEVERE. IGA score 4. Full biologic labs obtained. RX Valtrex given.\\n9/1/2020: DUPIXENT START TODAY. Recommend Systane eyedrops and Valtrex PRN as needed. \\n10/6/2020: Patient is somewhat improved, overall itching is better. Her ears are still very itchy. She is using Olux foam and ketoconazole cream to ears. She complains of new onset of joint pain above elbows and shoulders. She will see Dr. Dai and we will send letter.\\n11/3/2020: patient is improved overall. She still complains of itchy ears. Recommend talking to her audiologist to see if there are other hearing aids that are made of different material. RX Mimyx given for barrier cream. Continue Protopic 0.1% ointment QD PRN to ears and other topicals. Continue Dupixent injections. IGA score 1\\n12/9/2020: patient is much better. Ears are better. Hearing aid manufacturers told her that hearing aids are made out of medical grade hypoallergenic silicone. Itching has improved. Continue current topicals, antihistamines and Dupixent. Start Zonalon cream to ears for itching. Lab order given to patient today. Discussed that injection site reactions should subside.\\n3/17/21: eyes are very bothersome. Discussed possible component of rosacea. Rec warm rice sock compresses in addition to the Systane eye drops. For now continue Dupixent and topicals.\\n4/19/21: Eyes are still dry and bothersome. She states she will be seeing a dry eye specialist on Wednesday. She reports continued itching in the ears. Recommend not making too many changes to treatment regimen due to patient?s upcoming appt with dry eye specialist. Pramosone cream prescribed today for itching in ears, briefly discussed R/B/SE of gabapentin to consider starting at NOV if itching does not improve.\\n7/19/21: stable. Continue Dupixent Q 2 weeks and topicals. Recently diagnosed with gastroenteritis and possible sepsis. 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 Continue Dupixent and all topicals as needed.\\n1/19/22: patient is stable. Continue Dupixent and topicals PRN.\\n4/20/22: her itching (due to oak pollen) starts 3-4 days prior to next injection. Discussed Rinvoque with patient. Discussed it controls the itching better. Discussed risks, benefits and side effects with patient (increase in malignancies, cardio vascular events, blood clots, DVT - usually associated with prior underlying smoking history or other diseases). Discussed injecting Dupixent Q10 days vs Q14 days. Patient desires to continue Dupixent at shorter intervals. Consider adding on Tim inhibitor during environmental allergy season in the future.\\n5/18/22: Patient reports itching is minimally improved on modified Dupixent dosing (every 9-10 days). She reports worst areas are on the arms and inside ears. Re-discussed R/B/SE of Rinvoq in detail today. Patient reports worst 7/10 itch. Patient reports no personal hx of cancer or blood clots. She reports hx of migraines. Patient reports worsened eye dryness since starting Dupixent therapy. She is agreeable to start Rinvoq, insurance coverage check initiated today. Patient to obtain repeat full biologics labs, lab slip provided to patient to get drawn ASAP (MUST be fasting). She is due for her next Dupixent injection this Sunday. She has not been vaccinated for shingles, recommend getting shingles vaccine first ASAP, then inject Dupixent as scheduled on Sunday. F/u in 2 weeks. Sample saved for pt, plan to start therapy at NOV.\\n5/31/22: Patient received shingles vaccine right after LOV. Last Dupixent injection 5/22/22. Patient is agreeable to start Rinvoq today. Rinvoq PA denied, will submit appeal with SCORAD score (39). Symptoms are worsening, causing intense itchiness and sleeplessness. RINVOQ START TODAY. samples provided in office.\\n7/5/22: Patient reports itching was much better when taking Rinvoq. Decreased itch factor from 6-7, down to a 2-3. Patient reports light headedness, nausea, indigestion, twitching in her chest while taking Rinvoq. She stopped it a couple days ago due to symptoms. She is still recovering from the side effects. Recommend starting Opzelura at this point. Consider restarting Rinvoq every other day once symptoms have resolved. Discussed Vtama with patient as well if Opzelura is not improving her symptoms.\\n8/5/22: Pt reports itching has not improved since starting opzelura. Pt to restart dupixent with loading dose after todays OV, sample given to Pt (1 300mg SC injection, pt states she has 1 pen at home). Rx sent to [facility].\\n9/2/22: pt states doing a little better after restarting Dupixent. Minor dry eyes and using Vytone and Olux foam topically currently. Will give pt Vtama sample today. Discussed considering restarting Rinvoq in future at lower dose. Patient states it worked the best but she complained of N/V and mild palpitations.\\n11/10/22: unimproved. First week after injection is good, then she itches again. Using a variety of topicals. Recommend trying Dermeleve cream and Remedy or glove in a bottle. Samples given. She will try injecting every week (supported by samples) x 1 month.\\n1/4/23: worse again. 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 Discussed restarting  Rinvoq at lower dosing vs prednisone taper. She reports insomnia with it. Patient agreeable to prednisone taper. Discussed MTX with patient. Will consider at follow up. Ears very itchy.\\n1/24/23: pt did not tolerate prednisone well, says eczema is unchanged. Itching on ears and shoulders today. Discussed MTX today and test dosage. Discussed regulatory blood work every month while on MTX. Pt would like to start MTX, will send Rx today. Pt to start 1/26. Will discontinue dupixent for now. MTX START TODAY.\\n2/22/23: pt fatigued and foggy on MTX. worse on dose day, better throughout the week. Ears look normal on exam today. Consider inc dosage at f/u if not further improved\\n3/21/23: pt reports less foggy and fatigue on current regimen. Continue same dose of MTX for another month, plan to increase NOV pending labs WNL.\\n4/4/23: new rash armpits. C/w ACD/ICD/eczema. Treat with TAC 0.1%. AD overall getting better. Less itching. Continue MTX per current regimen. Today not evaluated. Work in for rash.\\n04/21/23: pt reports rash under armpits has resolved. States she noticed a new rash yesterday also under LT underarm. As of today we will be increasing MTX dosage to 17.5/ week. Pt to take 3 QAM and 4 QPM only on fridays. Pt reports improvement in itching but still remains moderate to severe (5 to 6). Labs reviewed in OV, lab slip provided to pt for next OV.\\n5/23/23: unimproved. Recommend Olux foam to scalp. Recommend increase MTX to 4 pills BID. Patient agreeable.\\n6/20/23: pt stable. She reports side effects including fogginess and headaches with increased MTX dosage. Recommended continuing on current dosage and observe for resolution of side effects. Discussed patch testing in the fall.\\n07/19/23: pt reports stable. Still feeling nauseous and foggy but tolerable. Discussed continuing current regimen, pt agreeable.\\n8/22/23: Pt reports stable most days with itching mostly under control. Discussed continuing current regimen and Pt agreeable.
Detail Level: Zone

## 2023-09-19 ENCOUNTER — RX ONLY (OUTPATIENT)
Age: 58
Setting detail: RX ONLY
End: 2023-09-19

## 2023-09-19 RX ORDER — METHOTREXATE SODIUM 2.5 MG/1
TABLET ORAL
Qty: 32 | Refills: 0 | Status: ERX

## 2023-10-24 ENCOUNTER — APPOINTMENT (RX ONLY)
Dept: URBAN - METROPOLITAN AREA CLINIC 125 | Facility: CLINIC | Age: 58
Setting detail: DERMATOLOGY
End: 2023-10-24

## 2023-10-24 DIAGNOSIS — L20.89 OTHER ATOPIC DERMATITIS: ICD-10-CM | Status: INADEQUATELY CONTROLLED

## 2023-10-24 DIAGNOSIS — Z79.899 OTHER LONG TERM (CURRENT) DRUG THERAPY: ICD-10-CM

## 2023-10-24 DIAGNOSIS — L82.0 INFLAMED SEBORRHEIC KERATOSIS: ICD-10-CM | Status: INADEQUATELY CONTROLLED

## 2023-10-24 PROCEDURE — ? HIGH RISK MEDICATION MONITORING

## 2023-10-24 PROCEDURE — ? ADDITIONAL NOTES

## 2023-10-24 PROCEDURE — 99214 OFFICE O/P EST MOD 30 MIN: CPT

## 2023-10-24 PROCEDURE — ? TREATMENT REGIMEN

## 2023-10-24 PROCEDURE — ? METHOTREXATE MONITORING

## 2023-10-24 PROCEDURE — ? PRESCRIPTION

## 2023-10-24 PROCEDURE — ? CHRONOLOGY OF PRESENT ILLNESS

## 2023-10-24 PROCEDURE — ? PRESCRIPTION MEDICATION MANAGEMENT

## 2023-10-24 PROCEDURE — ? COUNSELING

## 2023-10-24 PROCEDURE — ? ORDER TESTS

## 2023-10-24 RX ORDER — GABAPENTIN 300 MG/1
CAPSULE ORAL
Qty: 90 | Refills: 1 | Status: ERX | COMMUNITY
Start: 2023-10-24

## 2023-10-24 RX ADMIN — GABAPENTIN: 300 CAPSULE ORAL at 00:00

## 2023-10-24 ASSESSMENT — LOCATION SIMPLE DESCRIPTION DERM
LOCATION SIMPLE: LEFT HAND
LOCATION SIMPLE: LEFT SCALP
LOCATION SIMPLE: RIGHT EAR
LOCATION SIMPLE: LEFT EAR
LOCATION SIMPLE: LEFT NOSE
LOCATION SIMPLE: RIGHT FOREARM
LOCATION SIMPLE: LEFT FOREARM
LOCATION SIMPLE: RIGHT HAND
LOCATION SIMPLE: LEFT AXILLARY VAULT

## 2023-10-24 ASSESSMENT — LOCATION DETAILED DESCRIPTION DERM
LOCATION DETAILED: LEFT INFERIOR CRUS OF ANTIHELIX
LOCATION DETAILED: RIGHT VENTRAL PROXIMAL FOREARM
LOCATION DETAILED: RIGHT CYMBA CONCHA
LOCATION DETAILED: RIGHT ULNAR DORSAL HAND
LOCATION DETAILED: LEFT NASAL SIDEWALL
LOCATION DETAILED: LEFT VENTRAL PROXIMAL FOREARM
LOCATION DETAILED: LEFT AXILLARY VAULT
LOCATION DETAILED: LEFT RADIAL DORSAL HAND
LOCATION DETAILED: LEFT MEDIAL FRONTAL SCALP

## 2023-10-24 ASSESSMENT — LOCATION ZONE DERM
LOCATION ZONE: AXILLAE
LOCATION ZONE: HAND
LOCATION ZONE: NOSE
LOCATION ZONE: ARM
LOCATION ZONE: EAR
LOCATION ZONE: SCALP

## 2023-10-24 ASSESSMENT — BSA ECZEMA: % BODY COVERED IN ECZEMA: 1

## 2023-10-24 ASSESSMENT — SEVERITY ASSESSMENT 2020: SEVERITY 2020: MODERATE

## 2023-10-24 NOTE — PROCEDURE: METHOTREXATE MONITORING
Add Additional Dosage: No
Dosage 4 (Mg/Week): 0
Dosage 2 (Mg/Week): 17.5
Dosage 3 (Mg/Week): 20
Detail Level: Zone
Dosage 1 (Mg/Week): 15
Date Dosage 3 Started: 05/23/2023
Current Dosage (Optional): 17.5mg/week
Date Dosage 1 Started: 01/24/2023
Date Dosage 2 Started: 04/21/2023
Calculate Cumulative Dosage Automatically?: Yes
Comments: start 1/24/2023
Most Recent Cbc (Optional): 7/12/23

## 2023-10-24 NOTE — PROCEDURE: CHRONOLOGY OF PRESENT ILLNESS
Chronology Of Present Illness: 8/8/2019:  Rash, located on the right ear and scalp. The rash is scaly, red, and itchy. The rash has been present for years. She has arthritis. She is not currently on any medications. She was started on Olux foam and ketoconazole cream.\\n11/17/2019: scalp is improved, ears are not. 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 She was started on hydrocortisone butuyrate and ketoconazole for the underarms. Biopsy obtained (eczematous dermatitis)\\n1/3/2020: Axillae have improved. scalp and ears as well. Continue current topicals and Pramosone lotion was added for the underarms.\\n2/5/2020: drastic improvement of axillae. She discontinued Hydrocortisone cream and is only using ketoconazole cream. scalp and ears are stable. Start Elidel cream to ears. \\n3/25/2020: scalp and ears not improved. Discussed Dupixent with patient. Consider after Cn19. Continue current topicals.\\n5/27/2020: scalp still flaring. Punch bx obtained of neck. (folliculitis, but not c/w clinical symptoms and presentation. Favor eczematous derm/AD. Continue current topicals. \\n8/4/2020: rash not well controlled on topicals. Start Dupixent enrollment. BSA 4, SEVERE. IGA score 4. Full biologic labs obtained. RX Valtrex given.\\n9/1/2020: DUPIXENT START TODAY. Recommend Systane eyedrops and Valtrex PRN as needed. \\n10/6/2020: Patient is somewhat improved, overall itching is better. Her ears are still very itchy. She is using Olux foam and ketoconazole cream to ears. She complains of new onset of joint pain above elbows and shoulders. She will see Dr. Dai and we will send letter.\\n11/3/2020: patient is improved overall. She still complains of itchy ears. Recommend talking to her audiologist to see if there are other hearing aids that are made of different material. RX Mimyx given for barrier cream. Continue Protopic 0.1% ointment QD PRN to ears and other topicals. Continue Dupixent injections. IGA score 1\\n12/9/2020: patient is much better. Ears are better. Hearing aid manufacturers told her that hearing aids are made out of medical grade hypoallergenic silicone. Itching has improved. Continue current topicals, antihistamines and Dupixent. Start Zonalon cream to ears for itching. Lab order given to patient today. Discussed that injection site reactions should subside.\\n3/17/21: eyes are very bothersome. Discussed possible component of rosacea. Rec warm rice sock compresses in addition to the Systane eye drops. For now continue Dupixent and topicals.\\n4/19/21: Eyes are still dry and bothersome. She states she will be seeing a dry eye specialist on Wednesday. She reports continued itching in the ears. Recommend not making too many changes to treatment regimen due to patient?s upcoming appt with dry eye specialist. Pramosone cream prescribed today for itching in ears, briefly discussed R/B/SE of gabapentin to consider starting at NOV if itching does not improve.\\n7/19/21: stable. Continue Dupixent Q 2 weeks and topicals. Recently diagnosed with gastroenteritis and possible sepsis. 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 Continue Dupixent and all topicals as needed.\\n1/19/22: patient is stable. Continue Dupixent and topicals PRN.\\n4/20/22: her itching (due to oak pollen) starts 3-4 days prior to next injection. Discussed Rinvoq with patient. Discussed it controls the itching better. Discussed risks, benefits and side effects with patient (increase in malignancies, cardio vascular events, blood clots, DVT - usually associated with prior underlying smoking history or other diseases). Discussed injecting Dupixent Q10 days vs Q14 days. Patient desires to continue Dupixent at shorter intervals. Consider adding on Tim inhibitor during environmental allergy season in the future.\\n5/18/22: Patient reports itching is minimally improved on modified Dupixent dosing (every 9-10 days). She reports worst areas are on the arms and inside ears. Re-discussed R/B/SE of Rinvoq in detail today. Patient reports worst 7/10 itch. Patient reports no personal hx of cancer or blood clots. She reports hx of migraines. Patient reports worsened eye dryness since starting Dupixent therapy. She is agreeable to start Rinvoq, insurance coverage check initiated today. Patient to obtain repeat full biologics labs, lab slip provided to patient to get drawn ASAP (MUST be fasting). She is due for her next Dupixent injection this Sunday. She has not been vaccinated for shingles, recommend getting shingles vaccine first ASAP, then inject Dupixent as scheduled on Sunday. F/u in 2 weeks. Sample saved for pt, plan to start therapy at NOV.\\n5/31/22: Patient received shingles vaccine right after LOV. Last Dupixent injection 5/22/22. Patient is agreeable to start Rinvoq today. Rinvoq PA denied, will submit appeal with SCORAD score (39). Symptoms are worsening, causing intense itchiness and sleeplessness. RINVOQ START TODAY. samples provided in office.\\n7/5/22: Patient reports itching was much better when taking Rinvoq. Decreased itch factor from 6-7, down to a 2-3. Patient reports light headedness, nausea, indigestion, twitching in her chest while taking Rinvoq. RINVOQ D/C. She stopped it a couple days ago due to symptoms. She is still recovering from the side effects. Recommend starting Opzelura at this point. Consider restarting Rinvoq every other day once symptoms have resolved. Discussed Vtama with patient as well if Opzelura is not improving her symptoms.\\n8/5/22: Pt reports itching has not improved since starting opzelura. Pt to RESTART DUPIXENT TODAY with loading dose after todays OV, sample given to Pt (1 300mg SC injection, pt states she has 1 pen at home). Rx sent to [email or web address].\\n9/2/22: pt states doing a little better after restarting Dupixent. Minor dry eyes and using Vytone and Olux foam topically currently. Will give pt Vtama sample today. Discussed considering restarting Rinvoq in future at lower dose. Patient states it worked the best but she complained of N/V and mild palpitations.\\n11/10/22: unimproved. First week after injection is good, then she itches again. Using a variety of topicals. Recommend trying Dermeleve cream and Remedy or glove in a bottle. Samples given. She will try injecting every week (supported by samples) x 1 month.\\n1/4/23: worse again. 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 Discussed restarting  Rinvoq at lower dosing vs prednisone taper. She reports insomnia with it. Patient agreeable to prednisone taper. Discussed MTX with patient. Will consider at follow up. Ears very itchy.\\n1/24/23: pt did not tolerate prednisone well, says eczema is unchanged. Itching on ears and shoulders today. Discussed MTX today and test dosage. Discussed regulatory blood work every month while on MTX. Pt would like to start MTX, will send Rx today. Pt to start 1/26. Will discontinue dupixent for now. MTX START TODAY.\\n2/22/23: pt fatigued and foggy on MTX. worse on dose day, better throughout the week. Ears look normal on exam today. Consider inc dosage at f/u if not further improved\\n3/21/23: pt reports less foggy and fatigue on current regimen. Continue same dose of MTX for another month, plan to increase NOV pending labs WNL.\\n4/4/23: new rash armpits. C/w ACD/ICD/eczema. Treat with TAC 0.1%. AD overall getting better. Less itching. Continue MTX per current regimen. Today not evaluated. Work in for rash.\\n04/21/23: pt reports rash under armpits has resolved. States she noticed a new rash yesterday also under LT underarm. As of today we will be increasing MTX dosage to 17.5/ week. Pt to take 3 QAM and 4 QPM only on fridays. Pt reports improvement in itching but still remains moderate to severe (5 to 6). Labs reviewed in OV, lab slip provided to pt for next OV.\\n5/23/23: unimproved. Recommend Olux foam to scalp. Recommend increase MTX to 4 pills BID. Patient agreeable.\\n6/20/23: pt stable. She reports side effects including fogginess and headaches with increased MTX dosage. Recommended continuing on current dosage and observe for resolution of side effects. Discussed patch testing in the fall.\\n07/19/23: pt reports stable. Still feeling nauseous and foggy but tolerable. Discussed continuing current regimen, pt agreeable.\\n8/22/23: Pt reports stable most days with itching mostly under control. Discussed continuing current regimen and Pt agreeable.\\n10/24/23: inadequately controlled. Discussed other tx options. Consider Gabapentin. Patient agreeable to START GABAPENTIN TODAY. Rx given. Continue methotrexate sodium 2.5 mg tablet: Take 4 tablets PO BID every Friday. Re check labs in 2 months.
Detail Level: Zone

## 2023-10-24 NOTE — PROCEDURE: TREATMENT REGIMEN
Detail Level: Generalized
Plan: Pt has standing order. Last labs 10/17/23 WNL. Plan to get blood work in 2 months.

## 2023-10-24 NOTE — PROCEDURE: PRESCRIPTION MEDICATION MANAGEMENT
Render In Strict Bullet Format?: No
Detail Level: Zone
Initiate Treatment: gabapentin 300 mg capsule: Take one capsule PO 1 h before bedtime x 1 day, then take one capsule PO BID x 1 day, then take one capsule PO TID for maintenance
Continue Regimen: methotrexate sodium 2.5 mg tablet: Take 4 tablets PO BID every Friday\\nfolic acid 1 mg tablet: Take 1 PO QD\\nOlux 0.05% foam BID PRN flares (ears)\\nKetoconazole 2% shampoo [id number]\\nKetoconazole 2% cream BID to ears

## 2023-11-17 ENCOUNTER — RX ONLY (OUTPATIENT)
Age: 58
Setting detail: RX ONLY
End: 2023-11-17

## 2023-11-17 RX ORDER — METHOTREXATE SODIUM 2.5 MG/1
TABLET ORAL
Qty: 8 | Refills: 0 | Status: ERX

## 2023-11-27 ENCOUNTER — RX ONLY (OUTPATIENT)
Age: 58
Setting detail: RX ONLY
End: 2023-11-27

## 2023-11-27 ENCOUNTER — APPOINTMENT (RX ONLY)
Dept: URBAN - METROPOLITAN AREA CLINIC 125 | Facility: CLINIC | Age: 58
Setting detail: DERMATOLOGY
End: 2023-11-27

## 2023-11-27 DIAGNOSIS — L20.89 OTHER ATOPIC DERMATITIS: ICD-10-CM

## 2023-11-27 DIAGNOSIS — Z79.899 OTHER LONG TERM (CURRENT) DRUG THERAPY: ICD-10-CM

## 2023-11-27 PROCEDURE — ? ORDER TESTS

## 2023-11-27 PROCEDURE — ? HIGH RISK MEDICATION MONITORING

## 2023-11-27 PROCEDURE — ? METHOTREXATE MONITORING

## 2023-11-27 PROCEDURE — ? CHRONOLOGY OF PRESENT ILLNESS

## 2023-11-27 PROCEDURE — ? COUNSELING

## 2023-11-27 PROCEDURE — ? TREATMENT REGIMEN

## 2023-11-27 PROCEDURE — 99214 OFFICE O/P EST MOD 30 MIN: CPT

## 2023-11-27 PROCEDURE — ? ADDITIONAL NOTES

## 2023-11-27 PROCEDURE — ? PRESCRIPTION MEDICATION MANAGEMENT

## 2023-11-27 RX ORDER — METHOTREXATE SODIUM 2.5 MG/1
TABLET ORAL
Qty: 32 | Refills: 0 | Status: ERX

## 2023-11-27 ASSESSMENT — LOCATION SIMPLE DESCRIPTION DERM
LOCATION SIMPLE: LEFT AXILLARY VAULT
LOCATION SIMPLE: LEFT EAR
LOCATION SIMPLE: LEFT HAND
LOCATION SIMPLE: LEFT FOREARM
LOCATION SIMPLE: RIGHT HAND
LOCATION SIMPLE: RIGHT FOREARM
LOCATION SIMPLE: RIGHT EAR
LOCATION SIMPLE: LEFT SCALP

## 2023-11-27 ASSESSMENT — LOCATION DETAILED DESCRIPTION DERM
LOCATION DETAILED: LEFT VENTRAL PROXIMAL FOREARM
LOCATION DETAILED: LEFT RADIAL DORSAL HAND
LOCATION DETAILED: RIGHT ULNAR DORSAL HAND
LOCATION DETAILED: LEFT INFERIOR CRUS OF ANTIHELIX
LOCATION DETAILED: LEFT AXILLARY VAULT
LOCATION DETAILED: RIGHT CYMBA CONCHA
LOCATION DETAILED: LEFT MEDIAL FRONTAL SCALP
LOCATION DETAILED: RIGHT VENTRAL PROXIMAL FOREARM

## 2023-11-27 ASSESSMENT — SEVERITY ASSESSMENT 2020: SEVERITY 2020: MILD

## 2023-11-27 ASSESSMENT — LOCATION ZONE DERM
LOCATION ZONE: AXILLAE
LOCATION ZONE: HAND
LOCATION ZONE: EAR
LOCATION ZONE: SCALP
LOCATION ZONE: ARM

## 2023-11-27 ASSESSMENT — BSA ECZEMA: % BODY COVERED IN ECZEMA: 1

## 2023-11-27 NOTE — PROCEDURE: ORDER TESTS
Bilobed Flap Text: The defect edges were debeveled with a #15 scalpel blade.  Given the location of the defect and the proximity to free margins a bilobe flap was deemed most appropriate.  Using a sterile surgical marker, an appropriate bilobe flap drawn around the defect.    The area thus outlined was incised deep to adipose tissue with a #15 scalpel blade.  The skin margins were undermined to an appropriate distance in all directions utilizing iris scissors.
Bill For Surgical Tray: no
Billing Type: Third-Party Bill
Expected Date Of Service: 04/21/2023

## 2023-11-27 NOTE — PROCEDURE: PRESCRIPTION MEDICATION MANAGEMENT
Render In Strict Bullet Format?: No
Detail Level: Zone
Continue Regimen: gabapentin 300 mg capsule: Take one capsule PO 1 h before bedtime x 1 day, then take one capsule PO BID x 1 day, then take one capsule PO TID for maintenance (pt only tolerating 1 PO QD)\\nmethotrexate sodium 2.5 mg tablet: Take 4 tablets PO BID every Friday\\nfolic acid 1 mg tablet: Take 1 PO QD\\nOlux 0.05% foam BID PRN flares (ears)\\nKetoconazole 2% shampoo [id number]\\nKetoconazole 2% cream BID to ears

## 2023-11-27 NOTE — PROCEDURE: CHRONOLOGY OF PRESENT ILLNESS
Chronology Of Present Illness: 8/8/2019:  Rash, located on the right ear and scalp. The rash is scaly, red, and itchy. The rash has been present for years. She has arthritis. She is not currently on any medications. She was started on Olux foam and ketoconazole cream.\\n11/17/2019: scalp is improved, ears are not. 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 She was started on hydrocortisone butuyrate and ketoconazole for the underarms. Biopsy obtained (eczematous dermatitis)\\n1/3/2020: Axillae have improved. scalp and ears as well. Continue current topicals and Pramosone lotion was added for the underarms.\\n2/5/2020: drastic improvement of axillae. She discontinued Hydrocortisone cream and is only using ketoconazole cream. scalp and ears are stable. Start Elidel cream to ears. \\n3/25/2020: scalp and ears not improved. Discussed Dupixent with patient. Consider after Cn19. Continue current topicals.\\n5/27/2020: scalp still flaring. Punch bx obtained of neck. (folliculitis, but not c/w clinical symptoms and presentation. Favor eczematous derm/AD. Continue current topicals. \\n8/4/2020: rash not well controlled on topicals. Start Dupixent enrollment. BSA 4, SEVERE. IGA score 4. Full biologic labs obtained. RX Valtrex given.\\n9/1/2020: DUPIXENT START TODAY. Recommend Systane eyedrops and Valtrex PRN as needed. \\n10/6/2020: Patient is somewhat improved, overall itching is better. Her ears are still very itchy. She is using Olux foam and ketoconazole cream to ears. She complains of new onset of joint pain above elbows and shoulders. She will see Dr. Dai and we will send letter.\\n11/3/2020: patient is improved overall. She still complains of itchy ears. Recommend talking to her audiologist to see if there are other hearing aids that are made of different material. RX Mimyx given for barrier cream. Continue Protopic 0.1% ointment QD PRN to ears and other topicals. Continue Dupixent injections. IGA score 1\\n12/9/2020: patient is much better. Ears are better. Hearing aid manufacturers told her that hearing aids are made out of medical grade hypoallergenic silicone. Itching has improved. Continue current topicals, antihistamines and Dupixent. Start Zonalon cream to ears for itching. Lab order given to patient today. Discussed that injection site reactions should subside.\\n3/17/21: eyes are very bothersome. Discussed possible component of rosacea. Rec warm rice sock compresses in addition to the Systane eye drops. For now continue Dupixent and topicals.\\n4/19/21: Eyes are still dry and bothersome. She states she will be seeing a dry eye specialist on Wednesday. She reports continued itching in the ears. Recommend not making too many changes to treatment regimen due to patient?s upcoming appt with dry eye specialist. Pramosone cream prescribed today for itching in ears, briefly discussed R/B/SE of gabapentin to consider starting at NOV if itching does not improve.\\n7/19/21: stable. Continue Dupixent Q 2 weeks and topicals. Recently diagnosed with gastroenteritis and possible sepsis. 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 Continue Dupixent and all topicals as needed.\\n1/19/22: patient is stable. Continue Dupixent and topicals PRN.\\n4/20/22: her itching (due to oak pollen) starts 3-4 days prior to next injection. Discussed Rinvoq with patient. Discussed it controls the itching better. Discussed risks, benefits and side effects with patient (increase in malignancies, cardio vascular events, blood clots, DVT - usually associated with prior underlying smoking history or other diseases). Discussed injecting Dupixent Q10 days vs Q14 days. Patient desires to continue Dupixent at shorter intervals. Consider adding on Tim inhibitor during environmental allergy season in the future.\\n5/18/22: Patient reports itching is minimally improved on modified Dupixent dosing (every 9-10 days). She reports worst areas are on the arms and inside ears. Re-discussed R/B/SE of Rinvoq in detail today. Patient reports worst 7/10 itch. Patient reports no personal hx of cancer or blood clots. She reports hx of migraines. Patient reports worsened eye dryness since starting Dupixent therapy. She is agreeable to start Rinvoq, insurance coverage check initiated today. Patient to obtain repeat full biologics labs, lab slip provided to patient to get drawn ASAP (MUST be fasting). She is due for her next Dupixent injection this Sunday. She has not been vaccinated for shingles, recommend getting shingles vaccine first ASAP, then inject Dupixent as scheduled on Sunday. F/u in 2 weeks. Sample saved for pt, plan to start therapy at NOV.\\n5/31/22: Patient received shingles vaccine right after LOV. Last Dupixent injection 5/22/22. Patient is agreeable to start Rinvoq today. Rinvoq PA denied, will submit appeal with SCORAD score (39). Symptoms are worsening, causing intense itchiness and sleeplessness. RINVOQ START TODAY. samples provided in office.\\n7/5/22: Patient reports itching was much better when taking Rinvoq. Decreased itch factor from 6-7, down to a 2-3. Patient reports light headedness, nausea, indigestion, twitching in her chest while taking Rinvoq. RINVOQ D/C. She stopped it a couple days ago due to symptoms. She is still recovering from the side effects. Recommend starting Opzelura at this point. Consider restarting Rinvoq every other day once symptoms have resolved. Discussed Vtama with patient as well if Opzelura is not improving her symptoms.\\n8/5/22: Pt reports itching has not improved since starting opzelura. Pt to RESTART DUPIXENT TODAY with loading dose after todays OV, sample given to Pt (1 300mg SC injection, pt states she has 1 pen at home). Rx sent to [facility].\\n9/2/22: pt states doing a little better after restarting Dupixent. Minor dry eyes and using Vytone and Olux foam topically currently. Will give pt Vtama sample today. Discussed considering restarting Rinvoq in future at lower dose. Patient states it worked the best but she complained of N/V and mild palpitations.\\n11/10/22: unimproved. First week after injection is good, then she itches again. Using a variety of topicals. Recommend trying Dermeleve cream and Remedy or glove in a bottle. Samples given. She will try injecting every week (supported by samples) x 1 month.\\n1/4/23: worse again. 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 Discussed restarting  Rinvoq at lower dosing vs prednisone taper. She reports insomnia with it. Patient agreeable to prednisone taper. Discussed MTX with patient. Will consider at follow up. Ears very itchy.\\n1/24/23: pt did not tolerate prednisone well, says eczema is unchanged. Itching on ears and shoulders today. Discussed MTX today and test dosage. Discussed regulatory blood work every month while on MTX. Pt would like to start MTX, will send Rx today. Pt to start 1/26. Will discontinue dupixent for now. MTX START TODAY.\\n2/22/23: pt fatigued and foggy on MTX. worse on dose day, better throughout the week. Ears look normal on exam today. Consider inc dosage at f/u if not further improved\\n3/21/23: pt reports less foggy and fatigue on current regimen. Continue same dose of MTX for another month, plan to increase NOV pending labs WNL.\\n4/4/23: new rash armpits. C/w ACD/ICD/eczema. Treat with TAC 0.1%. AD overall getting better. Less itching. Continue MTX per current regimen. Today not evaluated. Work in for rash.\\n04/21/23: pt reports rash under armpits has resolved. States she noticed a new rash yesterday also under LT underarm. As of today we will be increasing MTX dosage to 17.5/ week. Pt to take 3 QAM and 4 QPM only on fridays. Pt reports improvement in itching but still remains moderate to severe (5 to 6). Labs reviewed in OV, lab slip provided to pt for next OV.\\n5/23/23: unimproved. Recommend Olux foam to scalp. Recommend increase MTX to 4 pills BID. Patient agreeable.\\n6/20/23: pt stable. She reports side effects including fogginess and headaches with increased MTX dosage. Recommended continuing on current dosage and observe for resolution of side effects. Discussed patch testing in the fall.\\n07/19/23: pt reports stable. Still feeling nauseous and foggy but tolerable. Discussed continuing current regimen, pt agreeable.\\n8/22/23: Pt reports stable most days with itching mostly under control. Discussed continuing current regimen and Pt agreeable.\\n10/24/23: inadequately controlled. Discussed other tx options. Consider Gabapentin. Patient agreeable to START GABAPENTIN TODAY. Rx given. Continue methotrexate sodium 2.5 mg tablet: Take 4 tablets PO BID every Friday. Re check labs in 2 months.\\n11/27/23: pt reports a flare under the armpits and ear but is using Ketoconazole cream and triamcinalone which is knocking down the flare. Pt reports only being able to tolerate Gabapentin 300mg 1 PO QD before bedtime. Pt has increased fogginess with the gabapentin. Discussed starting Cibinqo in the future but will try current regimen for 1 more month to see if itching improves even more. Plan to continue topicals, gabapentin and methotrexate.
Detail Level: Zone

## 2023-12-11 ENCOUNTER — APPOINTMENT (RX ONLY)
Dept: URBAN - METROPOLITAN AREA CLINIC 125 | Facility: CLINIC | Age: 58
Setting detail: DERMATOLOGY
End: 2023-12-11

## 2023-12-11 DIAGNOSIS — L60.3 NAIL DYSTROPHY: ICD-10-CM

## 2023-12-11 DIAGNOSIS — L20.89 OTHER ATOPIC DERMATITIS: ICD-10-CM | Status: STABLE

## 2023-12-11 DIAGNOSIS — Z79.899 OTHER LONG TERM (CURRENT) DRUG THERAPY: ICD-10-CM

## 2023-12-11 PROBLEM — L60.9 NAIL DISORDER, UNSPECIFIED: Status: ACTIVE | Noted: 2023-12-11

## 2023-12-11 PROCEDURE — ? COUNSELING

## 2023-12-11 PROCEDURE — ? TREATMENT REGIMEN

## 2023-12-11 PROCEDURE — ? ADDITIONAL NOTES

## 2023-12-11 PROCEDURE — 99214 OFFICE O/P EST MOD 30 MIN: CPT

## 2023-12-11 PROCEDURE — ? CHRONOLOGY OF PRESENT ILLNESS

## 2023-12-11 PROCEDURE — ? PRESCRIPTION MEDICATION MANAGEMENT

## 2023-12-11 PROCEDURE — ? METHOTREXATE MONITORING

## 2023-12-11 PROCEDURE — ? ORDER TESTS

## 2023-12-11 PROCEDURE — ? HIGH RISK MEDICATION MONITORING

## 2023-12-11 ASSESSMENT — LOCATION DETAILED DESCRIPTION DERM
LOCATION DETAILED: LEFT LATERAL GREAT TOE
LOCATION DETAILED: RIGHT VENTRAL PROXIMAL FOREARM
LOCATION DETAILED: LEFT RADIAL DORSAL HAND
LOCATION DETAILED: LEFT VENTRAL PROXIMAL FOREARM
LOCATION DETAILED: LEFT MEDIAL FRONTAL SCALP
LOCATION DETAILED: LEFT AXILLARY VAULT
LOCATION DETAILED: RIGHT CYMBA CONCHA
LOCATION DETAILED: LEFT INFERIOR CRUS OF ANTIHELIX
LOCATION DETAILED: RIGHT ULNAR DORSAL HAND

## 2023-12-11 ASSESSMENT — LOCATION ZONE DERM
LOCATION ZONE: ARM
LOCATION ZONE: AXILLAE
LOCATION ZONE: HAND
LOCATION ZONE: TOE
LOCATION ZONE: EAR
LOCATION ZONE: SCALP

## 2023-12-11 ASSESSMENT — LOCATION SIMPLE DESCRIPTION DERM
LOCATION SIMPLE: LEFT AXILLARY VAULT
LOCATION SIMPLE: LEFT GREAT TOE
LOCATION SIMPLE: LEFT HAND
LOCATION SIMPLE: RIGHT FOREARM
LOCATION SIMPLE: LEFT EAR
LOCATION SIMPLE: LEFT SCALP
LOCATION SIMPLE: RIGHT EAR
LOCATION SIMPLE: RIGHT HAND
LOCATION SIMPLE: LEFT FOREARM

## 2023-12-11 NOTE — HPI: NAIL DYSTROPHY
How Severe Is It?: moderate
Is This A New Presentation, Or A Follow-Up?: Nail Dystrophy
Additional History: Pt reports nail has been discolored for months, but within the past few days has grown a bump on the nail which is painful. Pt denies any trauma that she can remember to the nail.

## 2023-12-11 NOTE — PROCEDURE: CHRONOLOGY OF PRESENT ILLNESS
Chronology Of Present Illness: 8/8/2019:  Rash, located on the right ear and scalp. The rash is scaly, red, and itchy. The rash has been present for years. She has arthritis. She is not currently on any medications. She was started on Olux foam and ketoconazole cream.\\n11/17/2019: scalp is improved, ears are not. 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 She was started on hydrocortisone butuyrate and ketoconazole for the underarms. Biopsy obtained (eczematous dermatitis)\\n1/3/2020: Axillae have improved. scalp and ears as well. Continue current topicals and Pramosone lotion was added for the underarms.\\n2/5/2020: drastic improvement of axillae. She discontinued Hydrocortisone cream and is only using ketoconazole cream. scalp and ears are stable. Start Elidel cream to ears. \\n3/25/2020: scalp and ears not improved. Discussed Dupixent with patient. Consider after Cn19. Continue current topicals.\\n5/27/2020: scalp still flaring. Punch bx obtained of neck. (folliculitis, but not c/w clinical symptoms and presentation. Favor eczematous derm/AD. Continue current topicals. \\n8/4/2020: rash not well controlled on topicals. Start Dupixent enrollment. BSA 4, SEVERE. IGA score 4. Full biologic labs obtained. RX Valtrex given.\\n9/1/2020: DUPIXENT START TODAY. Recommend Systane eyedrops and Valtrex PRN as needed. \\n10/6/2020: Patient is somewhat improved, overall itching is better. Her ears are still very itchy. She is using Olux foam and ketoconazole cream to ears. She complains of new onset of joint pain above elbows and shoulders. She will see Dr. Dai and we will send letter.\\n11/3/2020: patient is improved overall. She still complains of itchy ears. Recommend talking to her audiologist to see if there are other hearing aids that are made of different material. RX Mimyx given for barrier cream. Continue Protopic 0.1% ointment QD PRN to ears and other topicals. Continue Dupixent injections. IGA score 1\\n12/9/2020: patient is much better. Ears are better. Hearing aid manufacturers told her that hearing aids are made out of medical grade hypoallergenic silicone. Itching has improved. Continue current topicals, antihistamines and Dupixent. Start Zonalon cream to ears for itching. Lab order given to patient today. Discussed that injection site reactions should subside.\\n3/17/21: eyes are very bothersome. Discussed possible component of rosacea. Rec warm rice sock compresses in addition to the Systane eye drops. For now continue Dupixent and topicals.\\n4/19/21: Eyes are still dry and bothersome. She states she will be seeing a dry eye specialist on Wednesday. She reports continued itching in the ears. Recommend not making too many changes to treatment regimen due to patient?s upcoming appt with dry eye specialist. Pramosone cream prescribed today for itching in ears, briefly discussed R/B/SE of gabapentin to consider starting at NOV if itching does not improve.\\n7/19/21: stable. Continue Dupixent Q 2 weeks and topicals. Recently diagnosed with gastroenteritis and possible sepsis. 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 Continue Dupixent and all topicals as needed.\\n1/19/22: patient is stable. Continue Dupixent and topicals PRN.\\n4/20/22: her itching (due to oak pollen) starts 3-4 days prior to next injection. Discussed Rinvoq with patient. Discussed it controls the itching better. Discussed risks, benefits and side effects with patient (increase in malignancies, cardio vascular events, blood clots, DVT - usually associated with prior underlying smoking history or other diseases). Discussed injecting Dupixent Q10 days vs Q14 days. Patient desires to continue Dupixent at shorter intervals. Consider adding on Tim inhibitor during environmental allergy season in the future.\\n5/18/22: Patient reports itching is minimally improved on modified Dupixent dosing (every 9-10 days). She reports worst areas are on the arms and inside ears. Re-discussed R/B/SE of Rinvoq in detail today. Patient reports worst 7/10 itch. Patient reports no personal hx of cancer or blood clots. She reports hx of migraines. Patient reports worsened eye dryness since starting Dupixent therapy. She is agreeable to start Rinvoq, insurance coverage check initiated today. Patient to obtain repeat full biologics labs, lab slip provided to patient to get drawn ASAP (MUST be fasting). She is due for her next Dupixent injection this Sunday. She has not been vaccinated for shingles, recommend getting shingles vaccine first ASAP, then inject Dupixent as scheduled on Sunday. F/u in 2 weeks. Sample saved for pt, plan to start therapy at NOV.\\n5/31/22: Patient received shingles vaccine right after LOV. Last Dupixent injection 5/22/22. Patient is agreeable to start Rinvoq today. Rinvoq PA denied, will submit appeal with SCORAD score (39). Symptoms are worsening, causing intense itchiness and sleeplessness. RINVOQ START TODAY. samples provided in office.\\n7/5/22: Patient reports itching was much better when taking Rinvoq. Decreased itch factor from 6-7, down to a 2-3. Patient reports light headedness, nausea, indigestion, twitching in her chest while taking Rinvoq. RINVOQ D/C. She stopped it a couple days ago due to symptoms. She is still recovering from the side effects. Recommend starting Opzelura at this point. Consider restarting Rinvoq every other day once symptoms have resolved. Discussed Vtama with patient as well if Opzelura is not improving her symptoms.\\n8/5/22: Pt reports itching has not improved since starting opzelura. Pt to RESTART DUPIXENT TODAY with loading dose after todays OV, sample given to Pt (1 300mg SC injection, pt states she has 1 pen at home). Rx sent to [facility].\\n9/2/22: pt states doing a little better after restarting Dupixent. Minor dry eyes and using Vytone and Olux foam topically currently. Will give pt Vtama sample today. Discussed considering restarting Rinvoq in future at lower dose. Patient states it worked the best but she complained of N/V and mild palpitations.\\n11/10/22: unimproved. First week after injection is good, then she itches again. Using a variety of topicals. Recommend trying Dermeleve cream and Remedy or glove in a bottle. Samples given. She will try injecting every week (supported by samples) x 1 month.\\n1/4/23: worse again. 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 Discussed restarting  Rinvoq at lower dosing vs prednisone taper. She reports insomnia with it. Patient agreeable to prednisone taper. Discussed MTX with patient. Will consider at follow up. Ears very itchy.\\n1/24/23: pt did not tolerate prednisone well, says eczema is unchanged. Itching on ears and shoulders today. Discussed MTX today and test dosage. Discussed regulatory blood work every month while on MTX. Pt would like to start MTX, will send Rx today. Pt to start 1/26. Will discontinue dupixent for now. MTX START TODAY.\\n2/22/23: pt fatigued and foggy on MTX. worse on dose day, better throughout the week. Ears look normal on exam today. Consider inc dosage at f/u if not further improved\\n3/21/23: pt reports less foggy and fatigue on current regimen. Continue same dose of MTX for another month, plan to increase NOV pending labs WNL.\\n4/4/23: new rash armpits. C/w ACD/ICD/eczema. Treat with TAC 0.1%. AD overall getting better. Less itching. Continue MTX per current regimen. Today not evaluated. Work in for rash.\\n04/21/23: pt reports rash under armpits has resolved. States she noticed a new rash yesterday also under LT underarm. As of today we will be increasing MTX dosage to 17.5/ week. Pt to take 3 QAM and 4 QPM only on fridays. Pt reports improvement in itching but still remains moderate to severe (5 to 6). Labs reviewed in OV, lab slip provided to pt for next OV.\\n5/23/23: unimproved. Recommend Olux foam to scalp. Recommend increase MTX to 4 pills BID. Patient agreeable.\\n6/20/23: pt stable. She reports side effects including fogginess and headaches with increased MTX dosage. Recommended continuing on current dosage and observe for resolution of side effects. Discussed patch testing in the fall.\\n07/19/23: pt reports stable. Still feeling nauseous and foggy but tolerable. Discussed continuing current regimen, pt agreeable.\\n8/22/23: Pt reports stable most days with itching mostly under control. Discussed continuing current regimen and Pt agreeable.\\n10/24/23: inadequately controlled. Discussed other tx options. Consider Gabapentin. Patient agreeable to START GABAPENTIN TODAY. Rx given. Continue methotrexate sodium 2.5 mg tablet: Take 4 tablets PO BID every Friday. Re check labs in 2 months.\\n11/27/23: pt reports a flare under the armpits and ear but is using Ketoconazole cream and triamcinalone which is knocking down the flare. Pt reports only being able to tolerate Gabapentin 300mg 1 PO QD before bedtime. Pt has increased fogginess with the gabapentin. Discussed starting Cibinqo in the future but will try current regimen for 1 more month to see if itching improves even more. Plan to continue topicals, gabapentin and methotrexate.\\n12/11/23: pt comes in today with nail thickening and pain, discussed with pt that the nail is most likely due to trauma. Also discussed that because pt is on methotrexate it makes her immunosuppressed which could lead to infections so advised Pt to keep an eye on the toe and watch out for any erythema or irritation. Continue current regimen and f/u as scheduled.
Detail Level: Zone

## 2023-12-11 NOTE — PROCEDURE: TREATMENT REGIMEN
Detail Level: Zone
Plan: Discussed just watching nail for now. If nail gets works discussed possibly starting Antibiotics but will hold off for now.
Detail Level: Generalized
Plan: Pt has standing order. Last labs 10/17/23 WNL. Plan to get blood work in 2 months.

## 2023-12-20 ENCOUNTER — RX ONLY (OUTPATIENT)
Age: 58
Setting detail: RX ONLY
End: 2023-12-20

## 2023-12-20 RX ORDER — METHOTREXATE SODIUM 2.5 MG/1
TABLET ORAL
Qty: 32 | Refills: 0 | Status: ERX

## 2024-01-03 ENCOUNTER — APPOINTMENT (RX ONLY)
Dept: URBAN - METROPOLITAN AREA CLINIC 125 | Facility: CLINIC | Age: 59
Setting detail: DERMATOLOGY
End: 2024-01-03

## 2024-01-03 DIAGNOSIS — L82.0 INFLAMED SEBORRHEIC KERATOSIS: ICD-10-CM | Status: RESOLVING

## 2024-01-03 DIAGNOSIS — L60.3 NAIL DYSTROPHY: ICD-10-CM | Status: INADEQUATELY CONTROLLED

## 2024-01-03 DIAGNOSIS — L20.89 OTHER ATOPIC DERMATITIS: ICD-10-CM | Status: WELL CONTROLLED

## 2024-01-03 DIAGNOSIS — Z79.899 OTHER LONG TERM (CURRENT) DRUG THERAPY: ICD-10-CM

## 2024-01-03 PROBLEM — D48.5 NEOPLASM OF UNCERTAIN BEHAVIOR OF SKIN: Status: ACTIVE | Noted: 2024-01-03

## 2024-01-03 PROBLEM — L60.9 NAIL DISORDER, UNSPECIFIED: Status: ACTIVE | Noted: 2024-01-03

## 2024-01-03 PROCEDURE — ? PRESCRIPTION MEDICATION MANAGEMENT

## 2024-01-03 PROCEDURE — 99214 OFFICE O/P EST MOD 30 MIN: CPT

## 2024-01-03 PROCEDURE — ? PRESCRIPTION

## 2024-01-03 PROCEDURE — ? METHOTREXATE MONITORING

## 2024-01-03 PROCEDURE — ? ORDER TESTS

## 2024-01-03 PROCEDURE — ? OBSERVATION AND MEASURE

## 2024-01-03 PROCEDURE — ? COUNSELING

## 2024-01-03 PROCEDURE — ? TREATMENT REGIMEN

## 2024-01-03 PROCEDURE — ? CHRONOLOGY OF PRESENT ILLNESS

## 2024-01-03 PROCEDURE — ? HIGH RISK MEDICATION MONITORING

## 2024-01-03 PROCEDURE — ? ADDITIONAL NOTES

## 2024-01-03 RX ORDER — EFINACONAZOLE 100 MG/ML
SOLUTION TOPICAL
Qty: 4 | Refills: 1 | Status: ERX | COMMUNITY
Start: 2024-01-03

## 2024-01-03 RX ADMIN — EFINACONAZOLE: 100 SOLUTION TOPICAL at 00:00

## 2024-01-03 ASSESSMENT — LOCATION SIMPLE DESCRIPTION DERM
LOCATION SIMPLE: LEFT SCALP
LOCATION SIMPLE: RIGHT HAND
LOCATION SIMPLE: RIGHT EAR
LOCATION SIMPLE: LEFT AXILLARY VAULT
LOCATION SIMPLE: LEFT FOREARM
LOCATION SIMPLE: LEFT HAND
LOCATION SIMPLE: RIGHT FOREHEAD
LOCATION SIMPLE: LEFT GREAT TOE
LOCATION SIMPLE: RIGHT FOREARM
LOCATION SIMPLE: LEFT EAR

## 2024-01-03 ASSESSMENT — LOCATION DETAILED DESCRIPTION DERM
LOCATION DETAILED: LEFT INFERIOR CRUS OF ANTIHELIX
LOCATION DETAILED: RIGHT INFERIOR LATERAL FOREHEAD
LOCATION DETAILED: LEFT RADIAL DORSAL HAND
LOCATION DETAILED: LEFT AXILLARY VAULT
LOCATION DETAILED: RIGHT ULNAR DORSAL HAND
LOCATION DETAILED: RIGHT VENTRAL PROXIMAL FOREARM
LOCATION DETAILED: RIGHT CYMBA CONCHA
LOCATION DETAILED: LEFT MEDIAL FRONTAL SCALP
LOCATION DETAILED: LEFT VENTRAL PROXIMAL FOREARM
LOCATION DETAILED: LEFT LATERAL GREAT TOE

## 2024-01-03 ASSESSMENT — LOCATION ZONE DERM
LOCATION ZONE: TOE
LOCATION ZONE: SCALP
LOCATION ZONE: EAR
LOCATION ZONE: AXILLAE
LOCATION ZONE: ARM
LOCATION ZONE: HAND
LOCATION ZONE: FACE

## 2024-01-03 ASSESSMENT — SEVERITY ASSESSMENT 2020: SEVERITY 2020: CLEAR

## 2024-01-03 ASSESSMENT — BSA ECZEMA: % BODY COVERED IN ECZEMA: 0

## 2024-01-03 NOTE — PROCEDURE: CHRONOLOGY OF PRESENT ILLNESS
Chronology Of Present Illness: 8/8/2019:  Rash, located on the right ear and scalp. The rash is scaly, red, and itchy. The rash has been present for years. She has arthritis. She is not currently on any medications. She was started on Olux foam and ketoconazole cream.\\n11/17/2019: scalp is improved, ears are not. 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 She was started on hydrocortisone butuyrate and ketoconazole for the underarms. Biopsy obtained (eczematous dermatitis)\\n1/3/2020: Axillae have improved. scalp and ears as well. Continue current topicals and Pramosone lotion was added for the underarms.\\n2/5/2020: drastic improvement of axillae. She discontinued Hydrocortisone cream and is only using ketoconazole cream. scalp and ears are stable. Start Elidel cream to ears. \\n3/25/2020: scalp and ears not improved. Discussed Dupixent with patient. Consider after Cn19. Continue current topicals.\\n5/27/2020: scalp still flaring. Punch bx obtained of neck. (folliculitis, but not c/w clinical symptoms and presentation. Favor eczematous derm/AD. Continue current topicals. \\n8/4/2020: rash not well controlled on topicals. Start Dupixent enrollment. BSA 4, SEVERE. IGA score 4. Full biologic labs obtained. RX Valtrex given.\\n9/1/2020: DUPIXENT START TODAY. Recommend Systane eyedrops and Valtrex PRN as needed. \\n10/6/2020: Patient is somewhat improved, overall itching is better. Her ears are still very itchy. She is using Olux foam and ketoconazole cream to ears. She complains of new onset of joint pain above elbows and shoulders. She will see Dr. Dai and we will send letter.\\n11/3/2020: patient is improved overall. She still complains of itchy ears. Recommend talking to her audiologist to see if there are other hearing aids that are made of different material. RX Mimyx given for barrier cream. Continue Protopic 0.1% ointment QD PRN to ears and other topicals. Continue Dupixent injections. IGA score 1\\n12/9/2020: patient is much better. Ears are better. Hearing aid manufacturers told her that hearing aids are made out of medical grade hypoallergenic silicone. Itching has improved. Continue current topicals, antihistamines and Dupixent. Start Zonalon cream to ears for itching. Lab order given to patient today. Discussed that injection site reactions should subside.\\n3/17/21: eyes are very bothersome. Discussed possible component of rosacea. Rec warm rice sock compresses in addition to the Systane eye drops. For now continue Dupixent and topicals.\\n4/19/21: Eyes are still dry and bothersome. She states she will be seeing a dry eye specialist on Wednesday. She reports continued itching in the ears. Recommend not making too many changes to treatment regimen due to patient?s upcoming appt with dry eye specialist. Pramosone cream prescribed today for itching in ears, briefly discussed R/B/SE of gabapentin to consider starting at NOV if itching does not improve.\\n7/19/21: stable. Continue Dupixent Q 2 weeks and topicals. Recently diagnosed with gastroenteritis and possible sepsis. 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 Continue Dupixent and all topicals as needed.\\n1/19/22: patient is stable. Continue Dupixent and topicals PRN.\\n4/20/22: her itching (due to oak pollen) starts 3-4 days prior to next injection. Discussed Rinvoq with patient. Discussed it controls the itching better. Discussed risks, benefits and side effects with patient (increase in malignancies, cardio vascular events, blood clots, DVT - usually associated with prior underlying smoking history or other diseases). Discussed injecting Dupixent Q10 days vs Q14 days. Patient desires to continue Dupixent at shorter intervals. Consider adding on Tim inhibitor during environmental allergy season in the future.\\n5/18/22: Patient reports itching is minimally improved on modified Dupixent dosing (every 9-10 days). She reports worst areas are on the arms and inside ears. Re-discussed R/B/SE of Rinvoq in detail today. Patient reports worst 7/10 itch. Patient reports no personal hx of cancer or blood clots. She reports hx of migraines. Patient reports worsened eye dryness since starting Dupixent therapy. She is agreeable to start Rinvoq, insurance coverage check initiated today. Patient to obtain repeat full biologics labs, lab slip provided to patient to get drawn ASAP (MUST be fasting). She is due for her next Dupixent injection this Sunday. She has not been vaccinated for shingles, recommend getting shingles vaccine first ASAP, then inject Dupixent as scheduled on Sunday. F/u in 2 weeks. Sample saved for pt, plan to start therapy at NOV.\\n5/31/22: Patient received shingles vaccine right after LOV. Last Dupixent injection 5/22/22. Patient is agreeable to start Rinvoq today. Rinvoq PA denied, will submit appeal with SCORAD score (39). Symptoms are worsening, causing intense itchiness and sleeplessness. RINVOQ START TODAY. samples provided in office.\\n7/5/22: Patient reports itching was much better when taking Rinvoq. Decreased itch factor from 6-7, down to a 2-3. Patient reports light headedness, nausea, indigestion, twitching in her chest while taking Rinvoq. RINVOQ D/C. She stopped it a couple days ago due to symptoms. She is still recovering from the side effects. Recommend starting Opzelura at this point. Consider restarting Rinvoq every other day once symptoms have resolved. Discussed Vtama with patient as well if Opzelura is not improving her symptoms.\\n8/5/22: Pt reports itching has not improved since starting opzelura. Pt to RESTART DUPIXENT TODAY with loading dose after todays OV, sample given to Pt (1 300mg SC injection, pt states she has 1 pen at home). Rx sent to [facility].\\n9/2/22: pt states doing a little better after restarting Dupixent. Minor dry eyes and using Vytone and Olux foam topically currently. Will give pt Vtama sample today. Discussed considering restarting Rinvoq in future at lower dose. Patient states it worked the best but she complained of N/V and mild palpitations.\\n11/10/22: unimproved. First week after injection is good, then she itches again. Using a variety of topicals. Recommend trying Dermeleve cream and Remedy or glove in a bottle. Samples given. She will try injecting every week (supported by samples) x 1 month.\\n1/4/23: worse again. 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 Discussed restarting  Rinvoq at lower dosing vs prednisone taper. She reports insomnia with it. Patient agreeable to prednisone taper. Discussed MTX with patient. Will consider at follow up. Ears very itchy.\\n1/24/23: pt did not tolerate prednisone well, says eczema is unchanged. Itching on ears and shoulders today. Discussed MTX today and test dosage. Discussed regulatory blood work every month while on MTX. Pt would like to start MTX, will send Rx today. Pt to start 1/26. Will discontinue dupixent for now. MTX START TODAY.\\n2/22/23: pt fatigued and foggy on MTX. worse on dose day, better throughout the week. Ears look normal on exam today. Consider inc dosage at f/u if not further improved\\n3/21/23: pt reports less foggy and fatigue on current regimen. Continue same dose of MTX for another month, plan to increase NOV pending labs WNL.\\n4/4/23: new rash armpits. C/w ACD/ICD/eczema. Treat with TAC 0.1%. AD overall getting better. Less itching. Continue MTX per current regimen. Today not evaluated. Work in for rash.\\n04/21/23: pt reports rash under armpits has resolved. States she noticed a new rash yesterday also under LT underarm. As of today we will be increasing MTX dosage to 17.5/ week. Pt to take 3 QAM and 4 QPM only on fridays. Pt reports improvement in itching but still remains moderate to severe (5 to 6). Labs reviewed in OV, lab slip provided to pt for next OV.\\n5/23/23: unimproved. Recommend Olux foam to scalp. Recommend increase MTX to 4 pills BID. Patient agreeable.\\n6/20/23: pt stable. She reports side effects including fogginess and headaches with increased MTX dosage. Recommended continuing on current dosage and observe for resolution of side effects. Discussed patch testing in the fall.\\n07/19/23: pt reports stable. Still feeling nauseous and foggy but tolerable. Discussed continuing current regimen, pt agreeable.\\n8/22/23: Pt reports stable most days with itching mostly under control. Discussed continuing current regimen and Pt agreeable.\\n10/24/23: inadequately controlled. Discussed other tx options. Consider Gabapentin. Patient agreeable to START GABAPENTIN TODAY. Rx given. Continue methotrexate sodium 2.5 mg tablet: Take 4 tablets PO BID every Friday. Re check labs in 2 months.\\n11/27/23: pt reports a flare under the armpits and ear but is using Ketoconazole cream and triamcinalone which is knocking down the flare. Pt reports only being able to tolerate Gabapentin 300mg 1 PO QD before bedtime. Pt has increased fogginess with the gabapentin. Discussed starting Cibinqo in the future but will try current regimen for 1 more month to see if itching improves even more. Plan to continue topicals, gabapentin and methotrexate.\\n12/11/23: pt comes in today with nail thickening and pain, discussed with pt that the nail is most likely due to trauma. Also discussed that because pt is on methotrexate it makes her immunosuppressed which could lead to infections so advised Pt to keep an eye on the toe and watch out for any erythema or irritation. Continue current regimen and f/u as scheduled.\\n1/3/23: pt states skin is doing well. Since LOV Pt did have a flare under the armpits but was well controlled with TAC and Ketoconazole. Pt doing well on current regimen and has been able to increase gabapentin to 300mg PO BID. Pt still having toe pain so will begin treatment. Discussed going every other month on the bloodwork, Pt agreeable. F/u with pt in a month.
Detail Level: Zone

## 2024-01-03 NOTE — PROCEDURE: METHOTREXATE MONITORING
Add Additional Dosage: No
Dosage 4 (Mg/Week): 0
Dosage 2 (Mg/Week): 17.5
Dosage 3 (Mg/Week): 20
Detail Level: Zone
Dosage 1 (Mg/Week): 15
Date Dosage 3 Started: 05/23/2023
Current Dosage: 17.5mg/week
Date Dosage 1 Started: 01/24/2023
Date Dosage 2 Started: 04/21/2023
Calculate Cumulative Dosage Automatically?: Yes
Comments: start 1/24/2023
Most Recent Cbc (Optional): 12/20/23

## 2024-01-03 NOTE — PROCEDURE: TREATMENT REGIMEN
Detail Level: Generalized
Plan: Pt has standing order. Last labs 10/17/23 WNL. Plan to get blood work in 2 months.
Detail Level: Zone
Plan: Discussed freezing in future if lesion flares up again.

## 2024-01-03 NOTE — PROCEDURE: PRESCRIPTION MEDICATION MANAGEMENT
Render In Strict Bullet Format?: No
Detail Level: Zone
Continue Regimen: gabapentin 300 mg capsule: Take one capsule PO 1 h before bedtime x 1 day, then take one capsule PO BID x 1 day, then take one capsule PO TID for maintenance (pt only tolerating 1 PO QD)\\nmethotrexate sodium 2.5 mg tablet: Take 4 tablets PO BID every Friday\\nfolic acid 1 mg tablet: Take 1 PO QD\\nOlux 0.05% foam BID PRN flares (ears)\\nKetoconazole 2% shampoo [id number]\\nKetoconazole 2% cream BID to ears
Initiate Treatment: Jublia 10 % topical solution with applicator : QD AAA left great toenail. Clean once a week with rubbing alcohol.
Plan: Discussed clear nails if Jublia not covered.

## 2024-01-03 NOTE — PROCEDURE: HIGH RISK MEDICATION MONITORING
Itraconazole Counseling:  I discussed with the patient the risks of itraconazole including but not limited to liver damage, nausea/vomiting, neuropathy, and severe allergy.  The patient understands that this medication is best absorbed when taken with acidic beverages such as non-diet cola or ginger ale.  The patient understands that monitoring is required including baseline LFTs and repeat LFTs at intervals.  The patient understands that they are to contact us or the primary physician if concerning signs are noted.
Azithromycin Counseling:  I discussed with the patient the risks of azithromycin including but not limited to GI upset, allergic reaction, drug rash, diarrhea, and yeast infections.
Ivermectin Pregnancy And Lactation Text: This medication is Pregnancy Category C and it isn't known if it is safe during pregnancy. It is also excreted in breast milk.
Infliximab Pregnancy And Lactation Text: This medication is Pregnancy Category B and is considered safe during pregnancy. It is unknown if this medication is excreted in breast milk.
Xolair Counseling:  Patient informed of potential adverse effects including but not limited to fever, muscle aches, rash and allergic reactions.  The patient verbalized understanding of the proper use and possible adverse effects of Xolair.  All of the patient's questions and concerns were addressed.
Bexarotene Counseling:  I discussed with the patient the risks of bexarotene including but not limited to hair loss, dry lips/skin/eyes, liver abnormalities, hyperlipidemia, pancreatitis, depression/suicidal ideation, photosensitivity, drug rash/allergic reactions, hypothyroidism, anemia, leukopenia, infection, cataracts, and teratogenicity.  Patient understands that they will need regular blood tests to check lipid profile, liver function tests, white blood cell count, thyroid function tests and pregnancy test if applicable.
Hydroxychloroquine Pregnancy And Lactation Text: This medication has been shown to cause fetal harm but it isn't assigned a Pregnancy Risk Category. There are small amounts excreted in breast milk.
Xolair Pregnancy And Lactation Text: This medication is Pregnancy Category B and is considered safe during pregnancy. This medication is excreted in breast milk.
Nsaids Counseling: NSAID Counseling: I discussed with the patient that NSAIDs should be taken with food. Prolonged use of NSAIDs can result in the development of stomach ulcers.  Patient advised to stop taking NSAIDs if abdominal pain occurs.  The patient verbalized understanding of the proper use and possible adverse effects of NSAIDs.  All of the patient's questions and concerns were addressed.
Valtrex Pregnancy And Lactation Text: this medication is Pregnancy Category B and is considered safe during pregnancy. This medication is not directly found in breast milk but it's metabolite acyclovir is present.
Eucrisa Pregnancy And Lactation Text: This medication has not been assigned a Pregnancy Risk Category but animal studies failed to show danger with the topical medication. It is unknown if the medication is excreted in breast milk.
Quinolones Pregnancy And Lactation Text: This medication is Pregnancy Category C and it isn't know if it is safe during pregnancy. It is also excreted in breast milk.
Rituxan Counseling:  I discussed with the patient the risks of Rituxan infusions. Side effects can include infusion reactions, severe drug rashes including mucocutaneous reactions, reactivation of latent hepatitis and other infections and rarely progressive multifocal leukoencephalopathy.  All of the patient's questions and concerns were addressed.
Topical Clindamycin Pregnancy And Lactation Text: This medication is Pregnancy Category B and is considered safe during pregnancy. It is unknown if it is excreted in breast milk.
Bexarotene Pregnancy And Lactation Text: This medication is Pregnancy Category X and should not be given to women who are pregnant or may become pregnant. This medication should not be used if you are breast feeding.
Nsaids Pregnancy And Lactation Text: These medications are considered safe up to 30 weeks gestation. It is excreted in breast milk.
Topical Sulfur Applications Counseling: Topical Sulfur Counseling: Patient counseled that this medication may cause skin irritation or allergic reactions.  In the event of skin irritation, the patient was advised to reduce the amount of the drug applied or use it less frequently.   The patient verbalized understanding of the proper use and possible adverse effects of topical sulfur application.  All of the patient's questions and concerns were addressed.
Rifampin Counseling: I discussed with the patient the risks of rifampin including but not limited to liver damage, kidney damage, red-orange body fluids, nausea/vomiting and severe allergy.
Azithromycin Pregnancy And Lactation Text: This medication is considered safe during pregnancy and is also secreted in breast milk.
Hydroquinone Counseling:  Patient advised that medication may result in skin irritation, lightening (hypopigmentation), dryness, and burning.  In the event of skin irritation, the patient was advised to reduce the amount of the drug applied or use it less frequently.  Rarely, spots that are treated with hydroquinone can become darker (pseudoochronosis).  Should this occur, patient instructed to stop medication and call the office. The patient verbalized understanding of the proper use and possible adverse effects of hydroquinone.  All of the patient's questions and concerns were addressed.
Bactrim Counseling:  I discussed with the patient the risks of sulfa antibiotics including but not limited to GI upset, allergic reaction, drug rash, diarrhea, dizziness, photosensitivity, and yeast infections.  Rarely, more serious reactions can occur including but not limited to aplastic anemia, agranulocytosis, methemoglobinemia, blood dyscrasias, liver or kidney failure, lung infiltrates or desquamative/blistering drug rashes.
Ketoconazole Counseling:   Patient counseled regarding improving absorption with orange juice.  Adverse effects include but are not limited to breast enlargement, headache, diarrhea, nausea, upset stomach, liver function test abnormalities, taste disturbance, and stomach pain.  There is a rare possibility of liver failure that can occur when taking ketoconazole. The patient understands that monitoring of LFTs may be required, especially at baseline. The patient verbalized understanding of the proper use and possible adverse effects of ketoconazole.  All of the patient's questions and concerns were addressed.
Isotretinoin Counseling: Patient should get monthly blood tests, not donate blood, not drive at night if vision affected, not share medication, and not undergo elective surgery for 6 months after tx completed. Side effects reviewed, pt to contact office should one occur.
Use Enhanced Medication Counseling?: No
Odomzo Counseling- I discussed with the patient the risks of Odomzo including but not limited to nausea, vomiting, diarrhea, constipation, weight loss, changes in the sense of taste, decreased appetite, muscle spasms, and hair loss.  The patient verbalized understanding of the proper use and possible adverse effects of Odomzo.  All of the patient's questions and concerns were addressed.
Topical Sulfur Applications Pregnancy And Lactation Text: This medication is Pregnancy Category C and has an unknown safety profile during pregnancy. It is unknown if this topical medication is excreted in breast milk.
Rituxan Pregnancy And Lactation Text: This medication is Pregnancy Category C and it isn't know if it is safe during pregnancy. It is unknown if this medication is excreted in breast milk but similar antibodies are known to be excreted.
Azathioprine Counseling:  I discussed with the patient the risks of azathioprine including but not limited to myelosuppression, immunosuppression, hepatotoxicity, lymphoma, and infections.  The patient understands that monitoring is required including baseline LFTs, Creatinine, possible TPMP genotyping and weekly CBCs for the first month and then every 2 weeks thereafter.  The patient verbalized understanding of the proper use and possible adverse effects of azathioprine.  All of the patient's questions and concerns were addressed.
Azathioprine Pregnancy And Lactation Text: This medication is Pregnancy Category D and isn't considered safe during pregnancy. It is unknown if this medication is excreted in breast milk.
Ketoconazole Pregnancy And Lactation Text: This medication is Pregnancy Category C and it isn't know if it is safe during pregnancy. It is also excreted in breast milk and breast feeding isn't recommended.
Siliq Counseling:  I discussed with the patient the risks of Siliq including but not limited to new or worsening depression, suicidal thoughts and behavior, immunosuppression, malignancy, posterior leukoencephalopathy syndrome, and serious infections.  The patient understands that monitoring is required including a PPD at baseline and must alert us or the primary physician if symptoms of infection or other concerning signs are noted. There is also a special program designed to monitor depression which is required with Siliq.
Bactrim Pregnancy And Lactation Text: This medication is Pregnancy Category D and is known to cause fetal risk.  It is also excreted in breast milk.
Isotretinoin Pregnancy And Lactation Text: This medication is Pregnancy Category X and is considered extremely dangerous during pregnancy. It is unknown if it is excreted in breast milk.
High Dose Vitamin A Counseling: Side effects reviewed, pt to contact office should one occur.
Odomzo Pregnancy And Lactation Text: This medication is Pregnancy Category X and is absolutely contraindicated during pregnancy. It is unknown if it is excreted in breast milk.
Zyclara Counseling:  I discussed with the patient the risks of imiquimod including but not limited to erythema, scaling, itching, weeping, crusting, and pain.  Patient understands that the inflammatory response to imiquimod is variable from person to person and was educated regarded proper titration schedule.  If flu-like symptoms develop, patient knows to discontinue the medication and contact us.
Imiquimod Counseling:  I discussed with the patient the risks of imiquimod including but not limited to erythema, scaling, itching, weeping, crusting, and pain.  Patient understands that the inflammatory response to imiquimod is variable from person to person and was educated regarded proper titration schedule.  If flu-like symptoms develop, patient knows to discontinue the medication and contact us.
Imiquimod Pregnancy And Lactation Text: This medication is Pregnancy Category C. It is unknown if this medication is excreted in breast milk.
Cephalexin Counseling: I counseled the patient regarding use of cephalexin as an antibiotic for prophylactic and/or therapeutic purposes. Cephalexin (commonly prescribed under brand name Keflex) is a cephalosporin antibiotic which is active against numerous classes of bacteria, including most skin bacteria. Side effects may include nausea, diarrhea, gastrointestinal upset, rash, hives, yeast infections, and in rare cases, hepatitis, kidney disease, seizures, fever, confusion, neurologic symptoms, and others. Patients with severe allergies to penicillin medications are cautioned that there is about a 10% incidence of cross-reactivity with cephalosporins. When possible, patients with penicillin allergies should use alternatives to cephalosporins for antibiotic therapy.
Siliq Pregnancy And Lactation Text: The risk during pregnancy and breastfeeding is uncertain with this medication.
Terbinafine Counseling: Patient counseling regarding adverse effects of terbinafine including but not limited to headache, diarrhea, rash, upset stomach, liver function test abnormalities, itching, taste/smell disturbance, nausea, abdominal pain, and flatulence.  There is a rare possibility of liver failure that can occur when taking terbinafine.  The patient understands that a baseline LFT and kidney function test may be required. The patient verbalized understanding of the proper use and possible adverse effects of terbinafine.  All of the patient's questions and concerns were addressed.
Cellcept Counseling:  I discussed with the patient the risks of mycophenolate mofetil including but not limited to infection/immunosuppression, GI upset, hypokalemia, hypercholesterolemia, bone marrow suppression, lymphoproliferative disorders, malignancy, GI ulceration/bleed/perforation, colitis, interstitial lung disease, kidney failure, progressive multifocal leukoencephalopathy, and birth defects.  The patient understands that monitoring is required including a baseline creatinine and regular CBC testing. In addition, patient must alert us immediately if symptoms of infection or other concerning signs are noted.
High Dose Vitamin A Pregnancy And Lactation Text: High dose vitamin A therapy is contraindicated during pregnancy and breast feeding.
Cimzia Counseling:  I discussed with the patient the risks of Cimzia including but not limited to immunosuppression, allergic reactions and infections.  The patient understands that monitoring is required including a PPD at baseline and must alert us or the primary physician if symptoms of infection or other concerning signs are noted.
Otezla Counseling: The side effects of Otezla were discussed with the patient, including but not limited to worsening or new depression, weight loss, diarrhea, nausea, upper respiratory tract infection, and headache. Patient instructed to call the office should any adverse effect occur.  The patient verbalized understanding of the proper use and possible adverse effects of Otezla.  All the patient's questions and concerns were addressed.
Terbinafine Pregnancy And Lactation Text: This medication is Pregnancy Category B and is considered safe during pregnancy. It is also excreted in breast milk and breast feeding isn't recommended.
Otezla Pregnancy And Lactation Text: This medication is Pregnancy Category C and it isn't known if it is safe during pregnancy. It is unknown if it is excreted in breast milk.
Minoxidil Counseling: Minoxidil is a topical medication which can increase blood flow where it is applied. It is uncertain how this medication increases hair growth. Side effects are uncommon and include stinging and allergic reactions.
Detail Level: Generalized
Simponi Counseling:  I discussed with the patient the risks of golimumab including but not limited to myelosuppression, immunosuppression, autoimmune hepatitis, demyelinating diseases, lymphoma, and serious infections.  The patient understands that monitoring is required including a PPD at baseline and must alert us or the primary physician if symptoms of infection or other concerning signs are noted.
Cephalexin Pregnancy And Lactation Text: This medication is Pregnancy Category B and considered safe during pregnancy.  It is also excreted in breast milk but can be used safely for shorter doses.
Cimzia Pregnancy And Lactation Text: This medication crosses the placenta but can be considered safe in certain situations. Cimzia may be excreted in breast milk.
Oxybutynin Counseling:  I discussed with the patient the risks of oxybutynin including but not limited to skin rash, drowsiness, dry mouth, difficulty urinating, and blurred vision.
Cyclophosphamide Counseling:  I discussed with the patient the risks of cyclophosphamide including but not limited to hair loss, hormonal abnormalities, decreased fertility, abdominal pain, diarrhea, nausea and vomiting, bone marrow suppression and infection. The patient understands that monitoring is required while taking this medication.
Clindamycin Counseling: I counseled the patient regarding use of clindamycin as an antibiotic for prophylactic and/or therapeutic purposes. Clindamycin is active against numerous classes of bacteria, including skin bacteria. Side effects may include nausea, diarrhea, gastrointestinal upset, rash, hives, yeast infections, and in rare cases, colitis.
Cimetidine Counseling:  I discussed with the patient the risks of Cimetidine including but not limited to gynecomastia, headache, diarrhea, nausea, drowsiness, arrhythmias, pancreatitis, skin rashes, psychosis, bone marrow suppression and kidney toxicity.
Benzoyl Peroxide Counseling: Patient counseled that medicine may cause skin irritation and bleach clothing.  In the event of skin irritation, the patient was advised to reduce the amount of the drug applied or use it less frequently.   The patient verbalized understanding of the proper use and possible adverse effects of benzoyl peroxide.  All of the patient's questions and concerns were addressed.
Picato Counseling:  I discussed with the patient the risks of Picato including but not limited to erythema, scaling, itching, weeping, crusting, and pain.
Rifampin Pregnancy And Lactation Text: This medication is Pregnancy Category C and it isn't know if it is safe during pregnancy. It is also excreted in breast milk and should not be used if you are breast feeding.
Clindamycin Pregnancy And Lactation Text: This medication can be used in pregnancy if certain situations. Clindamycin is also present in breast milk.
Arava Counseling:  Patient counseled regarding adverse effects of Arava including but not limited to nausea, vomiting, abnormalities in liver function tests. Patients may develop mouth sores, rash, diarrhea, and abnormalities in blood counts. The patient understands that monitoring is required including LFTs and blood counts.  There is a rare possibility of scarring of the liver and lung problems that can occur when taking methotrexate. Persistent nausea, loss of appetite, pale stools, dark urine, cough, and shortness of breath should be reported immediately. Patient advised to discontinue Arava treatment and consult with a physician prior to attempting conception. The patient will have to undergo a treatment to eliminate Arava from the body prior to conception.
Cosentyx Counseling:  I discussed with the patient the risks of Cosentyx including but not limited to worsening of Crohn's disease, immunosuppression, allergic reactions and infections.  The patient understands that monitoring is required including a PPD at baseline and must alert us or the primary physician if symptoms of infection or other concerning signs are noted.
Cyclophosphamide Pregnancy And Lactation Text: This medication is Pregnancy Category D and it isn't considered safe during pregnancy. This medication is excreted in breast milk.
Skyrizi Counseling: I discussed with the patient the risks of risankizumab-rzaa including but not limited to immunosuppression, and serious infections.  The patient understands that monitoring is required including a PPD at baseline and must alert us or the primary physician if symptoms of infection or other concerning signs are noted.
Cyclosporine Counseling:  I discussed with the patient the risks of cyclosporine including but not limited to hypertension, gingival hyperplasia,myelosuppression, immunosuppression, liver damage, kidney damage, neurotoxicity, lymphoma, and serious infections. The patient understands that monitoring is required including baseline blood pressure, CBC, CMP, lipid panel and uric acid, and then 1-2 times monthly CMP and blood pressure.
Dapsone Counseling: I discussed with the patient the risks of dapsone including but not limited to hemolytic anemia, agranulocytosis, rashes, methemoglobinemia, kidney failure, peripheral neuropathy, headaches, GI upset, and liver toxicity.  Patients who start dapsone require monitoring including baseline LFTs and weekly CBCs for the first month, then every month thereafter.  The patient verbalized understanding of the proper use and possible adverse effects of dapsone.  All of the patient's questions and concerns were addressed.
Sarecycline Counseling: Patient advised regarding possible photosensitivity and discoloration of the teeth, skin, lips, tongue and gums.  Patient instructed to avoid sunlight, if possible.  When exposed to sunlight, patients should wear protective clothing, sunglasses, and sunscreen.  The patient was instructed to call the office immediately if the following severe adverse effects occur:  hearing changes, easy bruising/bleeding, severe headache, or vision changes.  The patient verbalized understanding of the proper use and possible adverse effects of sarecycline.  All of the patient's questions and concerns were addressed.
Benzoyl Peroxide Pregnancy And Lactation Text: This medication is Pregnancy Category C. It is unknown if benzoyl peroxide is excreted in breast milk.
Carac Counseling:  I discussed with the patient the risks of Carac including but not limited to erythema, scaling, itching, weeping, crusting, and pain.
Birth Control Pills Counseling: Birth Control Pill Counseling: I discussed with the patient the potential side effects of OCPs including but not limited to increased risk of stroke, heart attack, thrombophlebitis, deep venous thrombosis, hepatic adenomas, breast changes, GI upset, headaches, and depression.  The patient verbalized understanding of the proper use and possible adverse effects of OCPs. All of the patient's questions and concerns were addressed.
Dupixent Counseling: I discussed with the patient the risks of dupilumab including but not limited to eye infection and irritation, cold sores, injection site reactions, worsening of asthma, allergic reactions and increased risk of parasitic infection.  Live vaccines should be avoided while taking dupilumab. Dupilumab will also interact with certain medications such as warfarin and cyclosporine. The patient understands that monitoring is required and they must alert us or the primary physician if symptoms of infection or other concerning signs are noted.
Doxycycline Counseling:  Patient counseled regarding possible photosensitivity and increased risk for sunburn.  Patient instructed to avoid sunlight, if possible.  When exposed to sunlight, patients should wear protective clothing, sunglasses, and sunscreen.  The patient was instructed to call the office immediately if the following severe adverse effects occur:  hearing changes, easy bruising/bleeding, severe headache, or vision changes.  The patient verbalized understanding of the proper use and possible adverse effects of doxycycline.  All of the patient's questions and concerns were addressed.
Clofazimine Counseling:  I discussed with the patient the risks of clofazimine including but not limited to skin and eye pigmentation, liver damage, nausea/vomiting, gastrointestinal bleeding and allergy.
Protopic Counseling: Patient may experience a mild burning sensation during topical application. Protopic is not approved in children less than 2 years of age. There have been case reports of hematologic and skin malignancies in patients using topical calcineurin inhibitors although causality is questionable.
Sarecycline Pregnancy And Lactation Text: This medication is Pregnancy Category D and not consider safe during pregnancy. It is also excreted in breast milk.
Doxycycline Pregnancy And Lactation Text: This medication is Pregnancy Category D and not consider safe during pregnancy. It is also excreted in breast milk but is considered safe for shorter treatment courses.
Stelara Counseling:  I discussed with the patient the risks of ustekinumab including but not limited to immunosuppression, malignancy, posterior leukoencephalopathy syndrome, and serious infections.  The patient understands that monitoring is required including a PPD at baseline and must alert us or the primary physician if symptoms of infection or other concerning signs are noted.
Dapsone Pregnancy And Lactation Text: This medication is Pregnancy Category C and is not considered safe during pregnancy or breast feeding.
Clofazimine Pregnancy And Lactation Text: This medication is Pregnancy Category C and isn't considered safe during pregnancy. It is excreted in breast milk.
Dupixent Pregnancy And Lactation Text: This medication likely crosses the placenta but the risk for the fetus is uncertain. This medication is excreted in breast milk.
Carac Pregnancy And Lactation Text: This medication is Pregnancy Category X and contraindicated in pregnancy and in women who may become pregnant. It is unknown if this medication is excreted in breast milk.
Cyclosporine Pregnancy And Lactation Text: This medication is Pregnancy Category C and it isn't know if it is safe during pregnancy. This medication is excreted in breast milk.
Doxepin Counseling:  Patient advised that the medication is sedating and not to drive a car after taking this medication. Patient informed of potential adverse effects including but not limited to dry mouth, urinary retention, and blurry vision.  The patient verbalized understanding of the proper use and possible adverse effects of doxepin.  All of the patient's questions and concerns were addressed.
Birth Control Pills Pregnancy And Lactation Text: This medication should be avoided if pregnant and for the first 30 days post-partum.
Tetracycline Counseling: Patient counseled regarding possible photosensitivity and increased risk for sunburn.  Patient instructed to avoid sunlight, if possible.  When exposed to sunlight, patients should wear protective clothing, sunglasses, and sunscreen.  The patient was instructed to call the office immediately if the following severe adverse effects occur:  hearing changes, easy bruising/bleeding, severe headache, or vision changes.  The patient verbalized understanding of the proper use and possible adverse effects of tetracycline.  All of the patient's questions and concerns were addressed. Patient understands to avoid pregnancy while on therapy due to potential birth defects.
Doxepin Pregnancy And Lactation Text: This medication is Pregnancy Category C and it isn't known if it is safe during pregnancy. It is also excreted in breast milk and breast feeding isn't recommended.
Spironolactone Counseling: Patient advised regarding risks of diarrhea, abdominal pain, hyperkalemia, birth defects (for female patients), liver toxicity and renal toxicity. The patient may need blood work to monitor liver and kidney function and potassium levels while on therapy. The patient verbalized understanding of the proper use and possible adverse effects of spironolactone.  All of the patient's questions and concerns were addressed.
Erivedge Counseling- I discussed with the patient the risks of Erivedge including but not limited to nausea, vomiting, diarrhea, constipation, weight loss, changes in the sense of taste, decreased appetite, muscle spasms, and hair loss.  The patient verbalized understanding of the proper use and possible adverse effects of Erivedge.  All of the patient's questions and concerns were addressed.
Protopic Pregnancy And Lactation Text: This medication is Pregnancy Category C. It is unknown if this medication is excreted in breast milk when applied topically.
Taltz Counseling: I discussed with the patient the risks of ixekizumab including but not limited to immunosuppression, serious infections, worsening of inflammatory bowel disease and drug reactions.  The patient understands that monitoring is required including a PPD at baseline and must alert us or the primary physician if symptoms of infection or other concerning signs are noted.
5-Fu Counseling: 5-Fluorouracil Counseling:  I discussed with the patient the risks of 5-fluorouracil including but not limited to erythema, scaling, itching, weeping, crusting, and pain.
Enbrel Counseling:  I discussed with the patient the risks of etanercept including but not limited to myelosuppression, immunosuppression, autoimmune hepatitis, demyelinating diseases, lymphoma, and infections.  The patient understands that monitoring is required including a PPD at baseline and must alert us or the primary physician if symptoms of infection or other concerning signs are noted.
Colchicine Counseling:  Patient counseled regarding adverse effects including but not limited to stomach upset (nausea, vomiting, stomach pain, or diarrhea).  Patient instructed to limit alcohol consumption while taking this medication.  Colchicine may reduce blood counts especially with prolonged use.  The patient understands that monitoring of kidney function and blood counts may be required, especially at baseline. The patient verbalized understanding of the proper use and possible adverse effects of colchicine.  All of the patient's questions and concerns were addressed.
Methotrexate Counseling:  Patient counseled regarding adverse effects of methotrexate including but not limited to nausea, vomiting, abnormalities in liver function tests. Patients may develop mouth sores, rash, diarrhea, and abnormalities in blood counts. The patient understands that monitoring is required including LFT's and blood counts.  There is a rare possibility of scarring of the liver and lung problems that can occur when taking methotrexate. Persistent nausea, loss of appetite, pale stools, dark urine, cough, and shortness of breath should be reported immediately. Patient advised to discontinue methotrexate treatment at least three months before attempting to become pregnant.  I discussed the need for folate supplements while taking methotrexate.  These supplements can decrease side effects during methotrexate treatment. The patient verbalized understanding of the proper use and possible adverse effects of methotrexate.  All of the patient's questions and concerns were addressed.
Erythromycin Counseling:  I discussed with the patient the risks of erythromycin including but not limited to GI upset, allergic reaction, drug rash, diarrhea, increase in liver enzymes, and yeast infections.
Spironolactone Pregnancy And Lactation Text: This medication can cause feminization of the male fetus and should be avoided during pregnancy. The active metabolite is also found in breast milk.
Erythromycin Pregnancy And Lactation Text: This medication is Pregnancy Category B and is considered safe during pregnancy. It is also excreted in breast milk.
Methotrexate Pregnancy And Lactation Text: This medication is Pregnancy Category X and is known to cause fetal harm. This medication is excreted in breast milk.
Hydroxyzine Counseling: Patient advised that the medication is sedating and not to drive a car after taking this medication.  Patient informed of potential adverse effects including but not limited to dry mouth, urinary retention, and blurry vision.  The patient verbalized understanding of the proper use and possible adverse effects of hydroxyzine.  All of the patient's questions and concerns were addressed.
Solaraze Counseling:  I discussed with the patient the risks of Solaraze including but not limited to erythema, scaling, itching, weeping, crusting, and pain.
Solaraze Pregnancy And Lactation Text: This medication is Pregnancy Category B and is considered safe. There is some data to suggest avoiding during the third trimester. It is unknown if this medication is excreted in breast milk.
Hydroxyzine Pregnancy And Lactation Text: This medication is not safe during pregnancy and should not be taken. It is also excreted in breast milk and breast feeding isn't recommended.
Metronidazole Counseling:  I discussed with the patient the risks of metronidazole including but not limited to seizures, nausea/vomiting, a metallic taste in the mouth, nausea/vomiting and severe allergy.
Humira Counseling:  I discussed with the patient the risks of adalimumab including but not limited to myelosuppression, immunosuppression, autoimmune hepatitis, demyelinating diseases, lymphoma, and serious infections.  The patient understands that monitoring is required including a PPD at baseline and must alert us or the primary physician if symptoms of infection or other concerning signs are noted.
Prednisone Counseling:  I discussed with the patient the risks of prolonged use of prednisone including but not limited to weight gain, insomnia, osteoporosis, mood changes, diabetes, susceptibility to infection, glaucoma and high blood pressure.  In cases where prednisone use is prolonged, patients should be monitored with blood pressure checks, serum glucose levels and an eye exam.  Additionally, the patient may need to be placed on GI prophylaxis, PCP prophylaxis, and calcium and vitamin D supplementation and/or a bisphosphonate.  The patient verbalized understanding of the proper use and the possible adverse effects of prednisone.  All of the patient's questions and concerns were addressed.
Gabapentin Counseling: I discussed with the patient the risks of gabapentin including but not limited to dizziness, somnolence, fatigue and ataxia.
Tremfya Counseling: I discussed with the patient the risks of guselkumab including but not limited to immunosuppression, serious infections, worsening of inflammatory bowel disease and drug reactions.  The patient understands that monitoring is required including a PPD at baseline and must alert us or the primary physician if symptoms of infection or other concerning signs are noted.
SSKI Counseling:  I discussed with the patient the risks of SSKI including but not limited to thyroid abnormalities, metallic taste, GI upset, fever, headache, acne, arthralgias, paraesthesias, lymphadenopathy, easy bleeding, arrhythmias, and allergic reaction.
Drysol Counseling:  I discussed with the patient the risks of drysol/aluminum chloride including but not limited to skin rash, itching, irritation, burning.
Drysol Pregnancy And Lactation Text: This medication is considered safe during pregnancy and breast feeding.
Sski Pregnancy And Lactation Text: This medication is Pregnancy Category D and isn't considered safe during pregnancy. It is excreted in breast milk.
Topical Retinoid counseling:  Patient advised to apply a pea-sized amount only at bedtime and wait 30 minutes after washing their face before applying.  If too drying, patient may add a non-comedogenic moisturizer. The patient verbalized understanding of the proper use and possible adverse effects of retinoids.  All of the patient's questions and concerns were addressed.
Fluconazole Counseling:  Patient counseled regarding adverse effects of fluconazole including but not limited to headache, diarrhea, nausea, upset stomach, liver function test abnormalities, taste disturbance, and stomach pain.  There is a rare possibility of liver failure that can occur when taking fluconazole.  The patient understands that monitoring of LFTs and kidney function test may be required, especially at baseline. The patient verbalized understanding of the proper use and possible adverse effects of fluconazole.  All of the patient's questions and concerns were addressed.
Metronidazole Pregnancy And Lactation Text: This medication is Pregnancy Category B and considered safe during pregnancy.  It is also excreted in breast milk.
Glycopyrrolate Counseling:  I discussed with the patient the risks of glycopyrrolate including but not limited to skin rash, drowsiness, dry mouth, difficulty urinating, and blurred vision.
Minocycline Counseling: Patient advised regarding possible photosensitivity and discoloration of the teeth, skin, lips, tongue and gums.  Patient instructed to avoid sunlight, if possible.  When exposed to sunlight, patients should wear protective clothing, sunglasses, and sunscreen.  The patient was instructed to call the office immediately if the following severe adverse effects occur:  hearing changes, easy bruising/bleeding, severe headache, or vision changes.  The patient verbalized understanding of the proper use and possible adverse effects of minocycline.  All of the patient's questions and concerns were addressed.
Elidel Counseling: Patient may experience a mild burning sensation during topical application. Elidel is not approved in children less than 2 years of age. There have been case reports of hematologic and skin malignancies in patients using topical calcineurin inhibitors although causality is questionable.
Albendazole Counseling:  I discussed with the patient the risks of albendazole including but not limited to cytopenia, kidney damage, nausea/vomiting and severe allergy.  The patient understands that this medication is being used in an off-label manner.
Thalidomide Counseling: I discussed with the patient the risks of thalidomide including but not limited to birth defects, anxiety, weakness, chest pain, dizziness, cough and severe allergy.
Tazorac Counseling:  Patient advised that medication is irritating and drying.  Patient may need to apply sparingly and wash off after an hour before eventually leaving it on overnight.  The patient verbalized understanding of the proper use and possible adverse effects of tazorac.  All of the patient's questions and concerns were addressed.
Glycopyrrolate Pregnancy And Lactation Text: This medication is Pregnancy Category B and is considered safe during pregnancy. It is unknown if it is excreted breast milk.
Ilumya Counseling: I discussed with the patient the risks of tildrakizumab including but not limited to immunosuppression, malignancy, posterior leukoencephalopathy syndrome, and serious infections.  The patient understands that monitoring is required including a PPD at baseline and must alert us or the primary physician if symptoms of infection or other concerning signs are noted.
Xeljanz Counseling: I discussed with the patient the risks of Xeljanz therapy including increased risk of infection, liver issues, headache, diarrhea, or cold symptoms. Live vaccines should be avoided. They were instructed to call if they have any problems.
Xelnickyz Pregnancy And Lactation Text: This medication is Pregnancy Category D and is not considered safe during pregnancy.  The risk during breast feeding is also uncertain.
Acitretin Counseling:  I discussed with the patient the risks of acitretin including but not limited to hair loss, dry lips/skin/eyes, liver damage, hyperlipidemia, depression/suicidal ideation, photosensitivity.  Serious rare side effects can include but are not limited to pancreatitis, pseudotumor cerebri, bony changes, clot formation/stroke/heart attack.  Patient understands that alcohol is contraindicated since it can result in liver toxicity and significantly prolong the elimination of the drug by many years.
Griseofulvin Counseling:  I discussed with the patient the risks of griseofulvin including but not limited to photosensitivity, cytopenia, liver damage, nausea/vomiting and severe allergy.  The patient understands that this medication is best absorbed when taken with a fatty meal (e.g., ice cream or french fries).
Hydroxychloroquine Counseling:  I discussed with the patient that a baseline ophthalmologic exam is needed at the start of therapy and every year thereafter while on therapy. A CBC may also be warranted for monitoring.  The side effects of this medication were discussed with the patient, including but not limited to agranulocytosis, aplastic anemia, seizures, rashes, retinopathy, and liver toxicity. Patient instructed to call the office should any adverse effect occur.  The patient verbalized understanding of the proper use and possible adverse effects of Plaquenil.  All the patient's questions and concerns were addressed.
Acitretin Pregnancy And Lactation Text: This medication is Pregnancy Category X and should not be given to women who are pregnant or may become pregnant in the future. This medication is excreted in breast milk.
Valtrex Counseling: I discussed with the patient the risks of valacyclovir including but not limited to kidney damage, nausea, vomiting and severe allergy.  The patient understands that if the infection seems to be worsening or is not improving, they are to call.
Ivermectin Counseling:  Patient instructed to take medication on an empty stomach with a full glass of water.  Patient informed of potential adverse effects including but not limited to nausea, diarrhea, dizziness, itching, and swelling of the extremities or lymph nodes.  The patient verbalized understanding of the proper use and possible adverse effects of ivermectin.  All of the patient's questions and concerns were addressed.
Tazorac Pregnancy And Lactation Text: This medication is not safe during pregnancy. It is unknown if this medication is excreted in breast milk.
Topical Clindamycin Counseling: Patient counseled that this medication may cause skin irritation or allergic reactions.  In the event of skin irritation, the patient was advised to reduce the amount of the drug applied or use it less frequently.   The patient verbalized understanding of the proper use and possible adverse effects of clindamycin.  All of the patient's questions and concerns were addressed.
Eucrisa Counseling: Patient may experience a mild burning sensation during topical application. Eucrisa is not approved in children less than 2 years of age.
Quinolones Counseling:  I discussed with the patient the risks of fluoroquinolones including but not limited to GI upset, allergic reaction, drug rash, diarrhea, dizziness, photosensitivity, yeast infections, liver function test abnormalities, tendonitis/tendon rupture.
Infliximab Counseling:  I discussed with the patient the risks of infliximab including but not limited to myelosuppression, immunosuppression, autoimmune hepatitis, demyelinating diseases, lymphoma, and serious infections.  The patient understands that monitoring is required including a PPD at baseline and must alert us or the primary physician if symptoms of infection or other concerning signs are noted.
Griseofulvin Pregnancy And Lactation Text: This medication is Pregnancy Category X and is known to cause serious birth defects. It is unknown if this medication is excreted in breast milk but breast feeding should be avoided.
Finasteride Pregnancy And Lactation Text: This medication is absolutely contraindicated during pregnancy. It is unknown if it is excreted in breast milk.
Rhofade Pregnancy And Lactation Text: This medication has not been assigned a Pregnancy Risk Category. It is unknown if the medication is excreted in breast milk.
Dutasteride Pregnancy And Lactation Text: This medication is absolutely contraindicated in women, especially during pregnancy and breast feeding. Feminization of male fetuses is possible if taking while pregnant.
Opioid Pregnancy And Lactation Text: These medications can lead to premature delivery and should be avoided during pregnancy. These medications are also present in breast milk in small amounts.
Calcipotriene Pregnancy And Lactation Text: This medication has not been proven safe during pregnancy. It is unknown if this medication is excreted in breast milk.
Tranexamic Acid Counseling:  Patient advised of the small risk of bleeding problems with tranexamic acid. They were also instructed to call if they developed any nausea, vomiting or diarrhea. All of the patient's questions and concerns were addressed.
Propranolol Counseling:  I discussed with the patient the risks of propranolol including but not limited to low heart rate, low blood pressure, low blood sugar, restlessness and increased cold sensitivity. They should call the office if they experience any of these side effects.
Calcipotriene Counseling:  I discussed with the patient the risks of calcipotriene including but not limited to erythema, scaling, itching, and irritation.
Winlevi Pregnancy And Lactation Text: This medication is considered safe during pregnancy and breastfeeding.
Azelaic Acid Counseling: Patient counseled that medicine may cause skin irritation and to avoid applying near the eyes.  In the event of skin irritation, the patient was advised to reduce the amount of the drug applied or use it less frequently.   The patient verbalized understanding of the proper use and possible adverse effects of azelaic acid.  All of the patient's questions and concerns were addressed.
Libtayo Pregnancy And Lactation Text: This medication is contraindicated in pregnancy and when breast feeding.
Propranolol Pregnancy And Lactation Text: This medication is Pregnancy Category C and it isn't known if it is safe during pregnancy. It is excreted in breast milk.
Opioid Counseling: I discussed with the patient the potential side effects of opioids including but not limited to addiction, altered mental status, and depression. I stressed avoiding alcohol, benzodiazepines, muscle relaxants and sleep aids unless specifically okayed by a physician. The patient verbalized understanding of the proper use and possible adverse effects of opioids. All of the patient's questions and concerns were addressed. They were instructed to flush the remaining pills down the toilet if they did not need them for pain.
Tranexamic Acid Pregnancy And Lactation Text: It is unknown if this medication is safe during pregnancy or breast feeding.
Topical Ketoconazole Counseling: Patient counseled that this medication may cause skin irritation or allergic reactions.  In the event of skin irritation, the patient was advised to reduce the amount of the drug applied or use it less frequently.   The patient verbalized understanding of the proper use and possible adverse effects of ketoconazole.  All of the patient's questions and concerns were addressed.
Wartpeel Counseling:  I discussed with the patient the risks of Wartpeel including but not limited to erythema, scaling, itching, weeping, crusting, and pain.
Oral Minoxidil Pregnancy And Lactation Text: This medication should only be used when clearly needed if you are pregnant, attempting to become pregnant or breast feeding.
Niacinamide Pregnancy And Lactation Text: These medications are considered safe during pregnancy.
Dutasteride Male Counseling: Dustasteride Counseling:  I discussed with the patient the risks of use of dutasteride including but not limited to decreased libido, decreased ejaculate volume, and gynecomastia. Women who can become pregnant should not handle medication.  All of the patient's questions and concerns were addressed.
Mirvaso Counseling: Mirvaso is a topical medication which can decrease superficial blood flow where applied. Side effects are uncommon and include stinging, redness and allergic reactions.
Rhofade Counseling: Rhofade is a topical medication which can decrease superficial blood flow where applied. Side effects are uncommon and include stinging, redness and allergic reactions.
Oral Minoxidil Counseling- I discussed with the patient the risks of oral minoxidil including but not limited to shortness of breath, swelling of the feet or ankles, dizziness, lightheadedness, unwanted hair growth and allergic reaction.  The patient verbalized understanding of the proper use and possible adverse effects of oral minoxidil.  All of the patient's questions and concerns were addressed.
Winlevi Counseling:  I discussed with the patient the risks of topical clascoterone including but not limited to erythema, scaling, itching, and stinging. Patient voiced their understanding.
Niacinamide Counseling: I recommended taking niacin or niacinamide, also know as vitamin B3, twice daily. Recent evidence suggests that taking vitamin B3 (500 mg twice daily) can reduce the risk of actinic keratoses and non-melanoma skin cancers. Side effects of vitamin B3 include flushing and headache.
Finasteride Male Counseling: Finasteride Counseling:  I discussed with the patient the risks of use of finasteride including but not limited to decreased libido, decreased ejaculate volume, gynecomastia, and depression. Women should not handle medication.  All of the patient's questions and concerns were addressed.
Libtayo Counseling- I discussed with the patient the risks of Libtayo including but not limited to nausea, vomiting, diarrhea, and bone or muscle pain.  The patient verbalized understanding of the proper use and possible adverse effects of Libtayo.  All of the patient's questions and concerns were addressed.
Rinvoq Counseling: I discussed with the patient the risks of Rinvoq therapy including but not limited to upper respiratory tract infections, shingles, cold sores, bronchitis, nausea, cough, fever, acne, and headache. Live vaccines should be avoided.  This medication has been linked to serious infections; higher rate of mortality; malignancy and lymphoproliferative disorders; major adverse cardiovascular events; thrombosis; thrombocytopenia, anemia, and neutropenia; lipid elevations; liver enzyme elevations; and gastrointestinal perforations.
Rinvoq Pregnancy And Lactation Text: Based on animal studies, Rinvoq may cause embryo-fetal harm when administered to pregnant women.  The medication should not be used in pregnancy.  Breastfeeding is not recommended during treatment and for 6 days after the last dose.
Sotyktu Counseling:  I discussed the most common side effects of Sotyktu including: common cold, sore throat, sinus infections, cold sores, canker sores, folliculitis, and acne.? I also discussed more serious side effects of Sotyktu including but not limited to: serious allergic reactions; increased risk for infections such as TB; cancers such as lymphomas; rhabdomyolysis and elevated CPK; and elevated triglycerides and liver enzymes.?
Sotyktu Pregnancy And Lactation Text: There is insufficient data to evaluate whether or not Sotyktu is safe to use during pregnancy.? ?It is not known if Sotyktu passes into breast milk and whether or not it is safe to use when breastfeeding.??
Olanzapine Counseling- I discussed with the patient the common side effects of olanzapine including but are not limited to: lack of energy, dry mouth, increased appetite, sleepiness, tremor, constipation, dizziness, changes in behavior, or restlessness.  Explained that teenagers are more likely to experience headaches, abdominal pain, pain in the arms or legs, tiredness, and sleepiness.  Serious side effects include but are not limited: increased risk of death in elderly patients who are confused, have memory loss, or dementia-related psychosis; hyperglycemia; increased cholesterol and triglycerides; and weight gain.
Olanzapine Pregnancy And Lactation Text: This medication is pregnancy category C.   There are no adequate and well controlled trials with olanzapine in pregnant females.  Olanzapine should be used during pregnancy only if the potential benefit justifies the potential risk to the fetus.   In a study in lactating healthy women, olanzapine was excreted in breast milk.  It is recommended that women taking olanzapine should not breast feed.
Cibinqo Counseling: I discussed with the patient the risks of Cibinqo therapy including but not limited to common cold, nausea, headache, cold sores, increased blood CPK levels, dizziness, UTIs, fatigue, acne, and vomitting. Live vaccines should be avoided.  This medication has been linked to serious infections; higher rate of mortality; malignancy and lymphoproliferative disorders; major adverse cardiovascular events; thrombosis; thrombocytopenia and lymphopenia; lipid elevations; and retinal detachment.
Opzelura Counseling:  I discussed with the patient the risks of Opzelura including but not limited to nasopharngitis, bronchitis, ear infection, eosinophila, hives, diarrhea, folliculitis, tonsillitis, and rhinorrhea.  Taken orally, this medication has been linked to serious infections; higher rate of mortality; malignancy and lymphoproliferative disorders; major adverse cardiovascular events; thrombosis; thrombocytopenia, anemia, and neutropenia; and lipid elevations.
Adbry Counseling: I discussed with the patient the risks of tralokinumab including but not limited to eye infection and irritation, cold sores, injection site reactions, worsening of asthma, allergic reactions and increased risk of parasitic infection.  Live vaccines should be avoided while taking tralokinumab. The patient understands that monitoring is required and they must alert us or the primary physician if symptoms of infection or other concerning signs are noted.
Cibinqo Pregnancy And Lactation Text: It is unknown if this medication will adversely affect pregnancy or breast feeding.  You should not take this medication if you are currently pregnant or planning a pregnancy or while breastfeeding.
Opzelura Pregnancy And Lactation Text: There is insufficient data to evaluate drug-associated risk for major birth defects, miscarriage, or other adverse maternal or fetal outcomes.  There is a pregnancy registry that monitors pregnancy outcomes in pregnant persons exposed to the medication during pregnancy.  It is unknown if this medication is excreted in breast milk.  Do not breastfeed during treatment and for about 4 weeks after the last dose.
Adbry Pregnancy And Lactation Text: It is unknown if this medication will adversely affect pregnancy or breast feeding.
Low Dose Naltrexone Counseling- I discussed with the patient the potential risks and side effects of low dose naltrexone including but not limited to: more vivid dreams, headaches, nausea, vomiting, abdominal pain, fatigue, dizziness, and anxiety.
Olumiant Counseling: I discussed with the patient the risks of Olumiant therapy including but not limited to upper respiratory tract infections, shingles, cold sores, and nausea. Live vaccines should be avoided.  This medication has been linked to serious infections; higher rate of mortality; malignancy and lymphoproliferative disorders; major adverse cardiovascular events; thrombosis; gastrointestinal perforations; neutropenia; lymphopenia; anemia; liver enzyme elevations; and lipid elevations.
Olumiant Pregnancy And Lactation Text: Based on animal studies, Olumiant may cause embryo-fetal harm when administered to pregnant women.  The medication should not be used in pregnancy.  Breastfeeding is not recommended during treatment.
Low Dose Naltrexone Pregnancy And Lactation Text: Naltrexone is pregnancy category C.  There have been no adequate and well-controlled studies in pregnant women.  It should be used in pregnancy only if the potential benefit justifies the potential risk to the fetus.   Limited data indicates that naltrexone is minimally excreted into breastmilk.

## 2024-01-15 ENCOUNTER — RX ONLY (OUTPATIENT)
Age: 59
Setting detail: RX ONLY
End: 2024-01-15

## 2024-01-15 RX ORDER — FOLIC ACID 1 MG/1
TABLET ORAL
Qty: 90 | Refills: 3 | Status: ERX

## 2024-01-26 ENCOUNTER — APPOINTMENT (RX ONLY)
Dept: URBAN - METROPOLITAN AREA CLINIC 125 | Facility: CLINIC | Age: 59
Setting detail: DERMATOLOGY
End: 2024-01-26

## 2024-01-26 DIAGNOSIS — L60.3 NAIL DYSTROPHY: ICD-10-CM

## 2024-01-26 PROBLEM — L60.9 NAIL DISORDER, UNSPECIFIED: Status: ACTIVE | Noted: 2024-01-26

## 2024-01-26 PROCEDURE — ? PRESCRIPTION MEDICATION MANAGEMENT

## 2024-01-26 PROCEDURE — 99213 OFFICE O/P EST LOW 20 MIN: CPT

## 2024-01-26 PROCEDURE — ? ADDITIONAL NOTES

## 2024-01-26 PROCEDURE — ? COUNSELING

## 2024-01-26 ASSESSMENT — LOCATION SIMPLE DESCRIPTION DERM: LOCATION SIMPLE: LEFT GREAT TOE

## 2024-01-26 ASSESSMENT — LOCATION ZONE DERM: LOCATION ZONE: TOE

## 2024-01-26 ASSESSMENT — LOCATION DETAILED DESCRIPTION DERM: LOCATION DETAILED: LEFT LATERAL GREAT TOE

## 2024-01-26 NOTE — PROCEDURE: ADDITIONAL NOTES
Render Risk Assessment In Note?: no
Detail Level: Simple
Additional Notes: Dog hair stuck under cracked area of toenail. Debris (dog hair) was removed successfully from under nail

## 2024-01-26 NOTE — PROCEDURE: PRESCRIPTION MEDICATION MANAGEMENT
Plan: Patient has experienced a horizontal split of the nail.  Nail is thickened but soft proximally. Advised she keep cut distally and allow nail to cover bed as long as it will stay intact (if not causing her discomfort)  If it falls off on its own that is ok.  Jublia will easily penetrate crack in nail and treat underlying nail fungus.  She should see growth of nail from the proximal growth area pushing off loose nail.  She has follow up scheduled with Dr Lane in a few weeks for reeval
Detail Level: Zone
Render In Strict Bullet Format?: No
Continue Regimen: Jublia 10 % topical solution with applicator : QD AAA left great toenail. Clean once a week with rubbing alcohol.

## 2024-02-06 ENCOUNTER — APPOINTMENT (RX ONLY)
Dept: URBAN - METROPOLITAN AREA CLINIC 125 | Facility: CLINIC | Age: 59
Setting detail: DERMATOLOGY
End: 2024-02-06

## 2024-02-06 DIAGNOSIS — L20.89 OTHER ATOPIC DERMATITIS: ICD-10-CM | Status: STABLE

## 2024-02-06 DIAGNOSIS — Z79.899 OTHER LONG TERM (CURRENT) DRUG THERAPY: ICD-10-CM

## 2024-02-06 DIAGNOSIS — L60.3 NAIL DYSTROPHY: ICD-10-CM | Status: IMPROVED

## 2024-02-06 PROBLEM — L60.9 NAIL DISORDER, UNSPECIFIED: Status: ACTIVE | Noted: 2024-02-06

## 2024-02-06 PROCEDURE — ? ADDITIONAL NOTES

## 2024-02-06 PROCEDURE — ? METHOTREXATE MONITORING

## 2024-02-06 PROCEDURE — ? COUNSELING

## 2024-02-06 PROCEDURE — ? TREATMENT REGIMEN

## 2024-02-06 PROCEDURE — ? HIGH RISK MEDICATION MONITORING

## 2024-02-06 PROCEDURE — ? PRESCRIPTION MEDICATION MANAGEMENT

## 2024-02-06 PROCEDURE — ? CHRONOLOGY OF PRESENT ILLNESS

## 2024-02-06 PROCEDURE — ? ORDER TESTS

## 2024-02-06 PROCEDURE — 99214 OFFICE O/P EST MOD 30 MIN: CPT

## 2024-02-06 ASSESSMENT — LOCATION DETAILED DESCRIPTION DERM
LOCATION DETAILED: LEFT VENTRAL PROXIMAL FOREARM
LOCATION DETAILED: LEFT MEDIAL FRONTAL SCALP
LOCATION DETAILED: RIGHT CYMBA CONCHA
LOCATION DETAILED: RIGHT ULNAR DORSAL HAND
LOCATION DETAILED: LEFT INFERIOR CRUS OF ANTIHELIX
LOCATION DETAILED: RIGHT VENTRAL PROXIMAL FOREARM
LOCATION DETAILED: LEFT AXILLARY VAULT
LOCATION DETAILED: LEFT LATERAL GREAT TOE
LOCATION DETAILED: LEFT RADIAL DORSAL HAND

## 2024-02-06 ASSESSMENT — LOCATION ZONE DERM
LOCATION ZONE: ARM
LOCATION ZONE: SCALP
LOCATION ZONE: HAND
LOCATION ZONE: AXILLAE
LOCATION ZONE: EAR
LOCATION ZONE: TOE

## 2024-02-06 ASSESSMENT — BSA ECZEMA: % BODY COVERED IN ECZEMA: 1

## 2024-02-06 ASSESSMENT — LOCATION SIMPLE DESCRIPTION DERM
LOCATION SIMPLE: LEFT EAR
LOCATION SIMPLE: RIGHT HAND
LOCATION SIMPLE: LEFT HAND
LOCATION SIMPLE: RIGHT EAR
LOCATION SIMPLE: LEFT GREAT TOE
LOCATION SIMPLE: LEFT SCALP
LOCATION SIMPLE: LEFT AXILLARY VAULT
LOCATION SIMPLE: LEFT FOREARM
LOCATION SIMPLE: RIGHT FOREARM

## 2024-02-06 ASSESSMENT — SEVERITY ASSESSMENT 2020: SEVERITY 2020: ALMOST CLEAR

## 2024-02-06 NOTE — PROCEDURE: METHOTREXATE MONITORING
Add Additional Dosage: No
Dosage 4 (Mg/Week): 0
Dosage 2 (Mg/Week): 17.5
Dosage 3 (Mg/Week): 20
Detail Level: Zone
Dosage 1 (Mg/Week): 15
Date Dosage 3 Started: 05/23/2023
Current Dosage: 17.5mg/week
Date Dosage 1 Started: 01/24/2023
Date Dosage 2 Started: 04/21/2023
Calculate Cumulative Dosage Automatically?: Yes
Comments: start 1/24/2023
Most Recent Cbc (Optional): 1/30/24

## 2024-02-06 NOTE — PROCEDURE: PRESCRIPTION MEDICATION MANAGEMENT
Render In Strict Bullet Format?: No
Detail Level: Zone
Continue Regimen: gabapentin 300 mg capsule: Take one capsule PO 1 h before bedtime x 1 day, then take one capsule PO BID x 1 day, then take one capsule PO TID for maintenance (pt only tolerating 2 PO QD)\\nmethotrexate sodium 2.5 mg tablet: Take 4 tablets PO BID every Friday\\nfolic acid 1 mg tablet: Take 1 PO QD\\nOlux 0.05% foam BID PRN flares (ears)\\nKetoconazole 2% shampoo BIW-[id number]\\nKetoconazole 2% cream BID to ears
Plan: Advised pt to file down the raised areas of the nail.
Continue Regimen: Jublia 10 % topical solution with applicator : QD AAA left great toenail. Clean once a week with rubbing alcohol.

## 2024-02-06 NOTE — PROCEDURE: CHRONOLOGY OF PRESENT ILLNESS
Chronology Of Present Illness: 8/8/2019:  Rash, located on the right ear and scalp. The rash is scaly, red, and itchy. The rash has been present for years. She has arthritis. She is not currently on any medications. She was started on Olux foam and ketoconazole cream.\\n11/17/2019: scalp is improved, ears are not. 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 She was started on hydrocortisone butuyrate and ketoconazole for the underarms. Biopsy obtained (eczematous dermatitis)\\n1/3/2020: Axillae have improved. scalp and ears as well. Continue current topicals and Pramosone lotion was added for the underarms.\\n2/5/2020: drastic improvement of axillae. She discontinued Hydrocortisone cream and is only using ketoconazole cream. scalp and ears are stable. Start Elidel cream to ears. \\n3/25/2020: scalp and ears not improved. Discussed Dupixent with patient. Consider after Cn19. Continue current topicals.\\n5/27/2020: scalp still flaring. Punch bx obtained of neck. (folliculitis, but not c/w clinical symptoms and presentation. Favor eczematous derm/AD. Continue current topicals. \\n8/4/2020: rash not well controlled on topicals. Start Dupixent enrollment. BSA 4, SEVERE. IGA score 4. Full biologic labs obtained. RX Valtrex given.\\n9/1/2020: DUPIXENT START TODAY. Recommend Systane eyedrops and Valtrex PRN as needed. \\n10/6/2020: Patient is somewhat improved, overall itching is better. Her ears are still very itchy. She is using Olux foam and ketoconazole cream to ears. She complains of new onset of joint pain above elbows and shoulders. She will see Dr. Dai and we will send letter.\\n11/3/2020: patient is improved overall. She still complains of itchy ears. Recommend talking to her audiologist to see if there are other hearing aids that are made of different material. RX Mimyx given for barrier cream. Continue Protopic 0.1% ointment QD PRN to ears and other topicals. Continue Dupixent injections. IGA score 1\\n12/9/2020: patient is much better. Ears are better. Hearing aid manufacturers told her that hearing aids are made out of medical grade hypoallergenic silicone. Itching has improved. Continue current topicals, antihistamines and Dupixent. Start Zonalon cream to ears for itching. Lab order given to patient today. Discussed that injection site reactions should subside.\\n3/17/21: eyes are very bothersome. Discussed possible component of rosacea. Rec warm rice sock compresses in addition to the Systane eye drops. For now continue Dupixent and topicals.\\n4/19/21: Eyes are still dry and bothersome. She states she will be seeing a dry eye specialist on Wednesday. She reports continued itching in the ears. Recommend not making too many changes to treatment regimen due to patient?s upcoming appt with dry eye specialist. Pramosone cream prescribed today for itching in ears, briefly discussed R/B/SE of gabapentin to consider starting at NOV if itching does not improve.\\n7/19/21: stable. Continue Dupixent Q 2 weeks and topicals. Recently diagnosed with gastroenteritis and possible sepsis. 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 Continue Dupixent and all topicals as needed.\\n1/19/22: patient is stable. Continue Dupixent and topicals PRN.\\n4/20/22: her itching (due to oak pollen) starts 3-4 days prior to next injection. Discussed Rinvoq with patient. Discussed it controls the itching better. Discussed risks, benefits and side effects with patient (increase in malignancies, cardio vascular events, blood clots, DVT - usually associated with prior underlying smoking history or other diseases). Discussed injecting Dupixent Q10 days vs Q14 days. Patient desires to continue Dupixent at shorter intervals. Consider adding on Tim inhibitor during environmental allergy season in the future.\\n5/18/22: Patient reports itching is minimally improved on modified Dupixent dosing (every 9-10 days). She reports worst areas are on the arms and inside ears. Re-discussed R/B/SE of Rinvoq in detail today. Patient reports worst 7/10 itch. Patient reports no personal hx of cancer or blood clots. She reports hx of migraines. Patient reports worsened eye dryness since starting Dupixent therapy. She is agreeable to start Rinvoq, insurance coverage check initiated today. Patient to obtain repeat full biologics labs, lab slip provided to patient to get drawn ASAP (MUST be fasting). She is due for her next Dupixent injection this Sunday. She has not been vaccinated for shingles, recommend getting shingles vaccine first ASAP, then inject Dupixent as scheduled on Sunday. F/u in 2 weeks. Sample saved for pt, plan to start therapy at NOV.\\n5/31/22: Patient received shingles vaccine right after LOV. Last Dupixent injection 5/22/22. Patient is agreeable to start Rinvoq today. Rinvoq PA denied, will submit appeal with SCORAD score (39). Symptoms are worsening, causing intense itchiness and sleeplessness. RINVOQ START TODAY. samples provided in office.\\n7/5/22: Patient reports itching was much better when taking Rinvoq. Decreased itch factor from 6-7, down to a 2-3. Patient reports light headedness, nausea, indigestion, twitching in her chest while taking Rinvoq. RINVOQ D/C. She stopped it a couple days ago due to symptoms. She is still recovering from the side effects. Recommend starting Opzelura at this point. Consider restarting Rinvoq every other day once symptoms have resolved. Discussed Vtama with patient as well if Opzelura is not improving her symptoms.\\n8/5/22: Pt reports itching has not improved since starting opzelura. Pt to RESTART DUPIXENT TODAY with loading dose after todays OV, sample given to Pt (1 300mg SC injection, pt states she has 1 pen at home). Rx sent to [facility].\\n9/2/22: pt states doing a little better after restarting Dupixent. Minor dry eyes and using Vytone and Olux foam topically currently. Will give pt Vtama sample today. Discussed considering restarting Rinvoq in future at lower dose. Patient states it worked the best but she complained of N/V and mild palpitations.\\n11/10/22: unimproved. First week after injection is good, then she itches again. Using a variety of topicals. Recommend trying Dermeleve cream and Remedy or glove in a bottle. Samples given. She will try injecting every week (supported by samples) x 1 month.\\n1/4/23: worse again. 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 Discussed restarting  Rinvoq at lower dosing vs prednisone taper. She reports insomnia with it. Patient agreeable to prednisone taper. Discussed MTX with patient. Will consider at follow up. Ears very itchy.\\n1/24/23: pt did not tolerate prednisone well, says eczema is unchanged. Itching on ears and shoulders today. Discussed MTX today and test dosage. Discussed regulatory blood work every month while on MTX. Pt would like to start MTX, will send Rx today. Pt to start 1/26. Will discontinue dupixent for now. MTX START TODAY.\\n2/22/23: pt fatigued and foggy on MTX. worse on dose day, better throughout the week. Ears look normal on exam today. Consider inc dosage at f/u if not further improved\\n3/21/23: pt reports less foggy and fatigue on current regimen. Continue same dose of MTX for another month, plan to increase NOV pending labs WNL.\\n4/4/23: new rash armpits. C/w ACD/ICD/eczema. Treat with TAC 0.1%. AD overall getting better. Less itching. Continue MTX per current regimen. Today not evaluated. Work in for rash.\\n04/21/23: pt reports rash under armpits has resolved. States she noticed a new rash yesterday also under LT underarm. As of today we will be increasing MTX dosage to 17.5/ week. Pt to take 3 QAM and 4 QPM only on fridays. Pt reports improvement in itching but still remains moderate to severe (5 to 6). Labs reviewed in OV, lab slip provided to pt for next OV.\\n5/23/23: unimproved. Recommend Olux foam to scalp. Recommend increase MTX to 4 pills BID. Patient agreeable.\\n6/20/23: pt stable. She reports side effects including fogginess and headaches with increased MTX dosage. Recommended continuing on current dosage and observe for resolution of side effects. Discussed patch testing in the fall.\\n07/19/23: pt reports stable. Still feeling nauseous and foggy but tolerable. Discussed continuing current regimen, pt agreeable.\\n8/22/23: Pt reports stable most days with itching mostly under control. Discussed continuing current regimen and Pt agreeable.\\n10/24/23: inadequately controlled. Discussed other tx options. Consider Gabapentin. Patient agreeable to START GABAPENTIN TODAY. Rx given. Continue methotrexate sodium 2.5 mg tablet: Take 4 tablets PO BID every Friday. Re check labs in 2 months.\\n11/27/23: pt reports a flare under the armpits and ear but is using Ketoconazole cream and triamcinalone which is knocking down the flare. Pt reports only being able to tolerate Gabapentin 300mg 1 PO QD before bedtime. Pt has increased fogginess with the gabapentin. Discussed starting Cibinqo in the future but will try current regimen for 1 more month to see if itching improves even more. Plan to continue topicals, gabapentin and methotrexate.\\n12/11/23: pt comes in today with nail thickening and pain, discussed with pt that the nail is most likely due to trauma. Also discussed that because pt is on methotrexate it makes her immunosuppressed which could lead to infections so advised Pt to keep an eye on the toe and watch out for any erythema or irritation. Continue current regimen and f/u as scheduled.\\n1/3/23: pt states skin is doing well. Since LOV Pt did have a flare under the armpits but was well controlled with TAC and Ketoconazole. Pt doing well on current regimen and has been able to increase gabapentin to 300mg PO BID. Pt still having toe pain so will begin treatment. Discussed going every other month on the bloodwork, Pt agreeable. F/u with pt in a month.\\n2/6/24: pt states skin is doing much better for this time of year than in past years. Pt states that she has had a few flares but is well controlled with topicals. Plan to continue on current regimen and f/u in 2 months.
Detail Level: Zone

## 2024-02-19 ENCOUNTER — RX ONLY (OUTPATIENT)
Age: 59
Setting detail: RX ONLY
End: 2024-02-19

## 2024-02-19 RX ORDER — METHOTREXATE SODIUM 2.5 MG/1
TABLET ORAL
Qty: 32 | Refills: 0 | Status: ERX

## 2024-03-20 ENCOUNTER — RX ONLY (OUTPATIENT)
Age: 59
Setting detail: RX ONLY
End: 2024-03-20

## 2024-03-20 RX ORDER — METHOTREXATE SODIUM 2.5 MG/1
TABLET ORAL
Qty: 32 | Refills: 0 | Status: ERX

## 2024-04-09 ENCOUNTER — APPOINTMENT (RX ONLY)
Dept: URBAN - METROPOLITAN AREA CLINIC 125 | Facility: CLINIC | Age: 59
Setting detail: DERMATOLOGY
End: 2024-04-09

## 2024-04-09 DIAGNOSIS — Z79.899 OTHER LONG TERM (CURRENT) DRUG THERAPY: ICD-10-CM

## 2024-04-09 DIAGNOSIS — L20.89 OTHER ATOPIC DERMATITIS: ICD-10-CM

## 2024-04-09 DIAGNOSIS — L60.3 NAIL DYSTROPHY: ICD-10-CM

## 2024-04-09 PROBLEM — L60.9 NAIL DISORDER, UNSPECIFIED: Status: ACTIVE | Noted: 2024-04-09

## 2024-04-09 PROCEDURE — ? PCR TESTING

## 2024-04-09 PROCEDURE — ? TREATMENT REGIMEN

## 2024-04-09 PROCEDURE — 99214 OFFICE O/P EST MOD 30 MIN: CPT

## 2024-04-09 PROCEDURE — ? PRESCRIPTION MEDICATION MANAGEMENT

## 2024-04-09 PROCEDURE — ? ORDER TESTS

## 2024-04-09 PROCEDURE — ? HIGH RISK MEDICATION MONITORING

## 2024-04-09 PROCEDURE — ? METHOTREXATE MONITORING

## 2024-04-09 PROCEDURE — ? COUNSELING

## 2024-04-09 PROCEDURE — ? CHRONOLOGY OF PRESENT ILLNESS

## 2024-04-09 ASSESSMENT — LOCATION SIMPLE DESCRIPTION DERM
LOCATION SIMPLE: LEFT AXILLARY VAULT
LOCATION SIMPLE: RIGHT EAR
LOCATION SIMPLE: LEFT HAND
LOCATION SIMPLE: LEFT SCALP
LOCATION SIMPLE: LEFT EAR
LOCATION SIMPLE: LEFT GREAT TOE
LOCATION SIMPLE: RIGHT FOREARM
LOCATION SIMPLE: RIGHT HAND
LOCATION SIMPLE: LEFT FOREARM

## 2024-04-09 ASSESSMENT — LOCATION ZONE DERM
LOCATION ZONE: SCALP
LOCATION ZONE: HAND
LOCATION ZONE: AXILLAE
LOCATION ZONE: TOE
LOCATION ZONE: ARM
LOCATION ZONE: EAR
LOCATION ZONE: TOENAIL

## 2024-04-09 ASSESSMENT — LOCATION DETAILED DESCRIPTION DERM
LOCATION DETAILED: LEFT MEDIAL FRONTAL SCALP
LOCATION DETAILED: LEFT INFERIOR CRUS OF ANTIHELIX
LOCATION DETAILED: LEFT AXILLARY VAULT
LOCATION DETAILED: LEFT RADIAL DORSAL HAND
LOCATION DETAILED: RIGHT ULNAR DORSAL HAND
LOCATION DETAILED: RIGHT CYMBA CONCHA
LOCATION DETAILED: LEFT LATERAL GREAT TOE
LOCATION DETAILED: RIGHT VENTRAL PROXIMAL FOREARM
LOCATION DETAILED: LEFT VENTRAL PROXIMAL FOREARM
LOCATION DETAILED: LEFT GREAT TOENAIL

## 2024-04-09 ASSESSMENT — SEVERITY ASSESSMENT 2020: SEVERITY 2020: MILD

## 2024-04-09 ASSESSMENT — BSA ECZEMA: % BODY COVERED IN ECZEMA: 2

## 2024-04-09 NOTE — PROCEDURE: MIPS QUALITY
170.18
Quality 431: Preventive Care And Screening: Unhealthy Alcohol Use - Screening: Patient screened for unhealthy alcohol use using a single question and scores less than 2 times per year
Quality 110: Preventive Care And Screening: Influenza Immunization: Influenza Immunization not Administered due to Patient Allergy.
Detail Level: Detailed
Quality 130: Documentation Of Current Medications In The Medical Record: Current Medications Documented
Quality 402: Tobacco Use And Help With Quitting Among Adolescents: Patient screened for tobacco and never smoked
Quality 226: Preventive Care And Screening: Tobacco Use: Screening And Cessation Intervention: Patient screened for tobacco use and is an ex/non-smoker
Quality 431: Preventive Care And Screening: Unhealthy Alcohol Use - Screening: Patient not identified as an unhealthy alcohol user when screened for unhealthy alcohol use using a systematic screening method

## 2024-04-09 NOTE — PROCEDURE: PCR TESTING
Detail Level: Simple
First Panel Ordered?: Mycosis Panel
Second Panel Ordered?: None
Pcr Panel Information (Will Not Render In Note): The PCR Bacterial panel will test for DNA from the following: Staph Aureus (including MRSA) and Group A Streptococcus. The PCR Mycosis panel will test for DNA from the following: Dermatophytes, Candida and Malassezia. The PCR Scabies panel will test for DNA from the following: Sarcoptes scabiei with optional testing for bacteria and fungus (if desired). The PCR Web Space panel will test for DNA from the following: Dermatophytes, Candida, Cornybacterium minutissimum, Gram negative bacteria, Staph aureus (including MRSA). The PCR Viral panel will test for DNA from the following: HSV1, HSV2 and VZV.
Lab Facility: [id number]

## 2024-04-09 NOTE — PROCEDURE: TREATMENT REGIMEN
Detail Level: Generalized
Plan: Pt has standing order. Last labs 4/4/24 WNL. Plan to get blood work in 2 months.

## 2024-04-09 NOTE — PROCEDURE: CHRONOLOGY OF PRESENT ILLNESS
Chronology Of Present Illness: 8/8/2019:  Rash, located on the right ear and scalp. The rash is scaly, red, and itchy. The rash has been present for years. She has arthritis. She is not currently on any medications. She was started on Olux foam and ketoconazole cream.\\n11/17/2019: scalp is improved, ears are not. 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 She was started on hydrocortisone butuyrate and ketoconazole for the underarms. Biopsy obtained (eczematous dermatitis)\\n1/3/2020: Axillae have improved. scalp and ears as well. Continue current topicals and Pramosone lotion was added for the underarms.\\n2/5/2020: drastic improvement of axillae. She discontinued Hydrocortisone cream and is only using ketoconazole cream. scalp and ears are stable. Start Elidel cream to ears. \\n3/25/2020: scalp and ears not improved. Discussed Dupixent with patient. Consider after Cn19. Continue current topicals.\\n5/27/2020: scalp still flaring. Punch bx obtained of neck. (folliculitis, but not c/w clinical symptoms and presentation. Favor eczematous derm/AD. Continue current topicals. \\n8/4/2020: rash not well controlled on topicals. Start Dupixent enrollment. BSA 4, SEVERE. IGA score 4. Full biologic labs obtained. RX Valtrex given.\\n9/1/2020: DUPIXENT START TODAY. Recommend Systane eyedrops and Valtrex PRN as needed. \\n10/6/2020: Patient is somewhat improved, overall itching is better. Her ears are still very itchy. She is using Olux foam and ketoconazole cream to ears. She complains of new onset of joint pain above elbows and shoulders. She will see Dr. Dai and we will send letter.\\n11/3/2020: patient is improved overall. She still complains of itchy ears. Recommend talking to her audiologist to see if there are other hearing aids that are made of different material. RX Mimyx given for barrier cream. Continue Protopic 0.1% ointment QD PRN to ears and other topicals. Continue Dupixent injections. IGA score 1\\n12/9/2020: patient is much better. Ears are better. Hearing aid manufacturers told her that hearing aids are made out of medical grade hypoallergenic silicone. Itching has improved. Continue current topicals, antihistamines and Dupixent. Start Zonalon cream to ears for itching. Lab order given to patient today. Discussed that injection site reactions should subside.\\n3/17/21: eyes are very bothersome. Discussed possible component of rosacea. Rec warm rice sock compresses in addition to the Systane eye drops. For now continue Dupixent and topicals.\\n4/19/21: Eyes are still dry and bothersome. She states she will be seeing a dry eye specialist on Wednesday. She reports continued itching in the ears. Recommend not making too many changes to treatment regimen due to patient?s upcoming appt with dry eye specialist. Pramosone cream prescribed today for itching in ears, briefly discussed R/B/SE of gabapentin to consider starting at NOV if itching does not improve.\\n7/19/21: stable. Continue Dupixent Q 2 weeks and topicals. Recently diagnosed with gastroenteritis and possible sepsis. 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 Continue Dupixent and all topicals as needed.\\n1/19/22: patient is stable. Continue Dupixent and topicals PRN.\\n4/20/22: her itching (due to oak pollen) starts 3-4 days prior to next injection. Discussed Rinvoq with patient. Discussed it controls the itching better. Discussed risks, benefits and side effects with patient (increase in malignancies, cardio vascular events, blood clots, DVT - usually associated with prior underlying smoking history or other diseases). Discussed injecting Dupixent Q10 days vs Q14 days. Patient desires to continue Dupixent at shorter intervals. Consider adding on Tim inhibitor during environmental allergy season in the future.\\n5/18/22: Patient reports itching is minimally improved on modified Dupixent dosing (every 9-10 days). She reports worst areas are on the arms and inside ears. Re-discussed R/B/SE of Rinvoq in detail today. Patient reports worst 7/10 itch. Patient reports no personal hx of cancer or blood clots. She reports hx of migraines. Patient reports worsened eye dryness since starting Dupixent therapy. She is agreeable to start Rinvoq, insurance coverage check initiated today. Patient to obtain repeat full biologics labs, lab slip provided to patient to get drawn ASAP (MUST be fasting). She is due for her next Dupixent injection this Sunday. She has not been vaccinated for shingles, recommend getting shingles vaccine first ASAP, then inject Dupixent as scheduled on Sunday. F/u in 2 weeks. Sample saved for pt, plan to start therapy at NOV.\\n5/31/22: Patient received shingles vaccine right after LOV. Last Dupixent injection 5/22/22. Patient is agreeable to start Rinvoq today. Rinvoq PA denied, will submit appeal with SCORAD score (39). Symptoms are worsening, causing intense itchiness and sleeplessness. RINVOQ START TODAY. samples provided in office.\\n7/5/22: Patient reports itching was much better when taking Rinvoq. Decreased itch factor from 6-7, down to a 2-3. Patient reports light headedness, nausea, indigestion, twitching in her chest while taking Rinvoq. RINVOQ D/C. She stopped it a couple days ago due to symptoms. She is still recovering from the side effects. Recommend starting Opzelura at this point. Consider restarting Rinvoq every other day once symptoms have resolved. Discussed Vtama with patient as well if Opzelura is not improving her symptoms.\\n8/5/22: Pt reports itching has not improved since starting opzelura. Pt to RESTART DUPIXENT TODAY with loading dose after todays OV, sample given to Pt (1 300mg SC injection, pt states she has 1 pen at home). Rx sent to [facility].\\n9/2/22: pt states doing a little better after restarting Dupixent. Minor dry eyes and using Vytone and Olux foam topically currently. Will give pt Vtama sample today. Discussed considering restarting Rinvoq in future at lower dose. Patient states it worked the best but she complained of N/V and mild palpitations.\\n11/10/22: unimproved. First week after injection is good, then she itches again. Using a variety of topicals. Recommend trying Dermeleve cream and Remedy or glove in a bottle. Samples given. She will try injecting every week (supported by samples) x 1 month.\\n1/4/23: worse again. 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 Discussed restarting  Rinvoq at lower dosing vs prednisone taper. She reports insomnia with it. Patient agreeable to prednisone taper. Discussed MTX with patient. Will consider at follow up. Ears very itchy.\\n1/24/23: pt did not tolerate prednisone well, says eczema is unchanged. Itching on ears and shoulders today. Discussed MTX today and test dosage. Discussed regulatory blood work every month while on MTX. Pt would like to start MTX, will send Rx today. Pt to start 1/26. Will discontinue dupixent for now. MTX START TODAY.\\n2/22/23: pt fatigued and foggy on MTX. worse on dose day, better throughout the week. Ears look normal on exam today. Consider inc dosage at f/u if not further improved\\n3/21/23: pt reports less foggy and fatigue on current regimen. Continue same dose of MTX for another month, plan to increase NOV pending labs WNL.\\n4/4/23: new rash armpits. C/w ACD/ICD/eczema. Treat with TAC 0.1%. AD overall getting better. Less itching. Continue MTX per current regimen. Today not evaluated. Work in for rash.\\n04/21/23: pt reports rash under armpits has resolved. States she noticed a new rash yesterday also under LT underarm. As of today we will be increasing MTX dosage to 17.5/ week. Pt to take 3 QAM and 4 QPM only on fridays. Pt reports improvement in itching but still remains moderate to severe (5 to 6). Labs reviewed in OV, lab slip provided to pt for next OV.\\n5/23/23: unimproved. Recommend Olux foam to scalp. Recommend increase MTX to 4 pills BID. Patient agreeable.\\n6/20/23: pt stable. She reports side effects including fogginess and headaches with increased MTX dosage. Recommended continuing on current dosage and observe for resolution of side effects. Discussed patch testing in the fall.\\n07/19/23: pt reports stable. Still feeling nauseous and foggy but tolerable. Discussed continuing current regimen, pt agreeable.\\n8/22/23: Pt reports stable most days with itching mostly under control. Discussed continuing current regimen and Pt agreeable.\\n10/24/23: inadequately controlled. Discussed other tx options. Consider Gabapentin. Patient agreeable to START GABAPENTIN TODAY. Rx given. Continue methotrexate sodium 2.5 mg tablet: Take 4 tablets PO BID every Friday. Re check labs in 2 months.\\n11/27/23: pt reports a flare under the armpits and ear but is using Ketoconazole cream and triamcinalone which is knocking down the flare. Pt reports only being able to tolerate Gabapentin 300mg 1 PO QD before bedtime. Pt has increased fogginess with the gabapentin. Discussed starting Cibinqo in the future but will try current regimen for 1 more month to see if itching improves even more. Plan to continue topicals, gabapentin and methotrexate.\\n12/11/23: pt comes in today with nail thickening and pain, discussed with pt that the nail is most likely due to trauma. Also discussed that because pt is on methotrexate it makes her immunosuppressed which could lead to infections so advised Pt to keep an eye on the toe and watch out for any erythema or irritation. Continue current regimen and f/u as scheduled.\\n1/3/23: pt states skin is doing well. Since LOV Pt did have a flare under the armpits but was well controlled with TAC and Ketoconazole. Pt doing well on current regimen and has been able to increase gabapentin to 300mg PO BID. Pt still having toe pain so will begin treatment. Discussed going every other month on the bloodwork, Pt agreeable. F/u with pt in a month.\\n2/6/24: pt states skin is doing much better for this time of year than in past years. Pt states that she has had a few flares but is well controlled with topicals. Plan to continue on current regimen and f/u in 2 months.\\n4/9/24: pt states she has had quite a few flares since LOV, thinks it’s due to pollen. Discussed staying on current dosage of methotrexate vs increasing dosage, pt would like to continue on current dosage. Using olux (ears), TAC and opzelura as needed. Pt still having issues with nail, vikor testing done today. Plan to continue current regimen and f/u in 2 months (give pt new standing order at NOV, will start drawing blood Q3 months.
Detail Level: Zone

## 2024-04-15 ENCOUNTER — RX ONLY (OUTPATIENT)
Age: 59
Setting detail: RX ONLY
End: 2024-04-15

## 2024-04-15 RX ORDER — METHOTREXATE SODIUM 2.5 MG/1
TABLET ORAL
Qty: 32 | Refills: 1 | Status: ERX

## 2024-06-10 ENCOUNTER — APPOINTMENT (RX ONLY)
Dept: URBAN - METROPOLITAN AREA CLINIC 125 | Facility: CLINIC | Age: 59
Setting detail: DERMATOLOGY
End: 2024-06-10

## 2024-06-10 DIAGNOSIS — L60.3 NAIL DYSTROPHY: ICD-10-CM

## 2024-06-10 DIAGNOSIS — Z79.899 OTHER LONG TERM (CURRENT) DRUG THERAPY: ICD-10-CM

## 2024-06-10 DIAGNOSIS — L20.89 OTHER ATOPIC DERMATITIS: ICD-10-CM

## 2024-06-10 PROBLEM — L60.9 NAIL DISORDER, UNSPECIFIED: Status: ACTIVE | Noted: 2024-06-10

## 2024-06-10 PROCEDURE — ? TREATMENT REGIMEN

## 2024-06-10 PROCEDURE — 99214 OFFICE O/P EST MOD 30 MIN: CPT

## 2024-06-10 PROCEDURE — ? HIGH RISK MEDICATION MONITORING

## 2024-06-10 PROCEDURE — ? VISIT COMPLEXITY

## 2024-06-10 PROCEDURE — ? COUNSELING

## 2024-06-10 PROCEDURE — ? CHRONOLOGY OF PRESENT ILLNESS

## 2024-06-10 PROCEDURE — ? PRESCRIPTION MEDICATION MANAGEMENT

## 2024-06-10 PROCEDURE — G2211 COMPLEX E/M VISIT ADD ON: HCPCS

## 2024-06-10 PROCEDURE — ? METHOTREXATE MONITORING

## 2024-06-10 PROCEDURE — ? ORDER TESTS

## 2024-06-10 ASSESSMENT — LOCATION ZONE DERM
LOCATION ZONE: SCALP
LOCATION ZONE: TOE
LOCATION ZONE: HAND
LOCATION ZONE: AXILLAE
LOCATION ZONE: ARM
LOCATION ZONE: EAR

## 2024-06-10 ASSESSMENT — LOCATION DETAILED DESCRIPTION DERM
LOCATION DETAILED: RIGHT CYMBA CONCHA
LOCATION DETAILED: LEFT LATERAL GREAT TOE
LOCATION DETAILED: RIGHT VENTRAL PROXIMAL FOREARM
LOCATION DETAILED: LEFT MEDIAL FRONTAL SCALP
LOCATION DETAILED: RIGHT ULNAR DORSAL HAND
LOCATION DETAILED: LEFT VENTRAL PROXIMAL FOREARM
LOCATION DETAILED: LEFT INFERIOR CRUS OF ANTIHELIX
LOCATION DETAILED: LEFT AXILLARY VAULT
LOCATION DETAILED: LEFT RADIAL DORSAL HAND

## 2024-06-10 ASSESSMENT — LOCATION SIMPLE DESCRIPTION DERM
LOCATION SIMPLE: LEFT FOREARM
LOCATION SIMPLE: LEFT SCALP
LOCATION SIMPLE: RIGHT EAR
LOCATION SIMPLE: RIGHT FOREARM
LOCATION SIMPLE: LEFT EAR
LOCATION SIMPLE: LEFT GREAT TOE
LOCATION SIMPLE: LEFT AXILLARY VAULT
LOCATION SIMPLE: RIGHT HAND
LOCATION SIMPLE: LEFT HAND

## 2024-06-10 NOTE — PROCEDURE: PRESCRIPTION MEDICATION MANAGEMENT
Render In Strict Bullet Format?: No
Detail Level: Zone
Continue Regimen: gabapentin 300 mg capsule: Take one capsule PO 1 h before bedtime x 1 day, then take one capsule PO BID x 1 day, then take one capsule PO TID for maintenance (pt only tolerating 2 PO QD)\\nmethotrexate sodium 2.5 mg tablet: Take 4 tablets PO BID every Friday\\nfolic acid 1 mg tablet: Take 1 PO QD\\nOlux 0.05% foam BID PRN flares (ears)\\nKetoconazole 2% shampoo BIW-TIW\\nKetoconazole 2% cream BID to ears\\nOpzelura - apply bid to AA prn
Plan: Advised pt to file down the raised areas of the nail.
Continue Regimen: Jublia 10 % topical solution with applicator : QD AAA left great toenail. Clean once a week with rubbing alcohol.

## 2024-06-10 NOTE — PROCEDURE: CHRONOLOGY OF PRESENT ILLNESS
Chronology Of Present Illness: 8/8/2019:  Rash, located on the right ear and scalp. The rash is scaly, red, and itchy. The rash has been present for years. She has arthritis. She is not currently on any medications. She was started on Olux foam and ketoconazole cream.\\n11/17/2019: scalp is improved, ears are not. 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 She was started on hydrocortisone butuyrate and ketoconazole for the underarms. Biopsy obtained (eczematous dermatitis)\\n1/3/2020: Axillae have improved. scalp and ears as well. Continue current topicals and Pramosone lotion was added for the underarms.\\n2/5/2020: drastic improvement of axillae. She discontinued Hydrocortisone cream and is only using ketoconazole cream. scalp and ears are stable. Start Elidel cream to ears. \\n3/25/2020: scalp and ears not improved. Discussed Dupixent with patient. Consider after Cn19. Continue current topicals.\\n5/27/2020: scalp still flaring. Punch bx obtained of neck. (folliculitis, but not c/w clinical symptoms and presentation. Favor eczematous derm/AD. Continue current topicals. \\n8/4/2020: rash not well controlled on topicals. Start Dupixent enrollment. BSA 4, SEVERE. IGA score 4. Full biologic labs obtained. RX Valtrex given.\\n9/1/2020: DUPIXENT START TODAY. Recommend Systane eyedrops and Valtrex PRN as needed. \\n10/6/2020: Patient is somewhat improved, overall itching is better. Her ears are still very itchy. She is using Olux foam and ketoconazole cream to ears. She complains of new onset of joint pain above elbows and shoulders. She will see Dr. Dai and we will send letter.\\n11/3/2020: patient is improved overall. She still complains of itchy ears. Recommend talking to her audiologist to see if there are other hearing aids that are made of different material. RX Mimyx given for barrier cream. Continue Protopic 0.1% ointment QD PRN to ears and other topicals. Continue Dupixent injections. IGA score 1\\n12/9/2020: patient is much better. Ears are better. Hearing aid manufacturers told her that hearing aids are made out of medical grade hypoallergenic silicone. Itching has improved. Continue current topicals, antihistamines and Dupixent. Start Zonalon cream to ears for itching. Lab order given to patient today. Discussed that injection site reactions should subside.\\n3/17/21: eyes are very bothersome. Discussed possible component of rosacea. Rec warm rice sock compresses in addition to the Systane eye drops. For now continue Dupixent and topicals.\\n4/19/21: Eyes are still dry and bothersome. She states she will be seeing a dry eye specialist on Wednesday. She reports continued itching in the ears. Recommend not making too many changes to treatment regimen due to patient?s upcoming appt with dry eye specialist. Pramosone cream prescribed today for itching in ears, briefly discussed R/B/SE of gabapentin to consider starting at NOV if itching does not improve.\\n7/19/21: stable. Continue Dupixent Q 2 weeks and topicals. Recently diagnosed with gastroenteritis and possible sepsis. 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 Continue Dupixent and all topicals as needed.\\n1/19/22: patient is stable. Continue Dupixent and topicals PRN.\\n4/20/22: her itching (due to oak pollen) starts 3-4 days prior to next injection. Discussed Rinvoq with patient. Discussed it controls the itching better. Discussed risks, benefits and side effects with patient (increase in malignancies, cardio vascular events, blood clots, DVT - usually associated with prior underlying smoking history or other diseases). Discussed injecting Dupixent Q10 days vs Q14 days. Patient desires to continue Dupixent at shorter intervals. Consider adding on Tim inhibitor during environmental allergy season in the future.\\n5/18/22: Patient reports itching is minimally improved on modified Dupixent dosing (every 9-10 days). She reports worst areas are on the arms and inside ears. Re-discussed R/B/SE of Rinvoq in detail today. Patient reports worst 7/10 itch. Patient reports no personal hx of cancer or blood clots. She reports hx of migraines. Patient reports worsened eye dryness since starting Dupixent therapy. She is agreeable to start Rinvoq, insurance coverage check initiated today. Patient to obtain repeat full biologics labs, lab slip provided to patient to get drawn ASAP (MUST be fasting). She is due for her next Dupixent injection this Sunday. She has not been vaccinated for shingles, recommend getting shingles vaccine first ASAP, then inject Dupixent as scheduled on Sunday. F/u in 2 weeks. Sample saved for pt, plan to start therapy at NOV.\\n5/31/22: Patient received shingles vaccine right after LOV. Last Dupixent injection 5/22/22. Patient is agreeable to start Rinvoq today. Rinvoq PA denied, will submit appeal with SCORAD score (39). Symptoms are worsening, causing intense itchiness and sleeplessness. RINVOQ START TODAY. samples provided in office.\\n7/5/22: Patient reports itching was much better when taking Rinvoq. Decreased itch factor from 6-7, down to a 2-3. Patient reports light headedness, nausea, indigestion, twitching in her chest while taking Rinvoq. RINVOQ D/C. She stopped it a couple days ago due to symptoms. She is still recovering from the side effects. Recommend starting Opzelura at this point. Consider restarting Rinvoq every other day once symptoms have resolved. Discussed Vtama with patient as well if Opzelura is not improving her symptoms.\\n8/5/22: Pt reports itching has not improved since starting opzelura. Pt to RESTART DUPIXENT TODAY with loading dose after todays OV, sample given to Pt (1 300mg SC injection, pt states she has 1 pen at home). Rx sent to [facility].\\n9/2/22: pt states doing a little better after restarting Dupixent. Minor dry eyes and using Vytone and Olux foam topically currently. Will give pt Vtama sample today. Discussed considering restarting Rinvoq in future at lower dose. Patient states it worked the best but she complained of N/V and mild palpitations.\\n11/10/22: unimproved. First week after injection is good, then she itches again. Using a variety of topicals. Recommend trying Dermeleve cream and Remedy or glove in a bottle. Samples given. She will try injecting every week (supported by samples) x 1 month.\\n1/4/23: worse again. 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 Discussed restarting  Rinvoq at lower dosing vs prednisone taper. She reports insomnia with it. Patient agreeable to prednisone taper. Discussed MTX with patient. Will consider at follow up. Ears very itchy.\\n1/24/23: pt did not tolerate prednisone well, says eczema is unchanged. Itching on ears and shoulders today. Discussed MTX today and test dosage. Discussed regulatory blood work every month while on MTX. Pt would like to start MTX, will send Rx today. Pt to start 1/26. Will discontinue dupixent for now. MTX START TODAY.\\n2/22/23: pt fatigued and foggy on MTX. worse on dose day, better throughout the week. Ears look normal on exam today. Consider inc dosage at f/u if not further improved\\n3/21/23: pt reports less foggy and fatigue on current regimen. Continue same dose of MTX for another month, plan to increase NOV pending labs WNL.\\n4/4/23: new rash armpits. C/w ACD/ICD/eczema. Treat with TAC 0.1%. AD overall getting better. Less itching. Continue MTX per current regimen. Today not evaluated. Work in for rash.\\n04/21/23: pt reports rash under armpits has resolved. States she noticed a new rash yesterday also under LT underarm. As of today we will be increasing MTX dosage to 17.5/ week. Pt to take 3 QAM and 4 QPM only on fridays. Pt reports improvement in itching but still remains moderate to severe (5 to 6). Labs reviewed in OV, lab slip provided to pt for next OV.\\n5/23/23: unimproved. Recommend Olux foam to scalp. Recommend increase MTX to 4 pills BID. Patient agreeable.\\n6/20/23: pt stable. She reports side effects including fogginess and headaches with increased MTX dosage. Recommended continuing on current dosage and observe for resolution of side effects. Discussed patch testing in the fall.\\n07/19/23: pt reports stable. Still feeling nauseous and foggy but tolerable. Discussed continuing current regimen, pt agreeable.\\n8/22/23: Pt reports stable most days with itching mostly under control. Discussed continuing current regimen and Pt agreeable.\\n10/24/23: inadequately controlled. Discussed other tx options. Consider Gabapentin. Patient agreeable to START GABAPENTIN TODAY. Rx given. Continue methotrexate sodium 2.5 mg tablet: Take 4 tablets PO BID every Friday. Re check labs in 2 months.\\n11/27/23: pt reports a flare under the armpits and ear but is using Ketoconazole cream and triamcinalone which is knocking down the flare. Pt reports only being able to tolerate Gabapentin 300mg 1 PO QD before bedtime. Pt has increased fogginess with the gabapentin. Discussed starting Cibinqo in the future but will try current regimen for 1 more month to see if itching improves even more. Plan to continue topicals, gabapentin and methotrexate.\\n12/11/23: pt comes in today with nail thickening and pain, discussed with pt that the nail is most likely due to trauma. Also discussed that because pt is on methotrexate it makes her immunosuppressed which could lead to infections so advised Pt to keep an eye on the toe and watch out for any erythema or irritation. Continue current regimen and f/u as scheduled.\\n1/3/23: pt states skin is doing well. Since LOV Pt did have a flare under the armpits but was well controlled with TAC and Ketoconazole. Pt doing well on current regimen and has been able to increase gabapentin to 300mg PO BID. Pt still having toe pain so will begin treatment. Discussed going every other month on the bloodwork, Pt agreeable. F/u with pt in a month.\\n2/6/24: pt states skin is doing much better for this time of year than in past years. Pt states that she has had a few flares but is well controlled with topicals. Plan to continue on current regimen and f/u in 2 months.\\n4/9/24: pt states she has had quite a few flares since LOV, thinks it’s due to pollen. Discussed staying on current dosage of methotrexate vs increasing dosage, pt would like to continue on current dosage. Using olux (ears), TAC and opzelura as needed. Pt still having issues with nail, vikor testing done today. Plan to continue current regimen and f/u in 2 months (give pt new standing order at [facility], will start drawing blood Q3 months.
Detail Level: Zone

## 2024-06-10 NOTE — PROCEDURE: TREATMENT REGIMEN
Detail Level: Generalized
Plan: Pt has standing order. Last labs 4/4/24 WNL. Plan to get blood work end of July.

## 2024-09-10 ENCOUNTER — RX ONLY (OUTPATIENT)
Age: 59
Setting detail: RX ONLY
End: 2024-09-10

## 2024-09-10 ENCOUNTER — APPOINTMENT (RX ONLY)
Dept: URBAN - METROPOLITAN AREA CLINIC 125 | Facility: CLINIC | Age: 59
Setting detail: DERMATOLOGY
End: 2024-09-10

## 2024-09-10 DIAGNOSIS — B08.1 MOLLUSCUM CONTAGIOSUM: ICD-10-CM

## 2024-09-10 DIAGNOSIS — L20.89 OTHER ATOPIC DERMATITIS: ICD-10-CM | Status: STABLE

## 2024-09-10 DIAGNOSIS — L60.3 NAIL DYSTROPHY: ICD-10-CM | Status: STABLE

## 2024-09-10 DIAGNOSIS — Z79.899 OTHER LONG TERM (CURRENT) DRUG THERAPY: ICD-10-CM

## 2024-09-10 PROBLEM — L60.9 NAIL DISORDER, UNSPECIFIED: Status: ACTIVE | Noted: 2024-09-10

## 2024-09-10 PROCEDURE — ? LIQUID NITROGEN

## 2024-09-10 PROCEDURE — ? COUNSELING

## 2024-09-10 PROCEDURE — ? HIGH RISK MEDICATION MONITORING

## 2024-09-10 PROCEDURE — ? TREATMENT REGIMEN

## 2024-09-10 PROCEDURE — 99214 OFFICE O/P EST MOD 30 MIN: CPT | Mod: 25

## 2024-09-10 PROCEDURE — ? CHRONOLOGY OF PRESENT ILLNESS

## 2024-09-10 PROCEDURE — ? VISIT COMPLEXITY

## 2024-09-10 PROCEDURE — 17110 DESTRUCTION B9 LES UP TO 14: CPT

## 2024-09-10 PROCEDURE — ? PRESCRIPTION MEDICATION MANAGEMENT

## 2024-09-10 PROCEDURE — ? METHOTREXATE MONITORING

## 2024-09-10 RX ORDER — RUXOLITINIB 15 MG/G
CREAM TOPICAL
Qty: 60 | Refills: 2 | Status: ERX

## 2024-09-10 ASSESSMENT — LOCATION ZONE DERM
LOCATION ZONE: EAR
LOCATION ZONE: TOE
LOCATION ZONE: HAND
LOCATION ZONE: FINGER
LOCATION ZONE: SCALP
LOCATION ZONE: AXILLAE
LOCATION ZONE: ARM

## 2024-09-10 ASSESSMENT — LOCATION SIMPLE DESCRIPTION DERM
LOCATION SIMPLE: LEFT AXILLARY VAULT
LOCATION SIMPLE: LEFT FOREARM
LOCATION SIMPLE: LEFT GREAT TOE
LOCATION SIMPLE: RIGHT EAR
LOCATION SIMPLE: LEFT SCALP
LOCATION SIMPLE: LEFT EAR
LOCATION SIMPLE: RIGHT HAND
LOCATION SIMPLE: LEFT RING FINGER
LOCATION SIMPLE: RIGHT FOREARM
LOCATION SIMPLE: LEFT HAND

## 2024-09-10 ASSESSMENT — LOCATION DETAILED DESCRIPTION DERM
LOCATION DETAILED: LEFT DORSAL RING FINGER METACARPOPHALANGEAL JOINT
LOCATION DETAILED: RIGHT DORSAL RING FINGER METACARPOPHALANGEAL JOINT
LOCATION DETAILED: LEFT INFERIOR CRUS OF ANTIHELIX
LOCATION DETAILED: LEFT AXILLARY VAULT
LOCATION DETAILED: LEFT RADIAL DORSAL HAND
LOCATION DETAILED: RIGHT ULNAR DORSAL HAND
LOCATION DETAILED: LEFT MEDIAL FRONTAL SCALP
LOCATION DETAILED: RIGHT CYMBA CONCHA
LOCATION DETAILED: RIGHT DORSAL MIDDLE FINGER METACARPOPHALANGEAL JOINT
LOCATION DETAILED: LEFT VENTRAL PROXIMAL FOREARM
LOCATION DETAILED: LEFT PROXIMAL DORSAL RING FINGER
LOCATION DETAILED: LEFT LATERAL GREAT TOE
LOCATION DETAILED: 3RD WEB SPACE RIGHT HAND
LOCATION DETAILED: RIGHT VENTRAL PROXIMAL FOREARM

## 2024-09-10 ASSESSMENT — SEVERITY ASSESSMENT 2020: SEVERITY 2020: ALMOST CLEAR

## 2024-09-10 ASSESSMENT — BSA ECZEMA: % BODY COVERED IN ECZEMA: 1

## 2024-09-10 NOTE — PROCEDURE: LIQUID NITROGEN
Medical Necessity Information: It is in your best interest to select a reason for this procedure from the list below. All of these items fulfill various CMS LCD requirements except the new and changing color options.
Show Topical Anesthesia Variable?: Yes
Include Z78.9 (Other Specified Conditions Influencing Health Status) As An Associated Diagnosis?: No
Number Of Freeze-Thaw Cycles: 2 freeze-thaw cycles
Medical Necessity Clause: This procedure was medically necessary because the lesions that were treated were intensely:
Detail Level: Detailed
Post-Care Instructions: I reviewed with the patient in detail post-care instructions. Patient is to wear sunprotection, and avoid picking at any of the treated lesions. Patient may apply Vaseline to crusted or scabbed areas.
Spray Paint Text: The liquid nitrogen was applied to the skin utilizing a spray paint frosting technique.
Consent: The patient's consent was obtained including but not limited to risks of crusting, scabbing, blistering, scarring, darker or lighter pigmentary change, recurrence, incomplete removal and infection.

## 2024-09-10 NOTE — PROCEDURE: TREATMENT REGIMEN
Detail Level: Generalized
Plan: Pt has standing order. Last labs 7/29/24 WNL. Plan to get blood work end of October

## 2024-09-10 NOTE — PROCEDURE: CHRONOLOGY OF PRESENT ILLNESS
Chronology Of Present Illness: 8/8/2019:  Rash, located on the right ear and scalp. The rash is scaly, red, and itchy. The rash has been present for years. She has arthritis. She is not currently on any medications. She was started on Olux foam and ketoconazole cream.\\n11/17/2019: scalp is improved, ears are not. 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 She was started on hydrocortisone butuyrate and ketoconazole for the underarms. Biopsy obtained (eczematous dermatitis)\\n1/3/2020: Axillae have improved. scalp and ears as well. Continue current topicals and Pramosone lotion was added for the underarms.\\n2/5/2020: drastic improvement of axillae. She discontinued Hydrocortisone cream and is only using ketoconazole cream. scalp and ears are stable. Start Elidel cream to ears. \\n3/25/2020: scalp and ears not improved. Discussed Dupixent with patient. Consider after Cn19. Continue current topicals.\\n5/27/2020: scalp still flaring. Punch bx obtained of neck. (folliculitis, but not c/w clinical symptoms and presentation. Favor eczematous derm/AD. Continue current topicals. \\n8/4/2020: rash not well controlled on topicals. Start Dupixent enrollment. BSA 4, SEVERE. IGA score 4. Full biologic labs obtained. RX Valtrex given.\\n9/1/2020: DUPIXENT START TODAY. Recommend Systane eyedrops and Valtrex PRN as needed. \\n10/6/2020: Patient is somewhat improved, overall itching is better. Her ears are still very itchy. She is using Olux foam and ketoconazole cream to ears. She complains of new onset of joint pain above elbows and shoulders. She will see Dr. Dai and we will send letter.\\n11/3/2020: patient is improved overall. She still complains of itchy ears. Recommend talking to her audiologist to see if there are other hearing aids that are made of different material. RX Mimyx given for barrier cream. Continue Protopic 0.1% ointment QD PRN to ears and other topicals. Continue Dupixent injections. IGA score 1\\n12/9/2020: patient is much better. Ears are better. Hearing aid manufacturers told her that hearing aids are made out of medical grade hypoallergenic silicone. Itching has improved. Continue current topicals, antihistamines and Dupixent. Start Zonalon cream to ears for itching. Lab order given to patient today. Discussed that injection site reactions should subside.\\n3/17/21: eyes are very bothersome. Discussed possible component of rosacea. Rec warm rice sock compresses in addition to the Systane eye drops. For now continue Dupixent and topicals.\\n4/19/21: Eyes are still dry and bothersome. She states she will be seeing a dry eye specialist on Wednesday. She reports continued itching in the ears. Recommend not making too many changes to treatment regimen due to patient?s upcoming appt with dry eye specialist. Pramosone cream prescribed today for itching in ears, briefly discussed R/B/SE of gabapentin to consider starting at NOV if itching does not improve.\\n7/19/21: stable. Continue Dupixent Q 2 weeks and topicals. Recently diagnosed with gastroenteritis and possible sepsis. 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 Continue Dupixent and all topicals as needed.\\n1/19/22: patient is stable. Continue Dupixent and topicals PRN.\\n4/20/22: her itching (due to oak pollen) starts 3-4 days prior to next injection. Discussed Rinvoq with patient. Discussed it controls the itching better. Discussed risks, benefits and side effects with patient (increase in malignancies, cardio vascular events, blood clots, DVT - usually associated with prior underlying smoking history or other diseases). Discussed injecting Dupixent Q10 days vs Q14 days. Patient desires to continue Dupixent at shorter intervals. Consider adding on Tim inhibitor during environmental allergy season in the future.\\n5/18/22: Patient reports itching is minimally improved on modified Dupixent dosing (every 9-10 days). She reports worst areas are on the arms and inside ears. Re-discussed R/B/SE of Rinvoq in detail today. Patient reports worst 7/10 itch. Patient reports no personal hx of cancer or blood clots. She reports hx of migraines. Patient reports worsened eye dryness since starting Dupixent therapy. She is agreeable to start Rinvoq, insurance coverage check initiated today. Patient to obtain repeat full biologics labs, lab slip provided to patient to get drawn ASAP (MUST be fasting). She is due for her next Dupixent injection this Sunday. She has not been vaccinated for shingles, recommend getting shingles vaccine first ASAP, then inject Dupixent as scheduled on Sunday. F/u in 2 weeks. Sample saved for pt, plan to start therapy at NOV.\\n5/31/22: Patient received shingles vaccine right after LOV. Last Dupixent injection 5/22/22. Patient is agreeable to start Rinvoq today. Rinvoq PA denied, will submit appeal with SCORAD score (39). Symptoms are worsening, causing intense itchiness and sleeplessness. RINVOQ START TODAY. samples provided in office.\\n7/5/22: Patient reports itching was much better when taking Rinvoq. Decreased itch factor from 6-7, down to a 2-3. Patient reports light headedness, nausea, indigestion, twitching in her chest while taking Rinvoq. RINVOQ D/C. She stopped it a couple days ago due to symptoms. She is still recovering from the side effects. Recommend starting Opzelura at this point. Consider restarting Rinvoq every other day once symptoms have resolved. Discussed Vtama with patient as well if Opzelura is not improving her symptoms.\\n8/5/22: Pt reports itching has not improved since starting opzelura. Pt to RESTART DUPIXENT TODAY with loading dose after todays OV, sample given to Pt (1 300mg SC injection, pt states she has 1 pen at home). Rx sent to [facility].\\n9/2/22: pt states doing a little better after restarting Dupixent. Minor dry eyes and using Vytone and Olux foam topically currently. Will give pt Vtama sample today. Discussed considering restarting Rinvoq in future at lower dose. Patient states it worked the best but she complained of N/V and mild palpitations.\\n11/10/22: unimproved. First week after injection is good, then she itches again. Using a variety of topicals. Recommend trying Dermeleve cream and Remedy or glove in a bottle. Samples given. She will try injecting every week (supported by samples) x 1 month.\\n1/4/23: worse again. 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 Discussed restarting  Rinvoq at lower dosing vs prednisone taper. She reports insomnia with it. Patient agreeable to prednisone taper. Discussed MTX with patient. Will consider at follow up. Ears very itchy.\\n1/24/23: pt did not tolerate prednisone well, says eczema is unchanged. Itching on ears and shoulders today. Discussed MTX today and test dosage. Discussed regulatory blood work every month while on MTX. Pt would like to start MTX, will send Rx today. Pt to start 1/26. Will discontinue dupixent for now. MTX START TODAY.\\n2/22/23: pt fatigued and foggy on MTX. worse on dose day, better throughout the week. Ears look normal on exam today. Consider inc dosage at f/u if not further improved\\n3/21/23: pt reports less foggy and fatigue on current regimen. Continue same dose of MTX for another month, plan to increase NOV pending labs WNL.\\n4/4/23: new rash armpits. C/w ACD/ICD/eczema. Treat with TAC 0.1%. AD overall getting better. Less itching. Continue MTX per current regimen. Today not evaluated. Work in for rash.\\n04/21/23: pt reports rash under armpits has resolved. States she noticed a new rash yesterday also under LT underarm. As of today we will be increasing MTX dosage to 17.5/ week. Pt to take 3 QAM and 4 QPM only on fridays. Pt reports improvement in itching but still remains moderate to severe (5 to 6). Labs reviewed in OV, lab slip provided to pt for next OV.\\n5/23/23: unimproved. Recommend Olux foam to scalp. Recommend increase MTX to 4 pills BID. Patient agreeable.\\n6/20/23: pt stable. She reports side effects including fogginess and headaches with increased MTX dosage. Recommended continuing on current dosage and observe for resolution of side effects. Discussed patch testing in the fall.\\n07/19/23: pt reports stable. Still feeling nauseous and foggy but tolerable. Discussed continuing current regimen, pt agreeable.\\n8/22/23: Pt reports stable most days with itching mostly under control. Discussed continuing current regimen and Pt agreeable.\\n10/24/23: inadequately controlled. Discussed other tx options. Consider Gabapentin. Patient agreeable to START GABAPENTIN TODAY. Rx given. Continue methotrexate sodium 2.5 mg tablet: Take 4 tablets PO BID every Friday. Re check labs in 2 months.\\n11/27/23: pt reports a flare under the armpits and ear but is using Ketoconazole cream and triamcinalone which is knocking down the flare. Pt reports only being able to tolerate Gabapentin 300mg 1 PO QD before bedtime. Pt has increased fogginess with the gabapentin. Discussed starting Cibinqo in the future but will try current regimen for 1 more month to see if itching improves even more. Plan to continue topicals, gabapentin and methotrexate.\\n12/11/23: pt comes in today with nail thickening and pain, discussed with pt that the nail is most likely due to trauma. Also discussed that because pt is on methotrexate it makes her immunosuppressed which could lead to infections so advised Pt to keep an eye on the toe and watch out for any erythema or irritation. Continue current regimen and f/u as scheduled.\\n1/3/23: pt states skin is doing well. Since LOV Pt did have a flare under the armpits but was well controlled with TAC and Ketoconazole. Pt doing well on current regimen and has been able to increase gabapentin to 300mg PO BID. Pt still having toe pain so will begin treatment. Discussed going every other month on the bloodwork, Pt agreeable. F/u with pt in a month.\\n2/6/24: pt states skin is doing much better for this time of year than in past years. Pt states that she has had a few flares but is well controlled with topicals. Plan to continue on current regimen and f/u in 2 months.\\n4/9/24: pt states she has had quite a few flares since LOV, thinks it’s due to pollen. Discussed staying on current dosage of methotrexate vs increasing dosage, pt would like to continue on current dosage. Using olux (ears), TAC and opzelura as needed. Pt still having issues with nail, vikor testing done today. Plan to continue current regimen and f/u in 2 months (give pt new standing order at NOV, will start drawing blood Q3 months.\\n9/10/24: pt states flaring on tops of both hands with itching, as well as under the arms. Pt to continue using current topicals and pills. Nails doing well with improved growth. Pt states ears doing well, discussed increasing methotrexate dosage, pt would like to continue on current dosage due to brain fog on current dose.
Detail Level: Zone

## 2024-11-21 ENCOUNTER — RX ONLY (OUTPATIENT)
Age: 59
Setting detail: RX ONLY
End: 2024-11-21

## 2024-11-21 RX ORDER — METHOTREXATE SODIUM 2.5 MG/1
TABLET ORAL
Qty: 32 | Refills: 1 | Status: ERX

## 2024-12-10 ENCOUNTER — APPOINTMENT (RX ONLY)
Dept: URBAN - METROPOLITAN AREA CLINIC 125 | Facility: CLINIC | Age: 59
Setting detail: DERMATOLOGY
End: 2024-12-10

## 2024-12-10 DIAGNOSIS — L20.89 OTHER ATOPIC DERMATITIS: ICD-10-CM | Status: STABLE

## 2024-12-10 DIAGNOSIS — L60.3 NAIL DYSTROPHY: ICD-10-CM | Status: STABLE

## 2024-12-10 DIAGNOSIS — Z79.899 OTHER LONG TERM (CURRENT) DRUG THERAPY: ICD-10-CM

## 2024-12-10 DIAGNOSIS — B08.1 MOLLUSCUM CONTAGIOSUM: ICD-10-CM

## 2024-12-10 PROBLEM — L60.9 NAIL DISORDER, UNSPECIFIED: Status: ACTIVE | Noted: 2024-12-10

## 2024-12-10 PROCEDURE — ? METHOTREXATE MONITORING

## 2024-12-10 PROCEDURE — ? HIGH RISK MEDICATION MONITORING

## 2024-12-10 PROCEDURE — ? PRESCRIPTION MEDICATION MANAGEMENT

## 2024-12-10 PROCEDURE — 99214 OFFICE O/P EST MOD 30 MIN: CPT | Mod: 25

## 2024-12-10 PROCEDURE — 17110 DESTRUCTION B9 LES UP TO 14: CPT

## 2024-12-10 PROCEDURE — ? VISIT COMPLEXITY

## 2024-12-10 PROCEDURE — ? COUNSELING

## 2024-12-10 PROCEDURE — ? CHRONOLOGY OF PRESENT ILLNESS

## 2024-12-10 PROCEDURE — ? PRESCRIPTION

## 2024-12-10 PROCEDURE — ? TREATMENT REGIMEN

## 2024-12-10 PROCEDURE — ? LIQUID NITROGEN

## 2024-12-10 RX ORDER — KETOCONAZOLE 20 MG/G
CREAM TOPICAL
Qty: 60 | Refills: 1 | Status: ERX | COMMUNITY
Start: 2024-12-10

## 2024-12-10 RX ADMIN — KETOCONAZOLE: 20 CREAM TOPICAL at 00:00

## 2024-12-10 ASSESSMENT — LOCATION ZONE DERM
LOCATION ZONE: FINGER
LOCATION ZONE: TOE
LOCATION ZONE: EAR
LOCATION ZONE: SCALP
LOCATION ZONE: ARM
LOCATION ZONE: AXILLAE
LOCATION ZONE: HAND

## 2024-12-10 ASSESSMENT — LOCATION DETAILED DESCRIPTION DERM
LOCATION DETAILED: LEFT RADIAL DORSAL HAND
LOCATION DETAILED: LEFT AXILLARY VAULT
LOCATION DETAILED: 3RD WEB SPACE RIGHT HAND
LOCATION DETAILED: RIGHT CYMBA CONCHA
LOCATION DETAILED: LEFT DORSAL RING FINGER METACARPOPHALANGEAL JOINT
LOCATION DETAILED: RIGHT VENTRAL PROXIMAL FOREARM
LOCATION DETAILED: LEFT LATERAL GREAT TOE
LOCATION DETAILED: LEFT VENTRAL PROXIMAL FOREARM
LOCATION DETAILED: RIGHT DORSAL RING FINGER METACARPOPHALANGEAL JOINT
LOCATION DETAILED: LEFT PROXIMAL DORSAL RING FINGER
LOCATION DETAILED: LEFT MEDIAL FRONTAL SCALP
LOCATION DETAILED: LEFT INFERIOR CRUS OF ANTIHELIX
LOCATION DETAILED: RIGHT ULNAR DORSAL HAND
LOCATION DETAILED: RIGHT DORSAL MIDDLE FINGER METACARPOPHALANGEAL JOINT

## 2024-12-10 ASSESSMENT — LOCATION SIMPLE DESCRIPTION DERM
LOCATION SIMPLE: LEFT FOREARM
LOCATION SIMPLE: LEFT EAR
LOCATION SIMPLE: RIGHT FOREARM
LOCATION SIMPLE: LEFT HAND
LOCATION SIMPLE: RIGHT EAR
LOCATION SIMPLE: RIGHT HAND
LOCATION SIMPLE: LEFT GREAT TOE
LOCATION SIMPLE: LEFT SCALP
LOCATION SIMPLE: LEFT AXILLARY VAULT
LOCATION SIMPLE: LEFT RING FINGER

## 2024-12-10 NOTE — PROCEDURE: PRESCRIPTION MEDICATION MANAGEMENT
Render In Strict Bullet Format?: No
Detail Level: Zone
Continue Regimen: gabapentin 300 mg capsule: Take one capsule PO 1 h before bedtime x 1 day, then take one capsule PO BID x 1 day, then take one capsule PO TID for maintenance (Patient is able to tolerate 3 PO QD)\\nmethotrexate sodium 2.5 mg tablet: Take 4 tablets PO BID every Friday\\nfolic acid 1 mg tablet: Take 1 PO QD\\nOlux 0.05% foam BID PRN flares (ears)\\nKetoconazole 2% shampoo [id number]\\nKetoconazole 2% cream BID to ears\\nOpzelura - apply bid to AA prn
Plan: Advised pt to file down the raised areas of the nail.
Continue Regimen: Jublia 10 % topical solution with applicator : QD AAA left great toenail. Clean once a week with rubbing alcohol.

## 2024-12-10 NOTE — PROCEDURE: CHRONOLOGY OF PRESENT ILLNESS
Chronology Of Present Illness: 8/8/2019:  Rash, located on the right ear and scalp. The rash is scaly, red, and itchy. The rash has been present for years. She has arthritis. She is not currently on any medications. She was started on Olux foam and ketoconazole cream.\\n11/17/2019: scalp is improved, ears are not. 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 She was started on hydrocortisone butuyrate and ketoconazole for the underarms. Biopsy obtained (eczematous dermatitis)\\n1/3/2020: Axillae have improved. scalp and ears as well. Continue current topicals and Pramosone lotion was added for the underarms.\\n2/5/2020: drastic improvement of axillae. She discontinued Hydrocortisone cream and is only using ketoconazole cream. scalp and ears are stable. Start Elidel cream to ears. \\n3/25/2020: scalp and ears not improved. Discussed Dupixent with patient. Consider after Cn19. Continue current topicals.\\n5/27/2020: scalp still flaring. Punch bx obtained of neck. (folliculitis, but not c/w clinical symptoms and presentation. Favor eczematous derm/AD. Continue current topicals. \\n8/4/2020: rash not well controlled on topicals. Start Dupixent enrollment. BSA 4, SEVERE. IGA score 4. Full biologic labs obtained. RX Valtrex given.\\n9/1/2020: DUPIXENT START TODAY. Recommend Systane eyedrops and Valtrex PRN as needed. \\n10/6/2020: Patient is somewhat improved, overall itching is better. Her ears are still very itchy. She is using Olux foam and ketoconazole cream to ears. She complains of new onset of joint pain above elbows and shoulders. She will see Dr. Dai and we will send letter.\\n11/3/2020: patient is improved overall. She still complains of itchy ears. Recommend talking to her audiologist to see if there are other hearing aids that are made of different material. RX Mimyx given for barrier cream. Continue Protopic 0.1% ointment QD PRN to ears and other topicals. Continue Dupixent injections. IGA score 1\\n12/9/2020: patient is much better. Ears are better. Hearing aid manufacturers told her that hearing aids are made out of medical grade hypoallergenic silicone. Itching has improved. Continue current topicals, antihistamines and Dupixent. Start Zonalon cream to ears for itching. Lab order given to patient today. Discussed that injection site reactions should subside.\\n3/17/21: eyes are very bothersome. Discussed possible component of rosacea. Rec warm rice sock compresses in addition to the Systane eye drops. For now continue Dupixent and topicals.\\n4/19/21: Eyes are still dry and bothersome. She states she will be seeing a dry eye specialist on Wednesday. She reports continued itching in the ears. Recommend not making too many changes to treatment regimen due to patient?s upcoming appt with dry eye specialist. Pramosone cream prescribed today for itching in ears, briefly discussed R/B/SE of gabapentin to consider starting at NOV if itching does not improve.\\n7/19/21: stable. Continue Dupixent Q 2 weeks and topicals. Recently diagnosed with gastroenteritis and possible sepsis. 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 Continue Dupixent and all topicals as needed.\\n1/19/22: patient is stable. Continue Dupixent and topicals PRN.\\n4/20/22: her itching (due to oak pollen) starts 3-4 days prior to next injection. Discussed Rinvoq with patient. Discussed it controls the itching better. Discussed risks, benefits and side effects with patient (increase in malignancies, cardio vascular events, blood clots, DVT - usually associated with prior underlying smoking history or other diseases). Discussed injecting Dupixent Q10 days vs Q14 days. Patient desires to continue Dupixent at shorter intervals. Consider adding on Tim inhibitor during environmental allergy season in the future.\\n5/18/22: Patient reports itching is minimally improved on modified Dupixent dosing (every 9-10 days). She reports worst areas are on the arms and inside ears. Re-discussed R/B/SE of Rinvoq in detail today. Patient reports worst 7/10 itch. Patient reports no personal hx of cancer or blood clots. She reports hx of migraines. Patient reports worsened eye dryness since starting Dupixent therapy. She is agreeable to start Rinvoq, insurance coverage check initiated today. Patient to obtain repeat full biologics labs, lab slip provided to patient to get drawn ASAP (MUST be fasting). She is due for her next Dupixent injection this Sunday. She has not been vaccinated for shingles, recommend getting shingles vaccine first ASAP, then inject Dupixent as scheduled on Sunday. F/u in 2 weeks. Sample saved for pt, plan to start therapy at NOV.\\n5/31/22: Patient received shingles vaccine right after LOV. Last Dupixent injection 5/22/22. Patient is agreeable to start Rinvoq today. Rinvoq PA denied, will submit appeal with SCORAD score (39). Symptoms are worsening, causing intense itchiness and sleeplessness. RINVOQ START TODAY. samples provided in office.\\n7/5/22: Patient reports itching was much better when taking Rinvoq. Decreased itch factor from 6-7, down to a 2-3. Patient reports light headedness, nausea, indigestion, twitching in her chest while taking Rinvoq. RINVOQ D/C. She stopped it a couple days ago due to symptoms. She is still recovering from the side effects. Recommend starting Opzelura at this point. Consider restarting Rinvoq every other day once symptoms have resolved. Discussed Vtama with patient as well if Opzelura is not improving her symptoms.\\n8/5/22: Pt reports itching has not improved since starting opzelura. Pt to RESTART DUPIXENT TODAY with loading dose after todays OV, sample given to Pt (1 300mg SC injection, pt states she has 1 pen at home). Rx sent to [facility].\\n9/2/22: pt states doing a little better after restarting Dupixent. Minor dry eyes and using Vytone and Olux foam topically currently. Will give pt Vtama sample today. Discussed considering restarting Rinvoq in future at lower dose. Patient states it worked the best but she complained of N/V and mild palpitations.\\n11/10/22: unimproved. First week after injection is good, then she itches again. Using a variety of topicals. Recommend trying Dermeleve cream and Remedy or glove in a bottle. Samples given. She will try injecting every week (supported by samples) x 1 month.\\n1/4/23: worse again. 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 Discussed restarting  Rinvoq at lower dosing vs prednisone taper. She reports insomnia with it. Patient agreeable to prednisone taper. Discussed MTX with patient. Will consider at follow up. Ears very itchy.\\n1/24/23: pt did not tolerate prednisone well, says eczema is unchanged. Itching on ears and shoulders today. Discussed MTX today and test dosage. Discussed regulatory blood work every month while on MTX. Pt would like to start MTX, will send Rx today. Pt to start 1/26. Will discontinue dupixent for now. MTX START TODAY.\\n2/22/23: pt fatigued and foggy on MTX. worse on dose day, better throughout the week. Ears look normal on exam today. Consider inc dosage at f/u if not further improved\\n3/21/23: pt reports less foggy and fatigue on current regimen. Continue same dose of MTX for another month, plan to increase NOV pending labs WNL.\\n4/4/23: new rash armpits. C/w ACD/ICD/eczema. Treat with TAC 0.1%. AD overall getting better. Less itching. Continue MTX per current regimen. Today not evaluated. Work in for rash.\\n04/21/23: pt reports rash under armpits has resolved. States she noticed a new rash yesterday also under LT underarm. As of today we will be increasing MTX dosage to 17.5/ week. Pt to take 3 QAM and 4 QPM only on fridays. Pt reports improvement in itching but still remains moderate to severe (5 to 6). Labs reviewed in OV, lab slip provided to pt for next OV.\\n5/23/23: unimproved. Recommend Olux foam to scalp. Recommend increase MTX to 4 pills BID. Patient agreeable.\\n6/20/23: pt stable. She reports side effects including fogginess and headaches with increased MTX dosage. Recommended continuing on current dosage and observe for resolution of side effects. Discussed patch testing in the fall.\\n07/19/23: pt reports stable. Still feeling nauseous and foggy but tolerable. Discussed continuing current regimen, pt agreeable.\\n8/22/23: Pt reports stable most days with itching mostly under control. Discussed continuing current regimen and Pt agreeable.\\n10/24/23: inadequately controlled. Discussed other tx options. Consider Gabapentin. Patient agreeable to START GABAPENTIN TODAY. Rx given. Continue methotrexate sodium 2.5 mg tablet: Take 4 tablets PO BID every Friday. Re check labs in 2 months.\\n11/27/23: pt reports a flare under the armpits and ear but is using Ketoconazole cream and triamcinalone which is knocking down the flare. Pt reports only being able to tolerate Gabapentin 300mg 1 PO QD before bedtime. Pt has increased fogginess with the gabapentin. Discussed starting Cibinqo in the future but will try current regimen for 1 more month to see if itching improves even more. Plan to continue topicals, gabapentin and methotrexate.\\n12/11/23: pt comes in today with nail thickening and pain, discussed with pt that the nail is most likely due to trauma. Also discussed that because pt is on methotrexate it makes her immunosuppressed which could lead to infections so advised Pt to keep an eye on the toe and watch out for any erythema or irritation. Continue current regimen and f/u as scheduled.\\n1/3/23: pt states skin is doing well. Since LOV Pt did have a flare under the armpits but was well controlled with TAC and Ketoconazole. Pt doing well on current regimen and has been able to increase gabapentin to 300mg PO BID. Pt still having toe pain so will begin treatment. Discussed going every other month on the bloodwork, Pt agreeable. F/u with pt in a month.\\n2/6/24: pt states skin is doing much better for this time of year than in past years. Pt states that she has had a few flares but is well controlled with topicals. Plan to continue on current regimen and f/u in 2 months.\\n4/9/24: pt states she has had quite a few flares since LOV, thinks it’s due to pollen. Discussed staying on current dosage of methotrexate vs increasing dosage, pt would like to continue on current dosage. Using olux (ears), TAC and opzelura as needed. Pt still having issues with nail, vikor testing done today. Plan to continue current regimen and f/u in 2 months (give pt new standing order at NOV, will start drawing blood Q3 months.\\n9/10/24: pt states flaring on tops of both hands with itching, as well as under the arms. Pt to continue using current topicals and pills. Nails doing well with improved growth. Pt states ears doing well, discussed increasing methotrexate dosage, pt would like to continue on current dosage due to brain fog on current dose.\\n12/10/2024: The patient is stable. The patient has noticed some flares on the hands and bilateral axilla (“due to Cedar Season”). The patient has noticed some increased itching. The patient is well controlled on her current regimen with mild side effects (drowsiness and brain fog for days after taking Methotrexate). At this time, the patient states that the nails are doing well with nothing of significance to report. The patient complains of joint pain (elbows) in the evening. The patient was referred to Dr. Chanel for further evaluation and management.
Detail Level: Zone

## 2024-12-10 NOTE — PROCEDURE: LIQUID NITROGEN
Detail Level: Detailed
Show Spray Paint Technique Variable?: Yes
Post-Care Instructions: I reviewed with the patient in detail post-care instructions. Patient is to wear sunprotection, and avoid picking at any of the treated lesions. Patient may apply Vaseline to crusted or scabbed areas.
Spray Paint Text: The liquid nitrogen was applied to the skin utilizing a spray paint frosting technique.
Add 52 Modifier (Optional): no
Medical Necessity Information: It is in your best interest to select a reason for this procedure from the list below. All of these items fulfill various CMS LCD requirements except the new and changing color options.
Consent: The patient's consent was obtained including but not limited to risks of crusting, scabbing, blistering, scarring, darker or lighter pigmentary change, recurrence, incomplete removal and infection.
Number Of Freeze-Thaw Cycles: 2 freeze-thaw cycles
Medical Necessity Clause: This procedure was medically necessary because the lesions that were treated were intensely:

## 2024-12-10 NOTE — PROCEDURE: TREATMENT REGIMEN
Detail Level: Generalized
Plan: The patient currently has a standing order. Last labs 10/28/24 WNL. Plan to get blood work end of January 2025.

## 2025-01-02 ENCOUNTER — RX ONLY (RX ONLY)
Age: 60
End: 2025-01-02

## 2025-01-02 RX ORDER — FOLIC ACID 1 MG/1
TABLET ORAL
Qty: 90 | Refills: 1 | Status: ERX

## 2025-01-02 RX ORDER — GABAPENTIN 300 MG/1
CAPSULE ORAL
Qty: 90 | Refills: 1 | Status: ERX

## 2025-01-17 ENCOUNTER — RX ONLY (RX ONLY)
Age: 60
End: 2025-01-17

## 2025-01-17 RX ORDER — METHOTREXATE SODIUM 2.5 MG/1
TABLET ORAL
Qty: 32 | Refills: 1 | Status: ERX

## 2025-03-11 ENCOUNTER — APPOINTMENT (OUTPATIENT)
Dept: URBAN - METROPOLITAN AREA CLINIC 125 | Facility: CLINIC | Age: 60
Setting detail: DERMATOLOGY
End: 2025-03-11

## 2025-03-11 DIAGNOSIS — L60.3 NAIL DYSTROPHY: ICD-10-CM

## 2025-03-11 DIAGNOSIS — Z79.899 OTHER LONG TERM (CURRENT) DRUG THERAPY: ICD-10-CM

## 2025-03-11 DIAGNOSIS — L20.89 OTHER ATOPIC DERMATITIS: ICD-10-CM | Status: STABLE

## 2025-03-11 PROBLEM — L60.9 NAIL DISORDER, UNSPECIFIED: Status: ACTIVE | Noted: 2025-03-11

## 2025-03-11 PROCEDURE — ? CHRONOLOGY OF PRESENT ILLNESS

## 2025-03-11 PROCEDURE — ? METHOTREXATE MONITORING

## 2025-03-11 PROCEDURE — ? TREATMENT REGIMEN

## 2025-03-11 PROCEDURE — ? PRESCRIPTION MEDICATION MANAGEMENT

## 2025-03-11 PROCEDURE — G2211 COMPLEX E/M VISIT ADD ON: HCPCS

## 2025-03-11 PROCEDURE — ? VISIT COMPLEXITY

## 2025-03-11 PROCEDURE — 99214 OFFICE O/P EST MOD 30 MIN: CPT

## 2025-03-11 PROCEDURE — ? HIGH RISK MEDICATION MONITORING

## 2025-03-11 PROCEDURE — ? COUNSELING

## 2025-03-11 ASSESSMENT — LOCATION SIMPLE DESCRIPTION DERM
LOCATION SIMPLE: LEFT EAR
LOCATION SIMPLE: RIGHT FOREARM
LOCATION SIMPLE: LEFT GREAT TOE
LOCATION SIMPLE: RIGHT HAND
LOCATION SIMPLE: LEFT HAND
LOCATION SIMPLE: LEFT FOREARM
LOCATION SIMPLE: LEFT SCALP
LOCATION SIMPLE: LEFT AXILLARY VAULT
LOCATION SIMPLE: RIGHT EAR

## 2025-03-11 ASSESSMENT — LOCATION ZONE DERM
LOCATION ZONE: ARM
LOCATION ZONE: AXILLAE
LOCATION ZONE: HAND
LOCATION ZONE: SCALP
LOCATION ZONE: EAR
LOCATION ZONE: TOE

## 2025-03-11 ASSESSMENT — LOCATION DETAILED DESCRIPTION DERM
LOCATION DETAILED: RIGHT ULNAR DORSAL HAND
LOCATION DETAILED: LEFT LATERAL GREAT TOE
LOCATION DETAILED: LEFT MEDIAL FRONTAL SCALP
LOCATION DETAILED: LEFT VENTRAL PROXIMAL FOREARM
LOCATION DETAILED: RIGHT VENTRAL PROXIMAL FOREARM
LOCATION DETAILED: RIGHT CYMBA CONCHA
LOCATION DETAILED: LEFT INFERIOR CRUS OF ANTIHELIX
LOCATION DETAILED: LEFT AXILLARY VAULT
LOCATION DETAILED: LEFT RADIAL DORSAL HAND

## 2025-03-11 NOTE — PROCEDURE: HIGH RISK MEDICATION MONITORING
Itraconazole Counseling:  I discussed with the patient the risks of itraconazole including but not limited to liver damage, nausea/vomiting, neuropathy, and severe allergy.  The patient understands that this medication is best absorbed when taken with acidic beverages such as non-diet cola or ginger ale.  The patient understands that monitoring is required including baseline LFTs and repeat LFTs at intervals.  The patient understands that they are to contact us or the primary physician if concerning signs are noted.
Azithromycin Counseling:  I discussed with the patient the risks of azithromycin including but not limited to GI upset, allergic reaction, drug rash, diarrhea, and yeast infections.
Ivermectin Pregnancy And Lactation Text: This medication is Pregnancy Category C and it isn't known if it is safe during pregnancy. It is also excreted in breast milk.
Infliximab Pregnancy And Lactation Text: This medication is Pregnancy Category B and is considered safe during pregnancy. It is unknown if this medication is excreted in breast milk.
Xolair Counseling:  Patient informed of potential adverse effects including but not limited to fever, muscle aches, rash and allergic reactions.  The patient verbalized understanding of the proper use and possible adverse effects of Xolair.  All of the patient's questions and concerns were addressed.
Bexarotene Counseling:  I discussed with the patient the risks of bexarotene including but not limited to hair loss, dry lips/skin/eyes, liver abnormalities, hyperlipidemia, pancreatitis, depression/suicidal ideation, photosensitivity, drug rash/allergic reactions, hypothyroidism, anemia, leukopenia, infection, cataracts, and teratogenicity.  Patient understands that they will need regular blood tests to check lipid profile, liver function tests, white blood cell count, thyroid function tests and pregnancy test if applicable.
Hydroxychloroquine Pregnancy And Lactation Text: This medication has been shown to cause fetal harm but it isn't assigned a Pregnancy Risk Category. There are small amounts excreted in breast milk.
Xolair Pregnancy And Lactation Text: This medication is Pregnancy Category B and is considered safe during pregnancy. This medication is excreted in breast milk.
Nsaids Counseling: NSAID Counseling: I discussed with the patient that NSAIDs should be taken with food. Prolonged use of NSAIDs can result in the development of stomach ulcers.  Patient advised to stop taking NSAIDs if abdominal pain occurs.  The patient verbalized understanding of the proper use and possible adverse effects of NSAIDs.  All of the patient's questions and concerns were addressed.
Valtrex Pregnancy And Lactation Text: this medication is Pregnancy Category B and is considered safe during pregnancy. This medication is not directly found in breast milk but it's metabolite acyclovir is present.
Eucrisa Pregnancy And Lactation Text: This medication has not been assigned a Pregnancy Risk Category but animal studies failed to show danger with the topical medication. It is unknown if the medication is excreted in breast milk.
Quinolones Pregnancy And Lactation Text: This medication is Pregnancy Category C and it isn't know if it is safe during pregnancy. It is also excreted in breast milk.
Rituxan Counseling:  I discussed with the patient the risks of Rituxan infusions. Side effects can include infusion reactions, severe drug rashes including mucocutaneous reactions, reactivation of latent hepatitis and other infections and rarely progressive multifocal leukoencephalopathy.  All of the patient's questions and concerns were addressed.
Topical Clindamycin Pregnancy And Lactation Text: This medication is Pregnancy Category B and is considered safe during pregnancy. It is unknown if it is excreted in breast milk.
Bexarotene Pregnancy And Lactation Text: This medication is Pregnancy Category X and should not be given to women who are pregnant or may become pregnant. This medication should not be used if you are breast feeding.
Nsaids Pregnancy And Lactation Text: These medications are considered safe up to 30 weeks gestation. It is excreted in breast milk.
Topical Sulfur Applications Counseling: Topical Sulfur Counseling: Patient counseled that this medication may cause skin irritation or allergic reactions.  In the event of skin irritation, the patient was advised to reduce the amount of the drug applied or use it less frequently.   The patient verbalized understanding of the proper use and possible adverse effects of topical sulfur application.  All of the patient's questions and concerns were addressed.
Rifampin Counseling: I discussed with the patient the risks of rifampin including but not limited to liver damage, kidney damage, red-orange body fluids, nausea/vomiting and severe allergy.
Azithromycin Pregnancy And Lactation Text: This medication is considered safe during pregnancy and is also secreted in breast milk.
Hydroquinone Counseling:  Patient advised that medication may result in skin irritation, lightening (hypopigmentation), dryness, and burning.  In the event of skin irritation, the patient was advised to reduce the amount of the drug applied or use it less frequently.  Rarely, spots that are treated with hydroquinone can become darker (pseudoochronosis).  Should this occur, patient instructed to stop medication and call the office. The patient verbalized understanding of the proper use and possible adverse effects of hydroquinone.  All of the patient's questions and concerns were addressed.
Bactrim Counseling:  I discussed with the patient the risks of sulfa antibiotics including but not limited to GI upset, allergic reaction, drug rash, diarrhea, dizziness, photosensitivity, and yeast infections.  Rarely, more serious reactions can occur including but not limited to aplastic anemia, agranulocytosis, methemoglobinemia, blood dyscrasias, liver or kidney failure, lung infiltrates or desquamative/blistering drug rashes.
Ketoconazole Counseling:   Patient counseled regarding improving absorption with orange juice.  Adverse effects include but are not limited to breast enlargement, headache, diarrhea, nausea, upset stomach, liver function test abnormalities, taste disturbance, and stomach pain.  There is a rare possibility of liver failure that can occur when taking ketoconazole. The patient understands that monitoring of LFTs may be required, especially at baseline. The patient verbalized understanding of the proper use and possible adverse effects of ketoconazole.  All of the patient's questions and concerns were addressed.
Isotretinoin Counseling: Patient should get monthly blood tests, not donate blood, not drive at night if vision affected, not share medication, and not undergo elective surgery for 6 months after tx completed. Side effects reviewed, pt to contact office should one occur.
Use Enhanced Medication Counseling?: No
Odomzo Counseling- I discussed with the patient the risks of Odomzo including but not limited to nausea, vomiting, diarrhea, constipation, weight loss, changes in the sense of taste, decreased appetite, muscle spasms, and hair loss.  The patient verbalized understanding of the proper use and possible adverse effects of Odomzo.  All of the patient's questions and concerns were addressed.
Topical Sulfur Applications Pregnancy And Lactation Text: This medication is Pregnancy Category C and has an unknown safety profile during pregnancy. It is unknown if this topical medication is excreted in breast milk.
Rituxan Pregnancy And Lactation Text: This medication is Pregnancy Category C and it isn't know if it is safe during pregnancy. It is unknown if this medication is excreted in breast milk but similar antibodies are known to be excreted.
Azathioprine Counseling:  I discussed with the patient the risks of azathioprine including but not limited to myelosuppression, immunosuppression, hepatotoxicity, lymphoma, and infections.  The patient understands that monitoring is required including baseline LFTs, Creatinine, possible TPMP genotyping and weekly CBCs for the first month and then every 2 weeks thereafter.  The patient verbalized understanding of the proper use and possible adverse effects of azathioprine.  All of the patient's questions and concerns were addressed.
Azathioprine Pregnancy And Lactation Text: This medication is Pregnancy Category D and isn't considered safe during pregnancy. It is unknown if this medication is excreted in breast milk.
Ketoconazole Pregnancy And Lactation Text: This medication is Pregnancy Category C and it isn't know if it is safe during pregnancy. It is also excreted in breast milk and breast feeding isn't recommended.
Siliq Counseling:  I discussed with the patient the risks of Siliq including but not limited to new or worsening depression, suicidal thoughts and behavior, immunosuppression, malignancy, posterior leukoencephalopathy syndrome, and serious infections.  The patient understands that monitoring is required including a PPD at baseline and must alert us or the primary physician if symptoms of infection or other concerning signs are noted. There is also a special program designed to monitor depression which is required with Siliq.
Bactrim Pregnancy And Lactation Text: This medication is Pregnancy Category D and is known to cause fetal risk.  It is also excreted in breast milk.
Isotretinoin Pregnancy And Lactation Text: This medication is Pregnancy Category X and is considered extremely dangerous during pregnancy. It is unknown if it is excreted in breast milk.
High Dose Vitamin A Counseling: Side effects reviewed, pt to contact office should one occur.
Odomzo Pregnancy And Lactation Text: This medication is Pregnancy Category X and is absolutely contraindicated during pregnancy. It is unknown if it is excreted in breast milk.
Zyclara Counseling:  I discussed with the patient the risks of imiquimod including but not limited to erythema, scaling, itching, weeping, crusting, and pain.  Patient understands that the inflammatory response to imiquimod is variable from person to person and was educated regarded proper titration schedule.  If flu-like symptoms develop, patient knows to discontinue the medication and contact us.
Imiquimod Counseling:  I discussed with the patient the risks of imiquimod including but not limited to erythema, scaling, itching, weeping, crusting, and pain.  Patient understands that the inflammatory response to imiquimod is variable from person to person and was educated regarded proper titration schedule.  If flu-like symptoms develop, patient knows to discontinue the medication and contact us.
Imiquimod Pregnancy And Lactation Text: This medication is Pregnancy Category C. It is unknown if this medication is excreted in breast milk.
Cephalexin Counseling: I counseled the patient regarding use of cephalexin as an antibiotic for prophylactic and/or therapeutic purposes. Cephalexin (commonly prescribed under brand name Keflex) is a cephalosporin antibiotic which is active against numerous classes of bacteria, including most skin bacteria. Side effects may include nausea, diarrhea, gastrointestinal upset, rash, hives, yeast infections, and in rare cases, hepatitis, kidney disease, seizures, fever, confusion, neurologic symptoms, and others. Patients with severe allergies to penicillin medications are cautioned that there is about a 10% incidence of cross-reactivity with cephalosporins. When possible, patients with penicillin allergies should use alternatives to cephalosporins for antibiotic therapy.
Siliq Pregnancy And Lactation Text: The risk during pregnancy and breastfeeding is uncertain with this medication.
Terbinafine Counseling: Patient counseling regarding adverse effects of terbinafine including but not limited to headache, diarrhea, rash, upset stomach, liver function test abnormalities, itching, taste/smell disturbance, nausea, abdominal pain, and flatulence.  There is a rare possibility of liver failure that can occur when taking terbinafine.  The patient understands that a baseline LFT and kidney function test may be required. The patient verbalized understanding of the proper use and possible adverse effects of terbinafine.  All of the patient's questions and concerns were addressed.
Cellcept Counseling:  I discussed with the patient the risks of mycophenolate mofetil including but not limited to infection/immunosuppression, GI upset, hypokalemia, hypercholesterolemia, bone marrow suppression, lymphoproliferative disorders, malignancy, GI ulceration/bleed/perforation, colitis, interstitial lung disease, kidney failure, progressive multifocal leukoencephalopathy, and birth defects.  The patient understands that monitoring is required including a baseline creatinine and regular CBC testing. In addition, patient must alert us immediately if symptoms of infection or other concerning signs are noted.
High Dose Vitamin A Pregnancy And Lactation Text: High dose vitamin A therapy is contraindicated during pregnancy and breast feeding.
Cimzia Counseling:  I discussed with the patient the risks of Cimzia including but not limited to immunosuppression, allergic reactions and infections.  The patient understands that monitoring is required including a PPD at baseline and must alert us or the primary physician if symptoms of infection or other concerning signs are noted.
Otezla Counseling: The side effects of Otezla were discussed with the patient, including but not limited to worsening or new depression, weight loss, diarrhea, nausea, upper respiratory tract infection, and headache. Patient instructed to call the office should any adverse effect occur.  The patient verbalized understanding of the proper use and possible adverse effects of Otezla.  All the patient's questions and concerns were addressed.
Terbinafine Pregnancy And Lactation Text: This medication is Pregnancy Category B and is considered safe during pregnancy. It is also excreted in breast milk and breast feeding isn't recommended.
Otezla Pregnancy And Lactation Text: This medication is Pregnancy Category C and it isn't known if it is safe during pregnancy. It is unknown if it is excreted in breast milk.
Minoxidil Counseling: Minoxidil is a topical medication which can increase blood flow where it is applied. It is uncertain how this medication increases hair growth. Side effects are uncommon and include stinging and allergic reactions.
Detail Level: Generalized
Simponi Counseling:  I discussed with the patient the risks of golimumab including but not limited to myelosuppression, immunosuppression, autoimmune hepatitis, demyelinating diseases, lymphoma, and serious infections.  The patient understands that monitoring is required including a PPD at baseline and must alert us or the primary physician if symptoms of infection or other concerning signs are noted.
Cephalexin Pregnancy And Lactation Text: This medication is Pregnancy Category B and considered safe during pregnancy.  It is also excreted in breast milk but can be used safely for shorter doses.
Cimzia Pregnancy And Lactation Text: This medication crosses the placenta but can be considered safe in certain situations. Cimzia may be excreted in breast milk.
Oxybutynin Counseling:  I discussed with the patient the risks of oxybutynin including but not limited to skin rash, drowsiness, dry mouth, difficulty urinating, and blurred vision.
Cyclophosphamide Counseling:  I discussed with the patient the risks of cyclophosphamide including but not limited to hair loss, hormonal abnormalities, decreased fertility, abdominal pain, diarrhea, nausea and vomiting, bone marrow suppression and infection. The patient understands that monitoring is required while taking this medication.
Clindamycin Counseling: I counseled the patient regarding use of clindamycin as an antibiotic for prophylactic and/or therapeutic purposes. Clindamycin is active against numerous classes of bacteria, including skin bacteria. Side effects may include nausea, diarrhea, gastrointestinal upset, rash, hives, yeast infections, and in rare cases, colitis.
Cimetidine Counseling:  I discussed with the patient the risks of Cimetidine including but not limited to gynecomastia, headache, diarrhea, nausea, drowsiness, arrhythmias, pancreatitis, skin rashes, psychosis, bone marrow suppression and kidney toxicity.
Benzoyl Peroxide Counseling: Patient counseled that medicine may cause skin irritation and bleach clothing.  In the event of skin irritation, the patient was advised to reduce the amount of the drug applied or use it less frequently.   The patient verbalized understanding of the proper use and possible adverse effects of benzoyl peroxide.  All of the patient's questions and concerns were addressed.
Picato Counseling:  I discussed with the patient the risks of Picato including but not limited to erythema, scaling, itching, weeping, crusting, and pain.
Rifampin Pregnancy And Lactation Text: This medication is Pregnancy Category C and it isn't know if it is safe during pregnancy. It is also excreted in breast milk and should not be used if you are breast feeding.
Clindamycin Pregnancy And Lactation Text: This medication can be used in pregnancy if certain situations. Clindamycin is also present in breast milk.
Arava Counseling:  Patient counseled regarding adverse effects of Arava including but not limited to nausea, vomiting, abnormalities in liver function tests. Patients may develop mouth sores, rash, diarrhea, and abnormalities in blood counts. The patient understands that monitoring is required including LFTs and blood counts.  There is a rare possibility of scarring of the liver and lung problems that can occur when taking methotrexate. Persistent nausea, loss of appetite, pale stools, dark urine, cough, and shortness of breath should be reported immediately. Patient advised to discontinue Arava treatment and consult with a physician prior to attempting conception. The patient will have to undergo a treatment to eliminate Arava from the body prior to conception.
Cosentyx Counseling:  I discussed with the patient the risks of Cosentyx including but not limited to worsening of Crohn's disease, immunosuppression, allergic reactions and infections.  The patient understands that monitoring is required including a PPD at baseline and must alert us or the primary physician if symptoms of infection or other concerning signs are noted.
Cyclophosphamide Pregnancy And Lactation Text: This medication is Pregnancy Category D and it isn't considered safe during pregnancy. This medication is excreted in breast milk.
Skyrizi Counseling: I discussed with the patient the risks of risankizumab-rzaa including but not limited to immunosuppression, and serious infections.  The patient understands that monitoring is required including a PPD at baseline and must alert us or the primary physician if symptoms of infection or other concerning signs are noted.
Cyclosporine Counseling:  I discussed with the patient the risks of cyclosporine including but not limited to hypertension, gingival hyperplasia,myelosuppression, immunosuppression, liver damage, kidney damage, neurotoxicity, lymphoma, and serious infections. The patient understands that monitoring is required including baseline blood pressure, CBC, CMP, lipid panel and uric acid, and then 1-2 times monthly CMP and blood pressure.
Dapsone Counseling: I discussed with the patient the risks of dapsone including but not limited to hemolytic anemia, agranulocytosis, rashes, methemoglobinemia, kidney failure, peripheral neuropathy, headaches, GI upset, and liver toxicity.  Patients who start dapsone require monitoring including baseline LFTs and weekly CBCs for the first month, then every month thereafter.  The patient verbalized understanding of the proper use and possible adverse effects of dapsone.  All of the patient's questions and concerns were addressed.
Sarecycline Counseling: Patient advised regarding possible photosensitivity and discoloration of the teeth, skin, lips, tongue and gums.  Patient instructed to avoid sunlight, if possible.  When exposed to sunlight, patients should wear protective clothing, sunglasses, and sunscreen.  The patient was instructed to call the office immediately if the following severe adverse effects occur:  hearing changes, easy bruising/bleeding, severe headache, or vision changes.  The patient verbalized understanding of the proper use and possible adverse effects of sarecycline.  All of the patient's questions and concerns were addressed.
Benzoyl Peroxide Pregnancy And Lactation Text: This medication is Pregnancy Category C. It is unknown if benzoyl peroxide is excreted in breast milk.
Carac Counseling:  I discussed with the patient the risks of Carac including but not limited to erythema, scaling, itching, weeping, crusting, and pain.
Birth Control Pills Counseling: Birth Control Pill Counseling: I discussed with the patient the potential side effects of OCPs including but not limited to increased risk of stroke, heart attack, thrombophlebitis, deep venous thrombosis, hepatic adenomas, breast changes, GI upset, headaches, and depression.  The patient verbalized understanding of the proper use and possible adverse effects of OCPs. All of the patient's questions and concerns were addressed.
Dupixent Counseling: I discussed with the patient the risks of dupilumab including but not limited to eye infection and irritation, cold sores, injection site reactions, worsening of asthma, allergic reactions and increased risk of parasitic infection.  Live vaccines should be avoided while taking dupilumab. Dupilumab will also interact with certain medications such as warfarin and cyclosporine. The patient understands that monitoring is required and they must alert us or the primary physician if symptoms of infection or other concerning signs are noted.
Doxycycline Counseling:  Patient counseled regarding possible photosensitivity and increased risk for sunburn.  Patient instructed to avoid sunlight, if possible.  When exposed to sunlight, patients should wear protective clothing, sunglasses, and sunscreen.  The patient was instructed to call the office immediately if the following severe adverse effects occur:  hearing changes, easy bruising/bleeding, severe headache, or vision changes.  The patient verbalized understanding of the proper use and possible adverse effects of doxycycline.  All of the patient's questions and concerns were addressed.
Clofazimine Counseling:  I discussed with the patient the risks of clofazimine including but not limited to skin and eye pigmentation, liver damage, nausea/vomiting, gastrointestinal bleeding and allergy.
Protopic Counseling: Patient may experience a mild burning sensation during topical application. Protopic is not approved in children less than 2 years of age. There have been case reports of hematologic and skin malignancies in patients using topical calcineurin inhibitors although causality is questionable.
Sarecycline Pregnancy And Lactation Text: This medication is Pregnancy Category D and not consider safe during pregnancy. It is also excreted in breast milk.
Doxycycline Pregnancy And Lactation Text: This medication is Pregnancy Category D and not consider safe during pregnancy. It is also excreted in breast milk but is considered safe for shorter treatment courses.
Stelara Counseling:  I discussed with the patient the risks of ustekinumab including but not limited to immunosuppression, malignancy, posterior leukoencephalopathy syndrome, and serious infections.  The patient understands that monitoring is required including a PPD at baseline and must alert us or the primary physician if symptoms of infection or other concerning signs are noted.
Dapsone Pregnancy And Lactation Text: This medication is Pregnancy Category C and is not considered safe during pregnancy or breast feeding.
Clofazimine Pregnancy And Lactation Text: This medication is Pregnancy Category C and isn't considered safe during pregnancy. It is excreted in breast milk.
Dupixent Pregnancy And Lactation Text: This medication likely crosses the placenta but the risk for the fetus is uncertain. This medication is excreted in breast milk.
Carac Pregnancy And Lactation Text: This medication is Pregnancy Category X and contraindicated in pregnancy and in women who may become pregnant. It is unknown if this medication is excreted in breast milk.
Cyclosporine Pregnancy And Lactation Text: This medication is Pregnancy Category C and it isn't know if it is safe during pregnancy. This medication is excreted in breast milk.
Doxepin Counseling:  Patient advised that the medication is sedating and not to drive a car after taking this medication. Patient informed of potential adverse effects including but not limited to dry mouth, urinary retention, and blurry vision.  The patient verbalized understanding of the proper use and possible adverse effects of doxepin.  All of the patient's questions and concerns were addressed.
Birth Control Pills Pregnancy And Lactation Text: This medication should be avoided if pregnant and for the first 30 days post-partum.
Tetracycline Counseling: Patient counseled regarding possible photosensitivity and increased risk for sunburn.  Patient instructed to avoid sunlight, if possible.  When exposed to sunlight, patients should wear protective clothing, sunglasses, and sunscreen.  The patient was instructed to call the office immediately if the following severe adverse effects occur:  hearing changes, easy bruising/bleeding, severe headache, or vision changes.  The patient verbalized understanding of the proper use and possible adverse effects of tetracycline.  All of the patient's questions and concerns were addressed. Patient understands to avoid pregnancy while on therapy due to potential birth defects.
Doxepin Pregnancy And Lactation Text: This medication is Pregnancy Category C and it isn't known if it is safe during pregnancy. It is also excreted in breast milk and breast feeding isn't recommended.
Spironolactone Counseling: Patient advised regarding risks of diarrhea, abdominal pain, hyperkalemia, birth defects (for female patients), liver toxicity and renal toxicity. The patient may need blood work to monitor liver and kidney function and potassium levels while on therapy. The patient verbalized understanding of the proper use and possible adverse effects of spironolactone.  All of the patient's questions and concerns were addressed.
Erivedge Counseling- I discussed with the patient the risks of Erivedge including but not limited to nausea, vomiting, diarrhea, constipation, weight loss, changes in the sense of taste, decreased appetite, muscle spasms, and hair loss.  The patient verbalized understanding of the proper use and possible adverse effects of Erivedge.  All of the patient's questions and concerns were addressed.
Protopic Pregnancy And Lactation Text: This medication is Pregnancy Category C. It is unknown if this medication is excreted in breast milk when applied topically.
Taltz Counseling: I discussed with the patient the risks of ixekizumab including but not limited to immunosuppression, serious infections, worsening of inflammatory bowel disease and drug reactions.  The patient understands that monitoring is required including a PPD at baseline and must alert us or the primary physician if symptoms of infection or other concerning signs are noted.
5-Fu Counseling: 5-Fluorouracil Counseling:  I discussed with the patient the risks of 5-fluorouracil including but not limited to erythema, scaling, itching, weeping, crusting, and pain.
Enbrel Counseling:  I discussed with the patient the risks of etanercept including but not limited to myelosuppression, immunosuppression, autoimmune hepatitis, demyelinating diseases, lymphoma, and infections.  The patient understands that monitoring is required including a PPD at baseline and must alert us or the primary physician if symptoms of infection or other concerning signs are noted.
Colchicine Counseling:  Patient counseled regarding adverse effects including but not limited to stomach upset (nausea, vomiting, stomach pain, or diarrhea).  Patient instructed to limit alcohol consumption while taking this medication.  Colchicine may reduce blood counts especially with prolonged use.  The patient understands that monitoring of kidney function and blood counts may be required, especially at baseline. The patient verbalized understanding of the proper use and possible adverse effects of colchicine.  All of the patient's questions and concerns were addressed.
Methotrexate Counseling:  Patient counseled regarding adverse effects of methotrexate including but not limited to nausea, vomiting, abnormalities in liver function tests. Patients may develop mouth sores, rash, diarrhea, and abnormalities in blood counts. The patient understands that monitoring is required including LFT's and blood counts.  There is a rare possibility of scarring of the liver and lung problems that can occur when taking methotrexate. Persistent nausea, loss of appetite, pale stools, dark urine, cough, and shortness of breath should be reported immediately. Patient advised to discontinue methotrexate treatment at least three months before attempting to become pregnant.  I discussed the need for folate supplements while taking methotrexate.  These supplements can decrease side effects during methotrexate treatment. The patient verbalized understanding of the proper use and possible adverse effects of methotrexate.  All of the patient's questions and concerns were addressed.
Erythromycin Counseling:  I discussed with the patient the risks of erythromycin including but not limited to GI upset, allergic reaction, drug rash, diarrhea, increase in liver enzymes, and yeast infections.
Spironolactone Pregnancy And Lactation Text: This medication can cause feminization of the male fetus and should be avoided during pregnancy. The active metabolite is also found in breast milk.
Erythromycin Pregnancy And Lactation Text: This medication is Pregnancy Category B and is considered safe during pregnancy. It is also excreted in breast milk.
Methotrexate Pregnancy And Lactation Text: This medication is Pregnancy Category X and is known to cause fetal harm. This medication is excreted in breast milk.
Hydroxyzine Counseling: Patient advised that the medication is sedating and not to drive a car after taking this medication.  Patient informed of potential adverse effects including but not limited to dry mouth, urinary retention, and blurry vision.  The patient verbalized understanding of the proper use and possible adverse effects of hydroxyzine.  All of the patient's questions and concerns were addressed.
Solaraze Counseling:  I discussed with the patient the risks of Solaraze including but not limited to erythema, scaling, itching, weeping, crusting, and pain.
Solaraze Pregnancy And Lactation Text: This medication is Pregnancy Category B and is considered safe. There is some data to suggest avoiding during the third trimester. It is unknown if this medication is excreted in breast milk.
Hydroxyzine Pregnancy And Lactation Text: This medication is not safe during pregnancy and should not be taken. It is also excreted in breast milk and breast feeding isn't recommended.
Metronidazole Counseling:  I discussed with the patient the risks of metronidazole including but not limited to seizures, nausea/vomiting, a metallic taste in the mouth, nausea/vomiting and severe allergy.
Humira Counseling:  I discussed with the patient the risks of adalimumab including but not limited to myelosuppression, immunosuppression, autoimmune hepatitis, demyelinating diseases, lymphoma, and serious infections.  The patient understands that monitoring is required including a PPD at baseline and must alert us or the primary physician if symptoms of infection or other concerning signs are noted.
Prednisone Counseling:  I discussed with the patient the risks of prolonged use of prednisone including but not limited to weight gain, insomnia, osteoporosis, mood changes, diabetes, susceptibility to infection, glaucoma and high blood pressure.  In cases where prednisone use is prolonged, patients should be monitored with blood pressure checks, serum glucose levels and an eye exam.  Additionally, the patient may need to be placed on GI prophylaxis, PCP prophylaxis, and calcium and vitamin D supplementation and/or a bisphosphonate.  The patient verbalized understanding of the proper use and the possible adverse effects of prednisone.  All of the patient's questions and concerns were addressed.
Gabapentin Counseling: I discussed with the patient the risks of gabapentin including but not limited to dizziness, somnolence, fatigue and ataxia.
Tremfya Counseling: I discussed with the patient the risks of guselkumab including but not limited to immunosuppression, serious infections, worsening of inflammatory bowel disease and drug reactions.  The patient understands that monitoring is required including a PPD at baseline and must alert us or the primary physician if symptoms of infection or other concerning signs are noted.
SSKI Counseling:  I discussed with the patient the risks of SSKI including but not limited to thyroid abnormalities, metallic taste, GI upset, fever, headache, acne, arthralgias, paraesthesias, lymphadenopathy, easy bleeding, arrhythmias, and allergic reaction.
Drysol Counseling:  I discussed with the patient the risks of drysol/aluminum chloride including but not limited to skin rash, itching, irritation, burning.
Drysol Pregnancy And Lactation Text: This medication is considered safe during pregnancy and breast feeding.
Sski Pregnancy And Lactation Text: This medication is Pregnancy Category D and isn't considered safe during pregnancy. It is excreted in breast milk.
Topical Retinoid counseling:  Patient advised to apply a pea-sized amount only at bedtime and wait 30 minutes after washing their face before applying.  If too drying, patient may add a non-comedogenic moisturizer. The patient verbalized understanding of the proper use and possible adverse effects of retinoids.  All of the patient's questions and concerns were addressed.
Fluconazole Counseling:  Patient counseled regarding adverse effects of fluconazole including but not limited to headache, diarrhea, nausea, upset stomach, liver function test abnormalities, taste disturbance, and stomach pain.  There is a rare possibility of liver failure that can occur when taking fluconazole.  The patient understands that monitoring of LFTs and kidney function test may be required, especially at baseline. The patient verbalized understanding of the proper use and possible adverse effects of fluconazole.  All of the patient's questions and concerns were addressed.
Metronidazole Pregnancy And Lactation Text: This medication is Pregnancy Category B and considered safe during pregnancy.  It is also excreted in breast milk.
Glycopyrrolate Counseling:  I discussed with the patient the risks of glycopyrrolate including but not limited to skin rash, drowsiness, dry mouth, difficulty urinating, and blurred vision.
Minocycline Counseling: Patient advised regarding possible photosensitivity and discoloration of the teeth, skin, lips, tongue and gums.  Patient instructed to avoid sunlight, if possible.  When exposed to sunlight, patients should wear protective clothing, sunglasses, and sunscreen.  The patient was instructed to call the office immediately if the following severe adverse effects occur:  hearing changes, easy bruising/bleeding, severe headache, or vision changes.  The patient verbalized understanding of the proper use and possible adverse effects of minocycline.  All of the patient's questions and concerns were addressed.
Elidel Counseling: Patient may experience a mild burning sensation during topical application. Elidel is not approved in children less than 2 years of age. There have been case reports of hematologic and skin malignancies in patients using topical calcineurin inhibitors although causality is questionable.
Albendazole Counseling:  I discussed with the patient the risks of albendazole including but not limited to cytopenia, kidney damage, nausea/vomiting and severe allergy.  The patient understands that this medication is being used in an off-label manner.
Thalidomide Counseling: I discussed with the patient the risks of thalidomide including but not limited to birth defects, anxiety, weakness, chest pain, dizziness, cough and severe allergy.
Tazorac Counseling:  Patient advised that medication is irritating and drying.  Patient may need to apply sparingly and wash off after an hour before eventually leaving it on overnight.  The patient verbalized understanding of the proper use and possible adverse effects of tazorac.  All of the patient's questions and concerns were addressed.
Glycopyrrolate Pregnancy And Lactation Text: This medication is Pregnancy Category B and is considered safe during pregnancy. It is unknown if it is excreted breast milk.
Ilumya Counseling: I discussed with the patient the risks of tildrakizumab including but not limited to immunosuppression, malignancy, posterior leukoencephalopathy syndrome, and serious infections.  The patient understands that monitoring is required including a PPD at baseline and must alert us or the primary physician if symptoms of infection or other concerning signs are noted.
Xeljanz Counseling: I discussed with the patient the risks of Xeljanz therapy including increased risk of infection, liver issues, headache, diarrhea, or cold symptoms. Live vaccines should be avoided. They were instructed to call if they have any problems.
Xelnickyz Pregnancy And Lactation Text: This medication is Pregnancy Category D and is not considered safe during pregnancy.  The risk during breast feeding is also uncertain.
Acitretin Counseling:  I discussed with the patient the risks of acitretin including but not limited to hair loss, dry lips/skin/eyes, liver damage, hyperlipidemia, depression/suicidal ideation, photosensitivity.  Serious rare side effects can include but are not limited to pancreatitis, pseudotumor cerebri, bony changes, clot formation/stroke/heart attack.  Patient understands that alcohol is contraindicated since it can result in liver toxicity and significantly prolong the elimination of the drug by many years.
Griseofulvin Counseling:  I discussed with the patient the risks of griseofulvin including but not limited to photosensitivity, cytopenia, liver damage, nausea/vomiting and severe allergy.  The patient understands that this medication is best absorbed when taken with a fatty meal (e.g., ice cream or french fries).
Hydroxychloroquine Counseling:  I discussed with the patient that a baseline ophthalmologic exam is needed at the start of therapy and every year thereafter while on therapy. A CBC may also be warranted for monitoring.  The side effects of this medication were discussed with the patient, including but not limited to agranulocytosis, aplastic anemia, seizures, rashes, retinopathy, and liver toxicity. Patient instructed to call the office should any adverse effect occur.  The patient verbalized understanding of the proper use and possible adverse effects of Plaquenil.  All the patient's questions and concerns were addressed.
Acitretin Pregnancy And Lactation Text: This medication is Pregnancy Category X and should not be given to women who are pregnant or may become pregnant in the future. This medication is excreted in breast milk.
Valtrex Counseling: I discussed with the patient the risks of valacyclovir including but not limited to kidney damage, nausea, vomiting and severe allergy.  The patient understands that if the infection seems to be worsening or is not improving, they are to call.
Ivermectin Counseling:  Patient instructed to take medication on an empty stomach with a full glass of water.  Patient informed of potential adverse effects including but not limited to nausea, diarrhea, dizziness, itching, and swelling of the extremities or lymph nodes.  The patient verbalized understanding of the proper use and possible adverse effects of ivermectin.  All of the patient's questions and concerns were addressed.
Tazorac Pregnancy And Lactation Text: This medication is not safe during pregnancy. It is unknown if this medication is excreted in breast milk.
Topical Clindamycin Counseling: Patient counseled that this medication may cause skin irritation or allergic reactions.  In the event of skin irritation, the patient was advised to reduce the amount of the drug applied or use it less frequently.   The patient verbalized understanding of the proper use and possible adverse effects of clindamycin.  All of the patient's questions and concerns were addressed.
Eucrisa Counseling: Patient may experience a mild burning sensation during topical application. Eucrisa is not approved in children less than 2 years of age.
Quinolones Counseling:  I discussed with the patient the risks of fluoroquinolones including but not limited to GI upset, allergic reaction, drug rash, diarrhea, dizziness, photosensitivity, yeast infections, liver function test abnormalities, tendonitis/tendon rupture.
Infliximab Counseling:  I discussed with the patient the risks of infliximab including but not limited to myelosuppression, immunosuppression, autoimmune hepatitis, demyelinating diseases, lymphoma, and serious infections.  The patient understands that monitoring is required including a PPD at baseline and must alert us or the primary physician if symptoms of infection or other concerning signs are noted.
Griseofulvin Pregnancy And Lactation Text: This medication is Pregnancy Category X and is known to cause serious birth defects. It is unknown if this medication is excreted in breast milk but breast feeding should be avoided.
Finasteride Pregnancy And Lactation Text: This medication is absolutely contraindicated during pregnancy. It is unknown if it is excreted in breast milk.
Rhofade Pregnancy And Lactation Text: This medication has not been assigned a Pregnancy Risk Category. It is unknown if the medication is excreted in breast milk.
Dutasteride Pregnancy And Lactation Text: This medication is absolutely contraindicated in women, especially during pregnancy and breast feeding. Feminization of male fetuses is possible if taking while pregnant.
Opioid Pregnancy And Lactation Text: These medications can lead to premature delivery and should be avoided during pregnancy. These medications are also present in breast milk in small amounts.
Calcipotriene Pregnancy And Lactation Text: This medication has not been proven safe during pregnancy. It is unknown if this medication is excreted in breast milk.
Tranexamic Acid Counseling:  Patient advised of the small risk of bleeding problems with tranexamic acid. They were also instructed to call if they developed any nausea, vomiting or diarrhea. All of the patient's questions and concerns were addressed.
Propranolol Counseling:  I discussed with the patient the risks of propranolol including but not limited to low heart rate, low blood pressure, low blood sugar, restlessness and increased cold sensitivity. They should call the office if they experience any of these side effects.
Calcipotriene Counseling:  I discussed with the patient the risks of calcipotriene including but not limited to erythema, scaling, itching, and irritation.
Winlevi Pregnancy And Lactation Text: This medication is considered safe during pregnancy and breastfeeding.
Azelaic Acid Counseling: Patient counseled that medicine may cause skin irritation and to avoid applying near the eyes.  In the event of skin irritation, the patient was advised to reduce the amount of the drug applied or use it less frequently.   The patient verbalized understanding of the proper use and possible adverse effects of azelaic acid.  All of the patient's questions and concerns were addressed.
Libtayo Pregnancy And Lactation Text: This medication is contraindicated in pregnancy and when breast feeding.
Propranolol Pregnancy And Lactation Text: This medication is Pregnancy Category C and it isn't known if it is safe during pregnancy. It is excreted in breast milk.
Opioid Counseling: I discussed with the patient the potential side effects of opioids including but not limited to addiction, altered mental status, and depression. I stressed avoiding alcohol, benzodiazepines, muscle relaxants and sleep aids unless specifically okayed by a physician. The patient verbalized understanding of the proper use and possible adverse effects of opioids. All of the patient's questions and concerns were addressed. They were instructed to flush the remaining pills down the toilet if they did not need them for pain.
Tranexamic Acid Pregnancy And Lactation Text: It is unknown if this medication is safe during pregnancy or breast feeding.
Topical Ketoconazole Counseling: Patient counseled that this medication may cause skin irritation or allergic reactions.  In the event of skin irritation, the patient was advised to reduce the amount of the drug applied or use it less frequently.   The patient verbalized understanding of the proper use and possible adverse effects of ketoconazole.  All of the patient's questions and concerns were addressed.
Wartpeel Counseling:  I discussed with the patient the risks of Wartpeel including but not limited to erythema, scaling, itching, weeping, crusting, and pain.
Oral Minoxidil Pregnancy And Lactation Text: This medication should only be used when clearly needed if you are pregnant, attempting to become pregnant or breast feeding.
Niacinamide Pregnancy And Lactation Text: These medications are considered safe during pregnancy.
Dutasteride Male Counseling: Dustasteride Counseling:  I discussed with the patient the risks of use of dutasteride including but not limited to decreased libido, decreased ejaculate volume, and gynecomastia. Women who can become pregnant should not handle medication.  All of the patient's questions and concerns were addressed.
Mirvaso Counseling: Mirvaso is a topical medication which can decrease superficial blood flow where applied. Side effects are uncommon and include stinging, redness and allergic reactions.
Rhofade Counseling: Rhofade is a topical medication which can decrease superficial blood flow where applied. Side effects are uncommon and include stinging, redness and allergic reactions.
Oral Minoxidil Counseling- I discussed with the patient the risks of oral minoxidil including but not limited to shortness of breath, swelling of the feet or ankles, dizziness, lightheadedness, unwanted hair growth and allergic reaction.  The patient verbalized understanding of the proper use and possible adverse effects of oral minoxidil.  All of the patient's questions and concerns were addressed.
Winlevi Counseling:  I discussed with the patient the risks of topical clascoterone including but not limited to erythema, scaling, itching, and stinging. Patient voiced their understanding.
Niacinamide Counseling: I recommended taking niacin or niacinamide, also know as vitamin B3, twice daily. Recent evidence suggests that taking vitamin B3 (500 mg twice daily) can reduce the risk of actinic keratoses and non-melanoma skin cancers. Side effects of vitamin B3 include flushing and headache.
Finasteride Male Counseling: Finasteride Counseling:  I discussed with the patient the risks of use of finasteride including but not limited to decreased libido, decreased ejaculate volume, gynecomastia, and depression. Women should not handle medication.  All of the patient's questions and concerns were addressed.
Libtayo Counseling- I discussed with the patient the risks of Libtayo including but not limited to nausea, vomiting, diarrhea, and bone or muscle pain.  The patient verbalized understanding of the proper use and possible adverse effects of Libtayo.  All of the patient's questions and concerns were addressed.
Rinvoq Counseling: I discussed with the patient the risks of Rinvoq therapy including but not limited to upper respiratory tract infections, shingles, cold sores, bronchitis, nausea, cough, fever, acne, and headache. Live vaccines should be avoided.  This medication has been linked to serious infections; higher rate of mortality; malignancy and lymphoproliferative disorders; major adverse cardiovascular events; thrombosis; thrombocytopenia, anemia, and neutropenia; lipid elevations; liver enzyme elevations; and gastrointestinal perforations.
Rinvoq Pregnancy And Lactation Text: Based on animal studies, Rinvoq may cause embryo-fetal harm when administered to pregnant women.  The medication should not be used in pregnancy.  Breastfeeding is not recommended during treatment and for 6 days after the last dose.
Sotyktu Counseling:  I discussed the most common side effects of Sotyktu including: common cold, sore throat, sinus infections, cold sores, canker sores, folliculitis, and acne.? I also discussed more serious side effects of Sotyktu including but not limited to: serious allergic reactions; increased risk for infections such as TB; cancers such as lymphomas; rhabdomyolysis and elevated CPK; and elevated triglycerides and liver enzymes.?
Sotyktu Pregnancy And Lactation Text: There is insufficient data to evaluate whether or not Sotyktu is safe to use during pregnancy.? ?It is not known if Sotyktu passes into breast milk and whether or not it is safe to use when breastfeeding.??
Olanzapine Counseling- I discussed with the patient the common side effects of olanzapine including but are not limited to: lack of energy, dry mouth, increased appetite, sleepiness, tremor, constipation, dizziness, changes in behavior, or restlessness.  Explained that teenagers are more likely to experience headaches, abdominal pain, pain in the arms or legs, tiredness, and sleepiness.  Serious side effects include but are not limited: increased risk of death in elderly patients who are confused, have memory loss, or dementia-related psychosis; hyperglycemia; increased cholesterol and triglycerides; and weight gain.
Olanzapine Pregnancy And Lactation Text: This medication is pregnancy category C.   There are no adequate and well controlled trials with olanzapine in pregnant females.  Olanzapine should be used during pregnancy only if the potential benefit justifies the potential risk to the fetus.   In a study in lactating healthy women, olanzapine was excreted in breast milk.  It is recommended that women taking olanzapine should not breast feed.
Cibinqo Counseling: I discussed with the patient the risks of Cibinqo therapy including but not limited to common cold, nausea, headache, cold sores, increased blood CPK levels, dizziness, UTIs, fatigue, acne, and vomitting. Live vaccines should be avoided.  This medication has been linked to serious infections; higher rate of mortality; malignancy and lymphoproliferative disorders; major adverse cardiovascular events; thrombosis; thrombocytopenia and lymphopenia; lipid elevations; and retinal detachment.
Opzelura Counseling:  I discussed with the patient the risks of Opzelura including but not limited to nasopharngitis, bronchitis, ear infection, eosinophila, hives, diarrhea, folliculitis, tonsillitis, and rhinorrhea.  Taken orally, this medication has been linked to serious infections; higher rate of mortality; malignancy and lymphoproliferative disorders; major adverse cardiovascular events; thrombosis; thrombocytopenia, anemia, and neutropenia; and lipid elevations.
Adbry Counseling: I discussed with the patient the risks of tralokinumab including but not limited to eye infection and irritation, cold sores, injection site reactions, worsening of asthma, allergic reactions and increased risk of parasitic infection.  Live vaccines should be avoided while taking tralokinumab. The patient understands that monitoring is required and they must alert us or the primary physician if symptoms of infection or other concerning signs are noted.
Cibinqo Pregnancy And Lactation Text: It is unknown if this medication will adversely affect pregnancy or breast feeding.  You should not take this medication if you are currently pregnant or planning a pregnancy or while breastfeeding.
Opzelura Pregnancy And Lactation Text: There is insufficient data to evaluate drug-associated risk for major birth defects, miscarriage, or other adverse maternal or fetal outcomes.  There is a pregnancy registry that monitors pregnancy outcomes in pregnant persons exposed to the medication during pregnancy.  It is unknown if this medication is excreted in breast milk.  Do not breastfeed during treatment and for about 4 weeks after the last dose.
Adbry Pregnancy And Lactation Text: It is unknown if this medication will adversely affect pregnancy or breast feeding.
Low Dose Naltrexone Counseling- I discussed with the patient the potential risks and side effects of low dose naltrexone including but not limited to: more vivid dreams, headaches, nausea, vomiting, abdominal pain, fatigue, dizziness, and anxiety.
Olumiant Counseling: I discussed with the patient the risks of Olumiant therapy including but not limited to upper respiratory tract infections, shingles, cold sores, and nausea. Live vaccines should be avoided.  This medication has been linked to serious infections; higher rate of mortality; malignancy and lymphoproliferative disorders; major adverse cardiovascular events; thrombosis; gastrointestinal perforations; neutropenia; lymphopenia; anemia; liver enzyme elevations; and lipid elevations.
Olumiant Pregnancy And Lactation Text: Based on animal studies, Olumiant may cause embryo-fetal harm when administered to pregnant women.  The medication should not be used in pregnancy.  Breastfeeding is not recommended during treatment.
Low Dose Naltrexone Pregnancy And Lactation Text: Naltrexone is pregnancy category C.  There have been no adequate and well-controlled studies in pregnant women.  It should be used in pregnancy only if the potential benefit justifies the potential risk to the fetus.   Limited data indicates that naltrexone is minimally excreted into breastmilk.
Bimzelx Counseling:  I discussed with the patient the risks of Bimzelx including but not limited to depression, immunosuppression, allergic reactions and infections.  The patient understands that monitoring is required including a PPD at baseline and must alert us or the primary physician if symptoms of infection or other concerning signs are noted.
Spevigo Pregnancy And Lactation Text: The risk during pregnancy and breastfeeding is uncertain with this medication. This medication does cross the placenta. It is unknown if this medication is found in breast milk.
Bimzelx Pregnancy And Lactation Text: This medication crosses the placenta and the safety is uncertain during pregnancy. It is unknown if this medication is present in breast milk.
Hyrimoz Counseling:  I discussed with the patient the risks of adalimumab including but not limited to myelosuppression, immunosuppression, autoimmune hepatitis, demyelinating diseases, lymphoma, and serious infections.  The patient understands that monitoring is required including a PPD at baseline and must alert us or the primary physician if symptoms of infection or other concerning signs are noted.
Simlandi Counseling:  I discussed with the patient the risks of adalimumab including but not limited to myelosuppression, immunosuppression, autoimmune hepatitis, demyelinating diseases, lymphoma, and serious infections.  The patient understands that monitoring is required including a PPD at baseline and must alert us or the primary physician if symptoms of infection or other concerning signs are noted.
Litfulo Counseling: Litfulo (Ritlecitinib) Counseling: I discussed with the patient the risks of Litfulo therapy including but not limited to headache, upper respiratory infections, nausea, diarrhea, acne, and urticaria. Live vaccines should be avoided.  This medication has been linked to serious infections; higher rate of mortality; malignancy and lymphoproliferative disorders; major adverse cardiovascular events; thrombosis; decreases in lymphocytes and platelets; liver enzyme elevations; and CPK elevations.
Litfulo Pregnancy And Lactation Text: There is insufficient data to evaluate whether or not Litfulo is safe to use during pregnancy.  Breastfeeding is not recommended during treatment.
Dutasteride Female Counseling: Dutasteride Counseling:  I discussed with the patient the risks of use of dutasteride including but not limited to decreased libido and sexual dysfunction. Explained the teratogenic nature of the medication and stressed the importance of not getting pregnant during treatment. All of the patient's questions and concerns were addressed.
Ebglyss Counseling: I discussed with the patient the risks of lebrikizumab including but not limited to eye inflammation and irritation, cold sores, injection site reactions, allergic reactions and increased risk of parasitic infection. The patient understands that monitoring is required and they must alert us or the primary physician if symptoms of infection or other concerning signs are noted.
Ebglyss Pregnancy And Lactation Text: This medication likely crosses the placenta but the risk for the fetus is uncertain. It is unknown if this medication is excreted in breast milk.
Nemluvio Counseling: I discussed with the patient the risks of nemolizumab including but not limited to headache, gastrointestinal complaints, nasopharyngitis, musculoskeletal complaints, injection site reactions, and allergic reactions. The patient understands that monitoring is required and they must alert us or the primary physician if any side effects are noted.
Nemluvio Pregnancy And Lactation Text: It is not known if Nemluvio causes fetal harm or is present in breast milk. Please proceed with caution if patients who are pregnant or breastfeeding.
Spevigo Counseling: I discussed with the patient the risks of Spevigo including but not limited to fatigue, nasuea, vomiting, headache, pruritus, urinary tract infection, an infusion related reactions.  The patient understands that monitoring is required including screening for tuberculosis at baseline and yearly screening thereafter while continuing Spevigo therapy. They should contact us if symptoms of infection or other concerning signs are noted.
Finasteride Female Counseling: Finasteride Counseling:  I discussed with the patient the risks of use of finasteride including but not limited to decreased libido and sexual dysfunction. Explained the teratogenic nature of the medication and stressed the importance of not getting pregnant during treatment. All of the patient's questions and concerns were addressed.

## 2025-03-11 NOTE — PROCEDURE: CHRONOLOGY OF PRESENT ILLNESS
Chronology Of Present Illness: 8/8/2019:  Rash, located on the right ear and scalp. The rash is scaly, red, and itchy. The rash has been present for years. She has arthritis. She is not currently on any medications. She was started on Olux foam and ketoconazole cream.\\n11/17/2019: scalp is improved, ears are not. 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 She was started on hydrocortisone butuyrate and ketoconazole for the underarms. Biopsy obtained (eczematous dermatitis)\\n1/3/2020: Axillae have improved. scalp and ears as well. Continue current topicals and Pramosone lotion was added for the underarms.\\n2/5/2020: drastic improvement of axillae. She discontinued Hydrocortisone cream and is only using ketoconazole cream. scalp and ears are stable. Start Elidel cream to ears. \\n3/25/2020: scalp and ears not improved. Discussed Dupixent with patient. Consider after Cn19. Continue current topicals.\\n5/27/2020: scalp still flaring. Punch bx obtained of neck. (folliculitis, but not c/w clinical symptoms and presentation. Favor eczematous derm/AD. Continue current topicals. \\n8/4/2020: rash not well controlled on topicals. Start Dupixent enrollment. BSA 4, SEVERE. IGA score 4. Full biologic labs obtained. RX Valtrex given.\\n9/1/2020: DUPIXENT START TODAY. Recommend Systane eyedrops and Valtrex PRN as needed. \\n10/6/2020: Patient is somewhat improved, overall itching is better. Her ears are still very itchy. She is using Olux foam and ketoconazole cream to ears. She complains of new onset of joint pain above elbows and shoulders. She will see Dr. Dai and we will send letter.\\n11/3/2020: patient is improved overall. She still complains of itchy ears. Recommend talking to her audiologist to see if there are other hearing aids that are made of different material. RX Mimyx given for barrier cream. Continue Protopic 0.1% ointment QD PRN to ears and other topicals. Continue Dupixent injections. IGA score 1\\n12/9/2020: patient is much better. Ears are better. Hearing aid manufacturers told her that hearing aids are made out of medical grade hypoallergenic silicone. Itching has improved. Continue current topicals, antihistamines and Dupixent. Start Zonalon cream to ears for itching. Lab order given to patient today. Discussed that injection site reactions should subside.\\n3/17/21: eyes are very bothersome. Discussed possible component of rosacea. Rec warm rice sock compresses in addition to the Systane eye drops. For now continue Dupixent and topicals.\\n4/19/21: Eyes are still dry and bothersome. She states she will be seeing a dry eye specialist on Wednesday. She reports continued itching in the ears. Recommend not making too many changes to treatment regimen due to patient?s upcoming appt with dry eye specialist. Pramosone cream prescribed today for itching in ears, briefly discussed R/B/SE of gabapentin to consider starting at NOV if itching does not improve.\\n7/19/21: stable. Continue Dupixent Q 2 weeks and topicals. Recently diagnosed with gastroenteritis and possible sepsis. 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 Continue Dupixent and all topicals as needed.\\n1/19/22: patient is stable. Continue Dupixent and topicals PRN.\\n4/20/22: her itching (due to oak pollen) starts 3-4 days prior to next injection. Discussed Rinvoq with patient. Discussed it controls the itching better. Discussed risks, benefits and side effects with patient (increase in malignancies, cardio vascular events, blood clots, DVT - usually associated with prior underlying smoking history or other diseases). Discussed injecting Dupixent Q10 days vs Q14 days. Patient desires to continue Dupixent at shorter intervals. Consider adding on Tim inhibitor during environmental allergy season in the future.\\n5/18/22: Patient reports itching is minimally improved on modified Dupixent dosing (every 9-10 days). She reports worst areas are on the arms and inside ears. Re-discussed R/B/SE of Rinvoq in detail today. Patient reports worst 7/10 itch. Patient reports no personal hx of cancer or blood clots. She reports hx of migraines. Patient reports worsened eye dryness since starting Dupixent therapy. She is agreeable to start Rinvoq, insurance coverage check initiated today. Patient to obtain repeat full biologics labs, lab slip provided to patient to get drawn ASAP (MUST be fasting). She is due for her next Dupixent injection this Sunday. She has not been vaccinated for shingles, recommend getting shingles vaccine first ASAP, then inject Dupixent as scheduled on Sunday. F/u in 2 weeks. Sample saved for pt, plan to start therapy at NOV.\\n5/31/22: Patient received shingles vaccine right after LOV. Last Dupixent injection 5/22/22. Patient is agreeable to start Rinvoq today. Rinvoq PA denied, will submit appeal with SCORAD score (39). Symptoms are worsening, causing intense itchiness and sleeplessness. RINVOQ START TODAY. samples provided in office.\\n7/5/22: Patient reports itching was much better when taking Rinvoq. Decreased itch factor from 6-7, down to a 2-3. Patient reports light headedness, nausea, indigestion, twitching in her chest while taking Rinvoq. RINVOQ D/C. She stopped it a couple days ago due to symptoms. She is still recovering from the side effects. Recommend starting Opzelura at this point. Consider restarting Rinvoq every other day once symptoms have resolved. Discussed Vtama with patient as well if Opzelura is not improving her symptoms.\\n8/5/22: Pt reports itching has not improved since starting opzelura. Pt to RESTART DUPIXENT TODAY with loading dose after todays OV, sample given to Pt (1 300mg SC injection, pt states she has 1 pen at home). Rx sent to [facility].\\n9/2/22: pt states doing a little better after restarting Dupixent. Minor dry eyes and using Vytone and Olux foam topically currently. Will give pt Vtama sample today. Discussed considering restarting Rinvoq in future at lower dose. Patient states it worked the best but she complained of N/V and mild palpitations.\\n11/10/22: unimproved. First week after injection is good, then she itches again. Using a variety of topicals. Recommend trying Dermeleve cream and Remedy or glove in a bottle. Samples given. She will try injecting every week (supported by samples) x 1 month.\\n1/4/23: worse again. 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 Discussed restarting  Rinvoq at lower dosing vs prednisone taper. She reports insomnia with it. Patient agreeable to prednisone taper. Discussed MTX with patient. Will consider at follow up. Ears very itchy.\\n1/24/23: pt did not tolerate prednisone well, says eczema is unchanged. Itching on ears and shoulders today. Discussed MTX today and test dosage. Discussed regulatory blood work every month while on MTX. Pt would like to start MTX, will send Rx today. Pt to start 1/26. Will discontinue dupixent for now. MTX START TODAY.\\n2/22/23: pt fatigued and foggy on MTX. worse on dose day, better throughout the week. Ears look normal on exam today. Consider inc dosage at f/u if not further improved\\n3/21/23: pt reports less foggy and fatigue on current regimen. Continue same dose of MTX for another month, plan to increase NOV pending labs WNL.\\n4/4/23: new rash armpits. C/w ACD/ICD/eczema. Treat with TAC 0.1%. AD overall getting better. Less itching. Continue MTX per current regimen. Today not evaluated. Work in for rash.\\n04/21/23: pt reports rash under armpits has resolved. States she noticed a new rash yesterday also under LT underarm. As of today we will be increasing MTX dosage to 17.5/ week. Pt to take 3 QAM and 4 QPM only on fridays. Pt reports improvement in itching but still remains moderate to severe (5 to 6). Labs reviewed in OV, lab slip provided to pt for next OV.\\n5/23/23: unimproved. Recommend Olux foam to scalp. Recommend increase MTX to 4 pills BID. Patient agreeable.\\n6/20/23: pt stable. She reports side effects including fogginess and headaches with increased MTX dosage. Recommended continuing on current dosage and observe for resolution of side effects. Discussed patch testing in the fall.\\n07/19/23: pt reports stable. Still feeling nauseous and foggy but tolerable. Discussed continuing current regimen, pt agreeable.\\n8/22/23: Pt reports stable most days with itching mostly under control. Discussed continuing current regimen and Pt agreeable.\\n10/24/23: inadequately controlled. Discussed other tx options. Consider Gabapentin. Patient agreeable to START GABAPENTIN TODAY. Rx given. Continue methotrexate sodium 2.5 mg tablet: Take 4 tablets PO BID every Friday. Re check labs in 2 months.\\n11/27/23: pt reports a flare under the armpits and ear but is using Ketoconazole cream and triamcinalone which is knocking down the flare. Pt reports only being able to tolerate Gabapentin 300mg 1 PO QD before bedtime. Pt has increased fogginess with the gabapentin. Discussed starting Cibinqo in the future but will try current regimen for 1 more month to see if itching improves even more. Plan to continue topicals, gabapentin and methotrexate.\\n12/11/23: pt comes in today with nail thickening and pain, discussed with pt that the nail is most likely due to trauma. Also discussed that because pt is on methotrexate it makes her immunosuppressed which could lead to infections so advised Pt to keep an eye on the toe and watch out for any erythema or irritation. Continue current regimen and f/u as scheduled.\\n1/3/23: pt states skin is doing well. Since LOV Pt did have a flare under the armpits but was well controlled with TAC and Ketoconazole. Pt doing well on current regimen and has been able to increase gabapentin to 300mg PO BID. Pt still having toe pain so will begin treatment. Discussed going every other month on the bloodwork, Pt agreeable. F/u with pt in a month.\\n2/6/24: pt states skin is doing much better for this time of year than in past years. Pt states that she has had a few flares but is well controlled with topicals. Plan to continue on current regimen and f/u in 2 months.\\n4/9/24: pt states she has had quite a few flares since LOV, thinks it’s due to pollen. Discussed staying on current dosage of methotrexate vs increasing dosage, pt would like to continue on current dosage. Using olux (ears), TAC and opzelura as needed. Pt still having issues with nail, vikor testing done today. Plan to continue current regimen and f/u in 2 months (give pt new standing order at NOV, will start drawing blood Q3 months.\\n9/10/24: pt states flaring on tops of both hands with itching, as well as under the arms. Pt to continue using current topicals and pills. Nails doing well with improved growth. Pt states ears doing well, discussed increasing methotrexate dosage, pt would like to continue on current dosage due to brain fog on current dose.\\n12/10/2024: The patient is stable. The patient has noticed some flares on the hands and bilateral axilla (“due to Cedar Season”). The patient has noticed some increased itching. The patient is well controlled on her current regimen with mild side effects (drowsiness and brain fog for days after taking Methotrexate). At this time, the patient states that the nails are doing well with nothing of significance to report. The patient complains of joint pain (elbows) in the evening. The patient was referred to Dr. Chanel for further evaluation and management.\\n03/11/2025: The patient is stable. The patient has again noticed minor flaring and an increase in itching in the left ear (“due to Cedar Season”). The patient is well controlled on her current regimen with little to no side effects. The patient will follow up in three months at which time she will have had updated labs performed.
Detail Level: Zone

## 2025-03-11 NOTE — PROCEDURE: PRESCRIPTION MEDICATION MANAGEMENT
Render In Strict Bullet Format?: No
Detail Level: Zone
Continue Regimen: .\\nGabapentin 300 mg capsule: Take one capsule PO 1 h before bedtime x 1 day, then take one capsule PO BID x 1 day, then take one capsule PO TID for maintenance (Patient is able to tolerate 3 PO QD)\\nmethotrexate sodium 2.5 mg tablet: Take 4 tablets PO BID every Friday\\nFolic acid 1 mg tablet: Take 1 PO QD\\nOlux 0.05% foam BID PRN flares (ears)\\nKetoconazole 2% shampoo [id number]\\nKetoconazole 2% cream BID to ears\\nOpzelura - apply bid to AA prn
Plan: Advised pt to file down the raised areas of the nail.
Continue Regimen: Jublia 10 % topical solution with applicator : QD AAA left great toenail. Clean once a week with rubbing alcohol.

## 2025-03-11 NOTE — PROCEDURE: METHOTREXATE MONITORING
Pt in room 7 for evaluation of left hip pain from fall.  Pt is awake and alert, resp even and unlabored. Abd soft and non-tender. Pain and tenderness noted to left hip. Pt denies loc. Pt denies chest pain or sob.   
Add Additional Dosage: No
Dosage 4 (Mg/Week): 0
Dosage 2 (Mg/Week): 17.5
Dosage 3 (Mg/Week): 20
Detail Level: Zone
Dosage 1 (Mg/Week): 15
Date Dosage 3 Started: 05/23/2023
Current Dosage: 17.5mg/week
Date Dosage 1 Started: 01/24/2023
Date Dosage 2 Started: 04/21/2023
Calculate Cumulative Dosage Automatically?: Yes
Comments: start 1/24/2023
Most Recent Cbc (Optional): 01/27/2025

## 2025-03-26 ENCOUNTER — RX ONLY (RX ONLY)
Age: 60
End: 2025-03-26

## 2025-03-26 RX ORDER — METHOTREXATE SODIUM 2.5 MG/1
TABLET ORAL
Qty: 32 | Refills: 1 | Status: ERX

## 2025-06-12 ENCOUNTER — APPOINTMENT (OUTPATIENT)
Dept: URBAN - METROPOLITAN AREA CLINIC 125 | Facility: CLINIC | Age: 60
Setting detail: DERMATOLOGY
End: 2025-06-12

## 2025-06-12 ENCOUNTER — RX ONLY (RX ONLY)
Age: 60
End: 2025-06-12

## 2025-06-12 DIAGNOSIS — L20.89 OTHER ATOPIC DERMATITIS: ICD-10-CM | Status: WELL CONTROLLED

## 2025-06-12 DIAGNOSIS — Z79.899 OTHER LONG TERM (CURRENT) DRUG THERAPY: ICD-10-CM

## 2025-06-12 DIAGNOSIS — L60.3 NAIL DYSTROPHY: ICD-10-CM | Status: IMPROVED

## 2025-06-12 PROBLEM — L60.9 NAIL DISORDER, UNSPECIFIED: Status: ACTIVE | Noted: 2025-06-12

## 2025-06-12 PROCEDURE — ? COUNSELING

## 2025-06-12 PROCEDURE — ? PRESCRIPTION MEDICATION MANAGEMENT

## 2025-06-12 PROCEDURE — ? HIGH RISK MEDICATION MONITORING

## 2025-06-12 PROCEDURE — ? CHRONOLOGY OF PRESENT ILLNESS

## 2025-06-12 PROCEDURE — ? VISIT COMPLEXITY

## 2025-06-12 PROCEDURE — ? METHOTREXATE MONITORING

## 2025-06-12 RX ORDER — METHOTREXATE SODIUM 2.5 MG/1
TABLET ORAL
Qty: 32 | Refills: 1 | Status: ERX

## 2025-06-12 ASSESSMENT — LOCATION DETAILED DESCRIPTION DERM
LOCATION DETAILED: RIGHT CYMBA CONCHA
LOCATION DETAILED: LEFT AXILLARY VAULT
LOCATION DETAILED: LEFT VENTRAL PROXIMAL FOREARM
LOCATION DETAILED: LEFT MEDIAL FRONTAL SCALP
LOCATION DETAILED: RIGHT ULNAR DORSAL HAND
LOCATION DETAILED: LEFT RADIAL DORSAL HAND
LOCATION DETAILED: RIGHT VENTRAL PROXIMAL FOREARM
LOCATION DETAILED: LEFT LATERAL GREAT TOE
LOCATION DETAILED: LEFT INFERIOR CRUS OF ANTIHELIX

## 2025-06-12 ASSESSMENT — LOCATION SIMPLE DESCRIPTION DERM
LOCATION SIMPLE: LEFT EAR
LOCATION SIMPLE: LEFT FOREARM
LOCATION SIMPLE: RIGHT EAR
LOCATION SIMPLE: LEFT GREAT TOE
LOCATION SIMPLE: RIGHT HAND
LOCATION SIMPLE: LEFT HAND
LOCATION SIMPLE: RIGHT FOREARM
LOCATION SIMPLE: LEFT AXILLARY VAULT
LOCATION SIMPLE: LEFT SCALP

## 2025-06-12 ASSESSMENT — LOCATION ZONE DERM
LOCATION ZONE: SCALP
LOCATION ZONE: HAND
LOCATION ZONE: TOE
LOCATION ZONE: AXILLAE
LOCATION ZONE: ARM
LOCATION ZONE: EAR

## 2025-06-12 NOTE — PROCEDURE: CHRONOLOGY OF PRESENT ILLNESS
Chronology Of Present Illness: 8/8/2019:  Rash, located on the right ear and scalp. The rash is scaly, red, and itchy. The rash has been present for years. She has arthritis. She is not currently on any medications. She was started on Olux foam and ketoconazole cream.\\n11/17/2019: scalp is improved, ears are not. 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 She was started on hydrocortisone butuyrate and ketoconazole for the underarms. Biopsy obtained (eczematous dermatitis)\\n1/3/2020: Axillae have improved. scalp and ears as well. Continue current topicals and Pramosone lotion was added for the underarms.\\n2/5/2020: drastic improvement of axillae. She discontinued Hydrocortisone cream and is only using ketoconazole cream. scalp and ears are stable. Start Elidel cream to ears. \\n3/25/2020: scalp and ears not improved. Discussed Dupixent with patient. Consider after Cn19. Continue current topicals.\\n5/27/2020: scalp still flaring. Punch bx obtained of neck. (folliculitis, but not c/w clinical symptoms and presentation. Favor eczematous derm/AD. Continue current topicals. \\n8/4/2020: rash not well controlled on topicals. Start Dupixent enrollment. BSA 4, SEVERE. IGA score 4. Full biologic labs obtained. RX Valtrex given.\\n9/1/2020: DUPIXENT START TODAY. Recommend Systane eyedrops and Valtrex PRN as needed. \\n10/6/2020: Patient is somewhat improved, overall itching is better. Her ears are still very itchy. She is using Olux foam and ketoconazole cream to ears. She complains of new onset of joint pain above elbows and shoulders. She will see Dr. Dai and we will send letter.\\n11/3/2020: patient is improved overall. She still complains of itchy ears. Recommend talking to her audiologist to see if there are other hearing aids that are made of different material. RX Mimyx given for barrier cream. Continue Protopic 0.1% ointment QD PRN to ears and other topicals. Continue Dupixent injections. IGA score 1\\n12/9/2020: patient is much better. Ears are better. Hearing aid manufacturers told her that hearing aids are made out of medical grade hypoallergenic silicone. Itching has improved. Continue current topicals, antihistamines and Dupixent. Start Zonalon cream to ears for itching. Lab order given to patient today. Discussed that injection site reactions should subside.\\n3/17/21: eyes are very bothersome. Discussed possible component of rosacea. Rec warm rice sock compresses in addition to the Systane eye drops. For now continue Dupixent and topicals.\\n4/19/21: Eyes are still dry and bothersome. She states she will be seeing a dry eye specialist on Wednesday. She reports continued itching in the ears. Recommend not making too many changes to treatment regimen due to patient?s upcoming appt with dry eye specialist. Pramosone cream prescribed today for itching in ears, briefly discussed R/B/SE of gabapentin to consider starting at NOV if itching does not improve.\\n7/19/21: stable. Continue Dupixent Q 2 weeks and topicals. Recently diagnosed with gastroenteritis and possible sepsis. 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 Continue Dupixent and all topicals as needed.\\n1/19/22: patient is stable. Continue Dupixent and topicals PRN.\\n4/20/22: her itching (due to oak pollen) starts 3-4 days prior to next injection. Discussed Rinvoq with patient. Discussed it controls the itching better. Discussed risks, benefits and side effects with patient (increase in malignancies, cardio vascular events, blood clots, DVT - usually associated with prior underlying smoking history or other diseases). Discussed injecting Dupixent Q10 days vs Q14 days. Patient desires to continue Dupixent at shorter intervals. Consider adding on Tim inhibitor during environmental allergy season in the future.\\n5/18/22: Patient reports itching is minimally improved on modified Dupixent dosing (every 9-10 days). She reports worst areas are on the arms and inside ears. Re-discussed R/B/SE of Rinvoq in detail today. Patient reports worst 7/10 itch. Patient reports no personal hx of cancer or blood clots. She reports hx of migraines. Patient reports worsened eye dryness since starting Dupixent therapy. She is agreeable to start Rinvoq, insurance coverage check initiated today. Patient to obtain repeat full biologics labs, lab slip provided to patient to get drawn ASAP (MUST be fasting). She is due for her next Dupixent injection this Sunday. She has not been vaccinated for shingles, recommend getting shingles vaccine first ASAP, then inject Dupixent as scheduled on Sunday. F/u in 2 weeks. Sample saved for pt, plan to start therapy at NOV.\\n5/31/22: Patient received shingles vaccine right after LOV. Last Dupixent injection 5/22/22. Patient is agreeable to start Rinvoq today. Rinvoq PA denied, will submit appeal with SCORAD score (39). Symptoms are worsening, causing intense itchiness and sleeplessness. RINVOQ START TODAY. samples provided in office.\\n7/5/22: Patient reports itching was much better when taking Rinvoq. Decreased itch factor from 6-7, down to a 2-3. Patient reports light headedness, nausea, indigestion, twitching in her chest while taking Rinvoq. RINVOQ D/C. She stopped it a couple days ago due to symptoms. She is still recovering from the side effects. Recommend starting Opzelura at this point. Consider restarting Rinvoq every other day once symptoms have resolved. Discussed Vtama with patient as well if Opzelura is not improving her symptoms.\\n8/5/22: Pt reports itching has not improved since starting opzelura. Pt to RESTART DUPIXENT TODAY with loading dose after todays OV, sample given to Pt (1 300mg SC injection, pt states she has 1 pen at home). Rx sent to [facility].\\n9/2/22: pt states doing a little better after restarting Dupixent. Minor dry eyes and using Vytone and Olux foam topically currently. Will give pt Vtama sample today. Discussed considering restarting Rinvoq in future at lower dose. Patient states it worked the best but she complained of N/V and mild palpitations.\\n11/10/22: unimproved. First week after injection is good, then she itches again. Using a variety of topicals. Recommend trying Dermeleve cream and Remedy or glove in a bottle. Samples given. She will try injecting every week (supported by samples) x 1 month.\\n1/4/23: worse again. 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 Discussed restarting  Rinvoq at lower dosing vs prednisone taper. She reports insomnia with it. Patient agreeable to prednisone taper. Discussed MTX with patient. Will consider at follow up. Ears very itchy.\\n1/24/23: pt did not tolerate prednisone well, says eczema is unchanged. Itching on ears and shoulders today. Discussed MTX today and test dosage. Discussed regulatory blood work every month while on MTX. Pt would like to start MTX, will send Rx today. Pt to start 1/26. Will discontinue dupixent for now. MTX START TODAY.\\n2/22/23: pt fatigued and foggy on MTX. worse on dose day, better throughout the week. Ears look normal on exam today. Consider inc dosage at f/u if not further improved\\n3/21/23: pt reports less foggy and fatigue on current regimen. Continue same dose of MTX for another month, plan to increase NOV pending labs WNL.\\n4/4/23: new rash armpits. C/w ACD/ICD/eczema. Treat with TAC 0.1%. AD overall getting better. Less itching. Continue MTX per current regimen. Today not evaluated. Work in for rash.\\n04/21/23: pt reports rash under armpits has resolved. States she noticed a new rash yesterday also under LT underarm. As of today we will be increasing MTX dosage to 17.5/ week. Pt to take 3 QAM and 4 QPM only on fridays. Pt reports improvement in itching but still remains moderate to severe (5 to 6). Labs reviewed in OV, lab slip provided to pt for next OV.\\n5/23/23: unimproved. Recommend Olux foam to scalp. Recommend increase MTX to 4 pills BID. Patient agreeable.\\n6/20/23: pt stable. She reports side effects including fogginess and headaches with increased MTX dosage. Recommended continuing on current dosage and observe for resolution of side effects. Discussed patch testing in the fall.\\n07/19/23: pt reports stable. Still feeling nauseous and foggy but tolerable. Discussed continuing current regimen, pt agreeable.\\n8/22/23: Pt reports stable most days with itching mostly under control. Discussed continuing current regimen and Pt agreeable.\\n10/24/23: inadequately controlled. Discussed other tx options. Consider Gabapentin. Patient agreeable to START GABAPENTIN TODAY. Rx given. Continue methotrexate sodium 2.5 mg tablet: Take 4 tablets PO BID every Friday. Re check labs in 2 months.\\n11/27/23: pt reports a flare under the armpits and ear but is using Ketoconazole cream and triamcinalone which is knocking down the flare. Pt reports only being able to tolerate Gabapentin 300mg 1 PO QD before bedtime. Pt has increased fogginess with the gabapentin. Discussed starting Cibinqo in the future but will try current regimen for 1 more month to see if itching improves even more. Plan to continue topicals, gabapentin and methotrexate.\\n12/11/23: pt comes in today with nail thickening and pain, discussed with pt that the nail is most likely due to trauma. Also discussed that because pt is on methotrexate it makes her immunosuppressed which could lead to infections so advised Pt to keep an eye on the toe and watch out for any erythema or irritation. Continue current regimen and f/u as scheduled.\\n1/3/23: pt states skin is doing well. Since LOV Pt did have a flare under the armpits but was well controlled with TAC and Ketoconazole. Pt doing well on current regimen and has been able to increase gabapentin to 300mg PO BID. Pt still having toe pain so will begin treatment. Discussed going every other month on the bloodwork, Pt agreeable. F/u with pt in a month.\\n2/6/24: pt states skin is doing much better for this time of year than in past years. Pt states that she has had a few flares but is well controlled with topicals. Plan to continue on current regimen and f/u in 2 months.\\n4/9/24: pt states she has had quite a few flares since LOV, thinks it’s due to pollen. Discussed staying on current dosage of methotrexate vs increasing dosage, pt would like to continue on current dosage. Using olux (ears), TAC and opzelura as needed. Pt still having issues with nail, vikor testing done today. Plan to continue current regimen and f/u in 2 months (give pt new standing order at NOV, will start drawing blood Q3 months.\\n9/10/24: pt states flaring on tops of both hands with itching, as well as under the arms. Pt to continue using current topicals and pills. Nails doing well with improved growth. Pt states ears doing well, discussed increasing methotrexate dosage, pt would like to continue on current dosage due to brain fog on current dose.\\n12/10/2024: The patient is stable. The patient has noticed some flares on the hands and bilateral axilla (“due to Cedar Season”). The patient has noticed some increased itching. The patient is well controlled on her current regimen with mild side effects (drowsiness and brain fog for days after taking Methotrexate). At this time, the patient states that the nails are doing well with nothing of significance to report. The patient complains of joint pain (elbows) in the evening. The patient was referred to Dr. Chanel for further evaluation and management.\\n03/11/2025: The patient is stable. The patient has again noticed minor flaring and an increase in itching in the left ear (“due to Cedar Season”). The patient is well controlled on her current regimen with little to no side effects. The patient will follow up in three months at which time she will have had updated labs performed.\\n06/12/2025: pt is stable. Pt stated that the methotrexate does continue to cause some brain fog, but pt would still like to continue on treatment. refills of methotrexate were sent today in office. Since labs were drawn in 4/2025, discussed drawing new labs at NOV in three months at f/u.
Detail Level: Zone

## 2025-06-12 NOTE — PROCEDURE: METHOTREXATE MONITORING
Add Additional Dosage: No
Dosage 4 (Mg/Week): 0
Dosage 2 (Mg/Week): 17.5
Dosage 3 (Mg/Week): 20
Detail Level: Zone
Dosage 1 (Mg/Week): 15
Date Dosage 3 Started: 05/23/2023
Current Dosage: 17.5mg/week
Date Dosage 1 Started: 01/24/2023
Date Dosage 2 Started: 04/21/2023
Calculate Cumulative Dosage Automatically?: Yes
Comments: start 1/24/2023
Most Recent Cbc (Optional): 04/24/2025

## 2025-06-12 NOTE — PROCEDURE: PRESCRIPTION MEDICATION MANAGEMENT
Render In Strict Bullet Format?: No
Detail Level: Zone
Continue Regimen: .\\nGabapentin 300 mg capsule: Take one capsule PO 1 h before bedtime x 1 day, then take one capsule PO BID x 1 day, then take one capsule PO TID for maintenance (Patient is able to tolerate 3 PO QD)\\nmethotrexate sodium 2.5 mg tablet: Take 4 tablets PO BID every Friday\\nFolic acid 1 mg tablet: Take 1 PO QD\\nOlux 0.05% foam BID PRN flares (ears)\\nKetoconazole 2% shampoo [id number]\\nKetoconazole 2% cream BID to ears\\nOpzelura - apply bid to AA prn
Plan: Advised pt to continue to use the jublia once a week on the new nail.
Modify Regimen: Jublia 10 % topical solution with applicator : use once a week (decreased frequency)

## 2025-07-28 RX ORDER — GABAPENTIN 300 MG/1
CAPSULE ORAL
Qty: 90 | Refills: 1 | Status: ERX

## 2025-08-12 ENCOUNTER — RX ONLY (RX ONLY)
Age: 60
End: 2025-08-12

## 2025-08-12 RX ORDER — METHOTREXATE SODIUM 2.5 MG/1
TABLET ORAL
Qty: 32 | Refills: 2 | Status: ERX